# Patient Record
Sex: MALE | Race: BLACK OR AFRICAN AMERICAN | NOT HISPANIC OR LATINO | Employment: FULL TIME | URBAN - METROPOLITAN AREA
[De-identification: names, ages, dates, MRNs, and addresses within clinical notes are randomized per-mention and may not be internally consistent; named-entity substitution may affect disease eponyms.]

---

## 2019-01-08 ENCOUNTER — HOSPITAL ENCOUNTER (EMERGENCY)
Facility: HOSPITAL | Age: 64
Discharge: HOME | End: 2019-01-08
Attending: EMERGENCY MEDICINE
Payer: COMMERCIAL

## 2019-01-08 VITALS
HEART RATE: 72 BPM | WEIGHT: 198.75 LBS | OXYGEN SATURATION: 99 % | SYSTOLIC BLOOD PRESSURE: 164 MMHG | DIASTOLIC BLOOD PRESSURE: 87 MMHG | HEIGHT: 70 IN | BODY MASS INDEX: 28.45 KG/M2 | TEMPERATURE: 98 F | RESPIRATION RATE: 15 BRPM

## 2019-01-08 DIAGNOSIS — R73.9 HYPERGLYCEMIA: ICD-10-CM

## 2019-01-08 DIAGNOSIS — R07.9 CHEST PAIN: ICD-10-CM

## 2019-01-08 DIAGNOSIS — M25.511 ACUTE PAIN OF RIGHT SHOULDER: Primary | ICD-10-CM

## 2019-01-08 LAB
ALBUMIN SERPL BCP-MCNC: 4 G/DL
ALP SERPL-CCNC: 113 U/L
ALT SERPL W/O P-5'-P-CCNC: 27 U/L
ANION GAP SERPL CALC-SCNC: 12 MMOL/L
AST SERPL-CCNC: 21 U/L
BASOPHILS # BLD AUTO: 0.01 K/UL
BASOPHILS NFR BLD: 0.1 %
BILIRUB SERPL-MCNC: 0.3 MG/DL
BUN SERPL-MCNC: 13 MG/DL
CALCIUM SERPL-MCNC: 9.5 MG/DL
CHLORIDE SERPL-SCNC: 105 MMOL/L
CO2 SERPL-SCNC: 20 MMOL/L
CREAT SERPL-MCNC: 1.1 MG/DL
DIFFERENTIAL METHOD: ABNORMAL
EOSINOPHIL # BLD AUTO: 0.1 K/UL
EOSINOPHIL NFR BLD: 1.3 %
ERYTHROCYTE [DISTWIDTH] IN BLOOD BY AUTOMATED COUNT: 14.2 %
EST. GFR  (AFRICAN AMERICAN): >60 ML/MIN/1.73 M^2
EST. GFR  (NON AFRICAN AMERICAN): >60 ML/MIN/1.73 M^2
GLUCOSE SERPL-MCNC: 283 MG/DL
HCT VFR BLD AUTO: 45.2 %
HGB BLD-MCNC: 14.4 G/DL
LYMPHOCYTES # BLD AUTO: 3.2 K/UL
LYMPHOCYTES NFR BLD: 41.4 %
MCH RBC QN AUTO: 26.4 PG
MCHC RBC AUTO-ENTMCNC: 31.9 G/DL
MCV RBC AUTO: 83 FL
MONOCYTES # BLD AUTO: 0.9 K/UL
MONOCYTES NFR BLD: 10.9 %
NEUTROPHILS # BLD AUTO: 3.6 K/UL
NEUTROPHILS NFR BLD: 46.3 %
PLATELET # BLD AUTO: 268 K/UL
PMV BLD AUTO: 10.5 FL
POTASSIUM SERPL-SCNC: 3.8 MMOL/L
PROT SERPL-MCNC: 7.9 G/DL
RBC # BLD AUTO: 5.46 M/UL
SODIUM SERPL-SCNC: 137 MMOL/L
TROPONIN I SERPL DL<=0.01 NG/ML-MCNC: <0.006 NG/ML
WBC # BLD AUTO: 7.77 K/UL

## 2019-01-08 PROCEDURE — 85025 COMPLETE CBC W/AUTO DIFF WBC: CPT

## 2019-01-08 PROCEDURE — 99285 EMERGENCY DEPT VISIT HI MDM: CPT | Mod: 25

## 2019-01-08 PROCEDURE — 25000003 PHARM REV CODE 250: Performed by: EMERGENCY MEDICINE

## 2019-01-08 PROCEDURE — 84484 ASSAY OF TROPONIN QUANT: CPT

## 2019-01-08 PROCEDURE — 93010 ELECTROCARDIOGRAM REPORT: CPT | Mod: ,,, | Performed by: INTERNAL MEDICINE

## 2019-01-08 PROCEDURE — 93010 EKG 12-LEAD: ICD-10-PCS | Mod: ,,, | Performed by: INTERNAL MEDICINE

## 2019-01-08 PROCEDURE — 93005 ELECTROCARDIOGRAM TRACING: CPT

## 2019-01-08 PROCEDURE — 63600175 PHARM REV CODE 636 W HCPCS: Performed by: EMERGENCY MEDICINE

## 2019-01-08 PROCEDURE — 80053 COMPREHEN METABOLIC PANEL: CPT

## 2019-01-08 RX ORDER — NITROGLYCERIN 0.4 MG/1
0.4 TABLET SUBLINGUAL
Status: COMPLETED | OUTPATIENT
Start: 2019-01-08 | End: 2019-01-08

## 2019-01-08 RX ORDER — MELOXICAM 7.5 MG/1
7.5 TABLET ORAL DAILY
Qty: 10 TABLET | Refills: 0 | Status: SHIPPED | OUTPATIENT
Start: 2019-01-08 | End: 2019-01-18

## 2019-01-08 RX ORDER — ASPIRIN 325 MG
325 TABLET ORAL
Status: COMPLETED | OUTPATIENT
Start: 2019-01-08 | End: 2019-01-08

## 2019-01-08 RX ADMIN — NITROGLYCERIN 0.4 MG: 0.4 TABLET, ORALLY DISINTEGRATING SUBLINGUAL at 05:01

## 2019-01-08 RX ADMIN — ASPIRIN 325 MG ORAL TABLET 325 MG: 325 PILL ORAL at 05:01

## 2019-01-08 NOTE — ED PROVIDER NOTES
SCRIBE #1 NOTE: I, Karin Corbett, am scribing for, and in the presence of, Aime Martinez MD. I have scribed the entire note.      History      Chief Complaint   Patient presents with    Chest Pain     started during the night in right upper arm, constant pain in right side of chest       Review of patient's allergies indicates:  No Known Allergies     HPI   HPI    1/8/2019, 4:58 AM   History obtained from the patient      History of Present Illness: Tramaine Resendiz is a 63 y.o. male patient who presents to the Emergency Department for R sided CP which onset gradually yesterday. Pain radiates to his R shoulder. Pt reports he was doing heavy lifting at work yesterday but became concerned when the pain was still present this AM. Symptoms are constant and moderate in severity. No mitigating or exacerbating factors reported. No associated sxs. Patient denies any fever, chills, diaphoresis, SOB, cough, BLE edema/pain, n/v, extremity weakness/numbness, dizziness, and all other sxs at this time. No further complaints or concerns at this time.       Arrival mode: Personal vehicle     PCP: Primary Doctor No       Past Medical History:  Past medical history reviewed not relevant    Past Surgical History:  Past surgical history reviewed not relevant    Family History:  Family history reviewed not relevant    Social History:    Social History Main Topics    Smoking status: Unknown if ever smoked    Smokeless tobacco: Unknown if ever used    Alcohol Use: Unknown drinking history    Drug Use: Unknown if ever used    Sexual Activity: Unknown       ROS   Review of Systems   Constitutional: Negative for chills, diaphoresis and fever.   HENT: Negative for congestion and sore throat.    Eyes: Negative for visual disturbance.   Respiratory: Negative for cough and shortness of breath.    Cardiovascular: Positive for chest pain (R side). Negative for palpitations and leg swelling.   Gastrointestinal: Negative for nausea and  "vomiting.   Genitourinary: Negative for dysuria.   Musculoskeletal: Positive for arthralgias (R shoulder). Negative for back pain and myalgias.   Skin: Negative for rash.   Neurological: Negative for dizziness, weakness and numbness.   Hematological: Does not bruise/bleed easily.   All other systems reviewed and are negative.    Physical Exam      Initial Vitals [01/08/19 0448]   BP Pulse Resp Temp SpO2   (!) 178/98 82 20 97.9 °F (36.6 °C) 98 %      MAP       --          Physical Exam  Nursing Notes and Vital Signs Reviewed.  Constitutional: Patient is in no acute distress. Well-developed and well-nourished.  Head: Atraumatic. Normocephalic.  Eyes: PERRL. EOM intact. Conjunctivae are not pale. No scleral icterus.  ENT: Mucous membranes are moist. Oropharynx is clear and symmetric.    Neck: Supple. Full ROM. No lymphadenopathy.  Cardiovascular: Regular rate. Regular rhythm. No murmurs, rubs, or gallops. Distal pulses are 2+ and symmetric.  Pulmonary/Chest: No respiratory distress. Clear to auscultation bilaterally. No wheezing or rales.   Abdominal: Soft and non-distended.  There is no tenderness.  No rebound, guarding, or rigidity.   Musculoskeletal: Moves all extremities. No obvious deformities. No edema. No calf tenderness. R anterior shoulder and upper right chest tenderness.   Skin: Warm and dry.  Neurological:  Alert, awake, and appropriate.  Normal speech.  No acute focal neurological deficits are appreciated.  Psychiatric: Normal affect. Good eye contact. Appropriate in content.    ED Course    Procedures  ED Vital Signs:  Vitals:    01/08/19 0448 01/08/19 0511 01/08/19 0521 01/08/19 0531   BP: (!) 178/98  (!) 177/97 (!) 167/92   Pulse: 82 75 73 74   Resp: 20  11 19   Temp: 97.9 °F (36.6 °C)      TempSrc: Oral      SpO2: 98%  100% 97%   Weight: 90.2 kg (198 lb 11.9 oz)      Height: 5' 10" (1.778 m)       01/08/19 0541 01/08/19 0601   BP: (!) 157/88 (!) 164/87   Pulse: 72 72   Resp: 10 15   Temp:     TempSrc:  "    SpO2: 100% 99%   Weight:     Height:         Abnormal Lab Results:  Labs Reviewed   CBC W/ AUTO DIFFERENTIAL - Abnormal; Notable for the following components:       Result Value    MCH 26.4 (*)     MCHC 31.9 (*)     All other components within normal limits   COMPREHENSIVE METABOLIC PANEL - Abnormal; Notable for the following components:    CO2 20 (*)     Glucose 283 (*)     All other components within normal limits   TROPONIN I        All Lab Results:  Results for orders placed or performed during the hospital encounter of 01/08/19   CBC auto differential   Result Value Ref Range    WBC 7.77 3.90 - 12.70 K/uL    RBC 5.46 4.60 - 6.20 M/uL    Hemoglobin 14.4 14.0 - 18.0 g/dL    Hematocrit 45.2 40.0 - 54.0 %    MCV 83 82 - 98 fL    MCH 26.4 (L) 27.0 - 31.0 pg    MCHC 31.9 (L) 32.0 - 36.0 g/dL    RDW 14.2 11.5 - 14.5 %    Platelets 268 150 - 350 K/uL    MPV 10.5 9.2 - 12.9 fL    Gran # (ANC) 3.6 1.8 - 7.7 K/uL    Lymph # 3.2 1.0 - 4.8 K/uL    Mono # 0.9 0.3 - 1.0 K/uL    Eos # 0.1 0.0 - 0.5 K/uL    Baso # 0.01 0.00 - 0.20 K/uL    Gran% 46.3 38.0 - 73.0 %    Lymph% 41.4 18.0 - 48.0 %    Mono% 10.9 4.0 - 15.0 %    Eosinophil% 1.3 0.0 - 8.0 %    Basophil% 0.1 0.0 - 1.9 %    Differential Method Automated    Comprehensive metabolic panel   Result Value Ref Range    Sodium 137 136 - 145 mmol/L    Potassium 3.8 3.5 - 5.1 mmol/L    Chloride 105 95 - 110 mmol/L    CO2 20 (L) 23 - 29 mmol/L    Glucose 283 (H) 70 - 110 mg/dL    BUN, Bld 13 8 - 23 mg/dL    Creatinine 1.1 0.5 - 1.4 mg/dL    Calcium 9.5 8.7 - 10.5 mg/dL    Total Protein 7.9 6.0 - 8.4 g/dL    Albumin 4.0 3.5 - 5.2 g/dL    Total Bilirubin 0.3 0.1 - 1.0 mg/dL    Alkaline Phosphatase 113 55 - 135 U/L    AST 21 10 - 40 U/L    ALT 27 10 - 44 U/L    Anion Gap 12 8 - 16 mmol/L    eGFR if African American >60 >60 mL/min/1.73 m^2    eGFR if non African American >60 >60 mL/min/1.73 m^2   Troponin I   Result Value Ref Range    Troponin I <0.006 0.000 - 0.026 ng/mL        Imaging Results:  Imaging Results          X-Ray Chest AP Portable (Final result)  Result time 01/08/19 07:38:31    Final result by Luis Fernando Simental MD (01/08/19 07:38:31)                 Impression:      No acute process seen.      Electronically signed by: Luis Fernando Simental MD  Date:    01/08/2019  Time:    07:38             Narrative:    EXAMINATION:  XR CHEST AP PORTABLE    CLINICAL HISTORY:  Chest pain, unspecified    FINDINGS:  Single view of the chest.  Aorta demonstrates atherosclerotic disease.    Cardiac silhouette is normal.  The lungs demonstrate no evidence of active disease.  No evidence of pleural effusion or pneumothorax.  Bones demonstrate degenerative changes..                               5:43 AM: Per ED physician, pt's CXR results show: naf    The EKG was ordered, reviewed, and independently interpreted by the ED provider.  Interpretation time: 0507  Rate: 82 BPM  Rhythm: Sinus rhythm with PACs  Interpretation: No acute ST changes. No STEMI.         The Emergency Provider reviewed the vital signs and test results, which are outlined above.    ED Discussion     5:58 AM: Reassessed pt at this time. Advised pt to f/u with his PCP regarding his blood sugar and elevated BP. Pt states his condition has improved at this time. Discussed with pt all pertinent ED information and results. Discussed pt dx and plan of tx. Gave pt all f/u and return to the ED instructions. All questions and concerns were addressed at this time. Pt expresses understanding of information and instructions, and is comfortable with plan to discharge. Pt is stable for discharge.    I have discussed with patient and/or family/caretaker chest pain precautions, specifically to return for worsening chest pain, shortness of breath, fever, or any concern.  I have low suspicion for cardiopulmonary, vascular, infectious, respiratory, or other emergent medical condition based on my evaluation in the ED.    Pre-hypertension/Hypertension:  The pt has been informed that they may have pre-hypertension or hypertension based on a blood pressure reading in the ED. I recommend that the pt call the PCP listed on their discharge instructions or a physician of their choice this week to arrange f/u for further evaluation of possible pre-hypertension or hypertension.     ED Medication(s):  Medications   aspirin tablet 325 mg (325 mg Oral Given 1/8/19 0517)   nitroGLYCERIN SL tablet 0.4 mg (0.4 mg Sublingual Given 1/8/19 0518)     Current Discharge Medication List      START taking these medications    Details   meloxicam (MOBIC) 7.5 MG tablet Take 1 tablet (7.5 mg total) by mouth once daily. for 10 days  Qty: 10 tablet, Refills: 0           Follow-up Information     Schedule an appointment as soon as possible for a visit  with [facility].    Contact information:  [address] [place] [address] [phone]                   Medical Decision Making    Medical Decision Making:   Clinical Tests:   Lab Tests: Ordered and Reviewed  Radiological Study: Ordered and Reviewed  Medical Tests: Ordered and Reviewed           Scribe Attestation:   Scribe #1: I performed the above scribed service and the documentation accurately describes the services I performed. I attest to the accuracy of the note.    Attending:   Physician Attestation Statement for Scribe #1: I, Aime Martinez MD, personally performed the services described in this documentation, as scribed by Karin Corbett, in my presence, and it is both accurate and complete.          Clinical Impression       ICD-10-CM ICD-9-CM   1. Acute pain of right shoulder M25.511 719.41   2. Chest pain R07.9 786.50   3. Hyperglycemia R73.9 790.29       Disposition:   Disposition: Discharged  Condition: Stable         Aime Martinez MD  01/09/19 0959

## 2021-03-19 ENCOUNTER — IMMUNIZATION (OUTPATIENT)
Dept: INTERNAL MEDICINE | Facility: CLINIC | Age: 66
End: 2021-03-19
Payer: OTHER GOVERNMENT

## 2021-03-19 DIAGNOSIS — Z23 NEED FOR VACCINATION: Primary | ICD-10-CM

## 2021-03-19 PROCEDURE — 91300 COVID-19, MRNA, LNP-S, PF, 30 MCG/0.3 ML DOSE VACCINE: CPT | Mod: PBBFAC | Performed by: FAMILY MEDICINE

## 2021-04-09 ENCOUNTER — IMMUNIZATION (OUTPATIENT)
Dept: INTERNAL MEDICINE | Facility: CLINIC | Age: 66
End: 2021-04-09
Payer: COMMERCIAL

## 2021-04-09 DIAGNOSIS — Z23 NEED FOR VACCINATION: Primary | ICD-10-CM

## 2021-04-09 PROCEDURE — 91300 COVID-19, MRNA, LNP-S, PF, 30 MCG/0.3 ML DOSE VACCINE: ICD-10-PCS | Mod: S$GLB,,, | Performed by: FAMILY MEDICINE

## 2021-04-09 PROCEDURE — 91300 COVID-19, MRNA, LNP-S, PF, 30 MCG/0.3 ML DOSE VACCINE: CPT | Mod: S$GLB,,, | Performed by: FAMILY MEDICINE

## 2021-04-09 PROCEDURE — 0002A COVID-19, MRNA, LNP-S, PF, 30 MCG/0.3 ML DOSE VACCINE: ICD-10-PCS | Mod: CV19,S$GLB,, | Performed by: FAMILY MEDICINE

## 2021-04-09 PROCEDURE — 0002A COVID-19, MRNA, LNP-S, PF, 30 MCG/0.3 ML DOSE VACCINE: CPT | Mod: CV19,S$GLB,, | Performed by: FAMILY MEDICINE

## 2021-09-29 ENCOUNTER — IMMUNIZATION (OUTPATIENT)
Dept: PRIMARY CARE CLINIC | Facility: CLINIC | Age: 66
End: 2021-09-29
Payer: COMMERCIAL

## 2021-09-29 DIAGNOSIS — Z23 NEED FOR VACCINATION: Primary | ICD-10-CM

## 2021-09-29 PROCEDURE — 91300 COVID-19, MRNA, LNP-S, PF, 30 MCG/0.3 ML DOSE VACCINE: CPT | Mod: PBBFAC | Performed by: FAMILY MEDICINE

## 2021-09-29 PROCEDURE — 0003A COVID-19, MRNA, LNP-S, PF, 30 MCG/0.3 ML DOSE VACCINE: CPT | Mod: CV19,PBBFAC | Performed by: FAMILY MEDICINE

## 2022-09-26 ENCOUNTER — HOSPITAL ENCOUNTER (OUTPATIENT)
Dept: RADIOLOGY | Facility: HOSPITAL | Age: 67
Discharge: HOME | End: 2022-09-26
Attending: FAMILY MEDICINE
Payer: MEDICARE

## 2022-09-26 ENCOUNTER — OFFICE VISIT (OUTPATIENT)
Dept: INTERNAL MEDICINE | Facility: CLINIC | Age: 67
End: 2022-09-26
Payer: COMMERCIAL

## 2022-09-26 VITALS
BODY MASS INDEX: 26.14 KG/M2 | HEART RATE: 95 BPM | HEIGHT: 70 IN | DIASTOLIC BLOOD PRESSURE: 87 MMHG | OXYGEN SATURATION: 99 % | WEIGHT: 182.56 LBS | TEMPERATURE: 98 F | SYSTOLIC BLOOD PRESSURE: 136 MMHG

## 2022-09-26 DIAGNOSIS — M25.559 HIP PAIN: ICD-10-CM

## 2022-09-26 DIAGNOSIS — Z00.00 ENCOUNTER FOR MEDICAL EXAMINATION TO ESTABLISH CARE: Primary | ICD-10-CM

## 2022-09-26 DIAGNOSIS — M25.562 ACUTE PAIN OF LEFT KNEE: ICD-10-CM

## 2022-09-26 DIAGNOSIS — Z00.00 ANNUAL PHYSICAL EXAM: ICD-10-CM

## 2022-09-26 DIAGNOSIS — Z12.5 SCREENING FOR MALIGNANT NEOPLASM OF PROSTATE: ICD-10-CM

## 2022-09-26 DIAGNOSIS — Z13.6 ENCOUNTER FOR LIPID SCREENING FOR CARDIOVASCULAR DISEASE: ICD-10-CM

## 2022-09-26 DIAGNOSIS — Z12.11 SCREEN FOR COLON CANCER: ICD-10-CM

## 2022-09-26 DIAGNOSIS — Z23 NEED FOR PNEUMOCOCCAL VACCINATION: ICD-10-CM

## 2022-09-26 DIAGNOSIS — Z13.220 ENCOUNTER FOR LIPID SCREENING FOR CARDIOVASCULAR DISEASE: ICD-10-CM

## 2022-09-26 DIAGNOSIS — E11.69 TYPE 2 DIABETES MELLITUS WITH OTHER SPECIFIED COMPLICATION, UNSPECIFIED WHETHER LONG TERM INSULIN USE: ICD-10-CM

## 2022-09-26 PROBLEM — M75.111 INCOMPLETE TEAR OF RIGHT ROTATOR CUFF: Status: ACTIVE | Noted: 2017-07-06

## 2022-09-26 PROBLEM — M75.41 IMPINGEMENT SYNDROME OF RIGHT SHOULDER: Status: ACTIVE | Noted: 2017-07-06

## 2022-09-26 PROBLEM — Z91.199 NONCOMPLIANCE: Status: ACTIVE | Noted: 2021-05-12

## 2022-09-26 PROBLEM — E11.65 TYPE 2 DIABETES MELLITUS WITH HYPERGLYCEMIA, WITHOUT LONG-TERM CURRENT USE OF INSULIN: Status: ACTIVE | Noted: 2020-08-19

## 2022-09-26 PROBLEM — N52.9 ERECTILE DYSFUNCTION: Status: ACTIVE | Noted: 2020-09-23

## 2022-09-26 PROCEDURE — G0008 PNEUMOCOCCAL CONJUGATE VACCINE 20-VALENT: ICD-10-PCS | Mod: HCNC,59,S$GLB, | Performed by: FAMILY MEDICINE

## 2022-09-26 PROCEDURE — 90677 PNEUMOCOCCAL CONJUGATE VACCINE 20-VALENT: ICD-10-PCS | Mod: HCNC,S$GLB,, | Performed by: FAMILY MEDICINE

## 2022-09-26 PROCEDURE — 99204 OFFICE O/P NEW MOD 45 MIN: CPT | Mod: 25,HCNC,S$GLB, | Performed by: FAMILY MEDICINE

## 2022-09-26 PROCEDURE — G0008 ADMIN INFLUENZA VIRUS VAC: HCPCS | Mod: HCNC,59,S$GLB, | Performed by: FAMILY MEDICINE

## 2022-09-26 PROCEDURE — 73521 X-RAY EXAM HIPS BI 2 VIEWS: CPT | Mod: TC,HCNC

## 2022-09-26 PROCEDURE — 1101F PR PT FALLS ASSESS DOC 0-1 FALLS W/OUT INJ PAST YR: ICD-10-PCS | Mod: HCNC,CPTII,S$GLB, | Performed by: FAMILY MEDICINE

## 2022-09-26 PROCEDURE — 1101F PT FALLS ASSESS-DOCD LE1/YR: CPT | Mod: HCNC,CPTII,S$GLB, | Performed by: FAMILY MEDICINE

## 2022-09-26 PROCEDURE — 73562 X-RAY EXAM OF KNEE 3: CPT | Mod: 26,HCNC,LT, | Performed by: RADIOLOGY

## 2022-09-26 PROCEDURE — 73560 X-RAY EXAM OF KNEE 1 OR 2: CPT | Mod: 59,LT

## 2022-09-26 PROCEDURE — 1160F PR REVIEW ALL MEDS BY PRESCRIBER/CLIN PHARMACIST DOCUMENTED: ICD-10-PCS | Mod: HCNC,CPTII,S$GLB, | Performed by: FAMILY MEDICINE

## 2022-09-26 PROCEDURE — 73521 X-RAY EXAM HIPS BI 2 VIEWS: CPT | Mod: 26,HCNC,, | Performed by: RADIOLOGY

## 2022-09-26 PROCEDURE — 1159F MED LIST DOCD IN RCRD: CPT | Mod: HCNC,CPTII,S$GLB, | Performed by: FAMILY MEDICINE

## 2022-09-26 PROCEDURE — 90694 FLU VACCINE - QUADRIVALENT - ADJUVANTED: ICD-10-PCS | Mod: HCNC,S$GLB,, | Performed by: FAMILY MEDICINE

## 2022-09-26 PROCEDURE — 73562 XR KNEE ORTHO LEFT: ICD-10-PCS | Mod: 26,HCNC,LT, | Performed by: RADIOLOGY

## 2022-09-26 PROCEDURE — 3288F FALL RISK ASSESSMENT DOCD: CPT | Mod: HCNC,CPTII,S$GLB, | Performed by: FAMILY MEDICINE

## 2022-09-26 PROCEDURE — 3008F BODY MASS INDEX DOCD: CPT | Mod: HCNC,CPTII,S$GLB, | Performed by: FAMILY MEDICINE

## 2022-09-26 PROCEDURE — 73521 XR HIPS BILATERAL 2 VIEW INCL AP PELVIS: ICD-10-PCS | Mod: 26,HCNC,, | Performed by: RADIOLOGY

## 2022-09-26 PROCEDURE — 1159F PR MEDICATION LIST DOCUMENTED IN MEDICAL RECORD: ICD-10-PCS | Mod: HCNC,CPTII,S$GLB, | Performed by: FAMILY MEDICINE

## 2022-09-26 PROCEDURE — 73560 X-RAY EXAM OF KNEE 1 OR 2: CPT | Mod: 26,HCNC,RT, | Performed by: RADIOLOGY

## 2022-09-26 PROCEDURE — 3008F PR BODY MASS INDEX (BMI) DOCUMENTED: ICD-10-PCS | Mod: HCNC,CPTII,S$GLB, | Performed by: FAMILY MEDICINE

## 2022-09-26 PROCEDURE — 90694 VACC AIIV4 NO PRSRV 0.5ML IM: CPT | Mod: HCNC,S$GLB,, | Performed by: FAMILY MEDICINE

## 2022-09-26 PROCEDURE — 3288F PR FALLS RISK ASSESSMENT DOCUMENTED: ICD-10-PCS | Mod: HCNC,CPTII,S$GLB, | Performed by: FAMILY MEDICINE

## 2022-09-26 PROCEDURE — 1125F AMNT PAIN NOTED PAIN PRSNT: CPT | Mod: HCNC,CPTII,S$GLB, | Performed by: FAMILY MEDICINE

## 2022-09-26 PROCEDURE — 99999 PR PBB SHADOW E&M-EST. PATIENT-LVL V: ICD-10-PCS | Mod: PBBFAC,HCNC,, | Performed by: FAMILY MEDICINE

## 2022-09-26 PROCEDURE — 90677 PCV20 VACCINE IM: CPT | Mod: HCNC,S$GLB,, | Performed by: FAMILY MEDICINE

## 2022-09-26 PROCEDURE — G0009 ADMIN PNEUMOCOCCAL VACCINE: HCPCS | Mod: HCNC,S$GLB,, | Performed by: FAMILY MEDICINE

## 2022-09-26 PROCEDURE — 99999 PR PBB SHADOW E&M-EST. PATIENT-LVL V: CPT | Mod: PBBFAC,HCNC,, | Performed by: FAMILY MEDICINE

## 2022-09-26 PROCEDURE — 1160F RVW MEDS BY RX/DR IN RCRD: CPT | Mod: HCNC,CPTII,S$GLB, | Performed by: FAMILY MEDICINE

## 2022-09-26 PROCEDURE — 1125F PR PAIN SEVERITY QUANTIFIED, PAIN PRESENT: ICD-10-PCS | Mod: HCNC,CPTII,S$GLB, | Performed by: FAMILY MEDICINE

## 2022-09-26 PROCEDURE — 99204 PR OFFICE/OUTPT VISIT, NEW, LEVL IV, 45-59 MIN: ICD-10-PCS | Mod: 25,HCNC,S$GLB, | Performed by: FAMILY MEDICINE

## 2022-09-26 PROCEDURE — G0009 FLU VACCINE - QUADRIVALENT - ADJUVANTED: ICD-10-PCS | Mod: HCNC,S$GLB,, | Performed by: FAMILY MEDICINE

## 2022-09-26 PROCEDURE — 73560 XR KNEE ORTHO LEFT: ICD-10-PCS | Mod: 26,HCNC,RT, | Performed by: RADIOLOGY

## 2022-09-26 NOTE — PROGRESS NOTES
Tramaine Resendiz  09/26/2022  [id number]    Primary Doctor No  Patient Care Team:  Primary Doctor No as PCP - General          Visit Type: New patient    Chief Complaint:  Chief Complaint   Patient presents with    Annual Exam         History of Present Illness:  New patient  Seen preivous in community care clinic at [facility], uncotnrolled  LAst A1c last year  >14 on Oral meds only  Non compliance noted in chart.    NO HM updated on chart    He reports that he has lost weight  He does run and walk for exercise.    He does admit to knee and hip pain.  He said he yohan down and can't get up  He said his left knee gives out at times.  He did take tylenol    He does check his Blood Sugars.  He said it stays under 200  He is not on meds, he used to take Metformin and glipizide.      History:  Past Medical History:   Diagnosis Date    Type 2 diabetes mellitus without complications      Past Surgical History:   Procedure Laterality Date    HERNIA REPAIR       History reviewed. No pertinent family history.  Social History     Socioeconomic History    Marital status:    Tobacco Use    Smoking status: Former     Types: Cigarettes    Smokeless tobacco: Never   Substance and Sexual Activity    Alcohol use: Yes     Patient Active Problem List   Diagnosis    Impingement syndrome of right shoulder    Incomplete tear of right rotator cuff    Erectile dysfunction    Noncompliance    Type 2 diabetes mellitus with hyperglycemia, without long-term current use of insulin     Review of patient's allergies indicates:  No Known Allergies    The following were reviewed at this visit: active problem list, medication list, allergies, family history, social history, and health maintenance.    Medications:  No current outpatient medications on file prior to visit.     No current facility-administered medications on file prior to visit.       Medications have been reviewed and reconciled with patient at this visit.  Barriers to medications  reviewed with patient.    Adverse reactions to current medications reviewed with patient..    Over the counter medications reviewed and reconciled with patient.    Exam:  Wt Readings from Last 3 Encounters:   09/26/22 82.8 kg (182 lb 8.7 oz)   01/08/19 90.2 kg (198 lb 11.9 oz)     Temp Readings from Last 3 Encounters:   09/26/22 97.7 °F (36.5 °C) (Temporal)   01/08/19 97.9 °F (36.6 °C) (Oral)     BP Readings from Last 3 Encounters:   09/26/22 136/87   01/08/19 (!) 164/87     Pulse Readings from Last 3 Encounters:   09/26/22 95   01/08/19 72     Body mass index is 26.19 kg/m².      Review of Systems   Constitutional: Negative.  Negative for chills and fever.   HENT: Negative.  Negative for congestion, sinus pain and sore throat.    Eyes:  Negative for blurred vision and double vision.   Respiratory:  Negative for cough, sputum production, shortness of breath and wheezing.    Cardiovascular:  Negative for chest pain, palpitations and leg swelling.   Gastrointestinal:  Negative for abdominal pain, constipation, diarrhea, heartburn, nausea and vomiting.   Genitourinary: Negative.    Musculoskeletal:  Positive for joint pain. Negative for back pain.   Skin: Negative.  Negative for rash.   Neurological: Negative.    Endo/Heme/Allergies: Negative.  Negative for polydipsia. Does not bruise/bleed easily.   Psychiatric/Behavioral:  Negative for depression and substance abuse.    Physical Exam  Nursing note reviewed.   Cardiovascular:      Rate and Rhythm: Normal rate and regular rhythm.   Pulmonary:      Effort: Pulmonary effort is normal. No respiratory distress.   Musculoskeletal:         General: Tenderness present. No swelling or signs of injury.   Neurological:      Mental Status: He is alert and oriented to person, place, and time.   Psychiatric:         Mood and Affect: Mood normal.         Behavior: Behavior normal.         Thought Content: Thought content normal.         Judgment: Judgment normal.       Laboratory  Reviewed ({Yes)  Lab Results   Component Value Date    WBC 7.77 01/08/2019    HGB 14.4 01/08/2019    HCT 45.2 01/08/2019     01/08/2019    ALT 27 01/08/2019    AST 21 01/08/2019     01/08/2019    K 3.8 01/08/2019     01/08/2019    CREATININE 1.1 01/08/2019    BUN 13 01/08/2019    CO2 20 (L) 01/08/2019       Tramaine was seen today for annual exam.    Diagnoses and all orders for this visit:    Encounter for medical examination to establish care  -     CBC Without Differential; Future  -     Comprehensive Metabolic Panel; Future  -     Lipid Panel; Future  -     PSA, Screening; Future  -     HIV 1/2 Ag/Ab (4th Gen); Future  -     Hepatitis C Antibody; Future    Annual physical exam  -     CBC Without Differential; Future  -     Comprehensive Metabolic Panel; Future  -     Lipid Panel; Future  -     PSA, Screening; Future  -     HIV 1/2 Ag/Ab (4th Gen); Future  -     Hepatitis C Antibody; Future    Screening for malignant neoplasm of prostate  -     PSA, Screening; Future    Encounter for lipid screening for cardiovascular disease  -     Lipid Panel; Future    Need for pneumococcal vaccination  -     (In Office Administered) Pneumococcal Conjugate Vaccine (20 Valent) (IM)    Type 2 diabetes mellitus with other specified complication, unspecified whether long term insulin use  -     Comprehensive Metabolic Panel; Future  -     Lipid Panel; Future  -     Hemoglobin A1C; Future  -     Microalbumin/Creatinine Ratio, Urine; Future  -     Ambulatory referral/consult to Optometry; Future    Acute pain of left knee  -     X-Ray Knee 3 View Left; Future  -     Ambulatory referral/consult to Orthopedics; Future    Hip pain  -     X-Ray Hip 3 or 4 views Bilateral; Future  -     Ambulatory referral/consult to Orthopedics; Future    Screen for colon cancer  -     Fecal Immunochemical Test (iFOBT); Future              Care Plan/Goals: Reviewed    Goals    None         Follow up: No follow-ups on file.    After  visit summary was printed and given to patient upon discharge today.  Patient goals and care plan are included in After Visit Summary.

## 2022-10-01 DIAGNOSIS — R97.20 ABNORMAL PSA: Primary | ICD-10-CM

## 2022-10-06 ENCOUNTER — OFFICE VISIT (OUTPATIENT)
Dept: ORTHOPEDICS | Facility: CLINIC | Age: 67
End: 2022-10-06
Payer: MEDICARE

## 2022-10-06 VITALS — HEIGHT: 70 IN | BODY MASS INDEX: 26.05 KG/M2 | WEIGHT: 182 LBS

## 2022-10-06 DIAGNOSIS — M25.559 HIP PAIN: ICD-10-CM

## 2022-10-06 DIAGNOSIS — M25.562 ACUTE PAIN OF LEFT KNEE: ICD-10-CM

## 2022-10-06 DIAGNOSIS — M25.559 GREATER TROCHANTERIC PAIN SYNDROME: Primary | ICD-10-CM

## 2022-10-06 PROCEDURE — 97110 THERAPEUTIC EXERCISES: CPT | Mod: GP,LT,S$GLB, | Performed by: STUDENT IN AN ORGANIZED HEALTH CARE EDUCATION/TRAINING PROGRAM

## 2022-10-06 PROCEDURE — 3046F HEMOGLOBIN A1C LEVEL >9.0%: CPT | Mod: CPTII,S$GLB,, | Performed by: STUDENT IN AN ORGANIZED HEALTH CARE EDUCATION/TRAINING PROGRAM

## 2022-10-06 PROCEDURE — 99999 PR PBB SHADOW E&M-EST. PATIENT-LVL III: ICD-10-PCS | Mod: PBBFAC,,, | Performed by: STUDENT IN AN ORGANIZED HEALTH CARE EDUCATION/TRAINING PROGRAM

## 2022-10-06 PROCEDURE — 3046F PR MOST RECENT HEMOGLOBIN A1C LEVEL > 9.0%: ICD-10-PCS | Mod: CPTII,S$GLB,, | Performed by: STUDENT IN AN ORGANIZED HEALTH CARE EDUCATION/TRAINING PROGRAM

## 2022-10-06 PROCEDURE — 97110 PR THERAPEUTIC EXERCISES: ICD-10-PCS | Mod: GP,LT,S$GLB, | Performed by: STUDENT IN AN ORGANIZED HEALTH CARE EDUCATION/TRAINING PROGRAM

## 2022-10-06 PROCEDURE — 99999 PR PBB SHADOW E&M-EST. PATIENT-LVL III: CPT | Mod: PBBFAC,,, | Performed by: STUDENT IN AN ORGANIZED HEALTH CARE EDUCATION/TRAINING PROGRAM

## 2022-10-06 PROCEDURE — 1159F MED LIST DOCD IN RCRD: CPT | Mod: CPTII,S$GLB,, | Performed by: STUDENT IN AN ORGANIZED HEALTH CARE EDUCATION/TRAINING PROGRAM

## 2022-10-06 PROCEDURE — 1159F PR MEDICATION LIST DOCUMENTED IN MEDICAL RECORD: ICD-10-PCS | Mod: CPTII,S$GLB,, | Performed by: STUDENT IN AN ORGANIZED HEALTH CARE EDUCATION/TRAINING PROGRAM

## 2022-10-06 PROCEDURE — 1101F PR PT FALLS ASSESS DOC 0-1 FALLS W/OUT INJ PAST YR: ICD-10-PCS | Mod: CPTII,S$GLB,, | Performed by: STUDENT IN AN ORGANIZED HEALTH CARE EDUCATION/TRAINING PROGRAM

## 2022-10-06 PROCEDURE — 3008F PR BODY MASS INDEX (BMI) DOCUMENTED: ICD-10-PCS | Mod: CPTII,S$GLB,, | Performed by: STUDENT IN AN ORGANIZED HEALTH CARE EDUCATION/TRAINING PROGRAM

## 2022-10-06 PROCEDURE — 1160F RVW MEDS BY RX/DR IN RCRD: CPT | Mod: CPTII,S$GLB,, | Performed by: STUDENT IN AN ORGANIZED HEALTH CARE EDUCATION/TRAINING PROGRAM

## 2022-10-06 PROCEDURE — 99204 OFFICE O/P NEW MOD 45 MIN: CPT | Mod: 25,S$GLB,, | Performed by: STUDENT IN AN ORGANIZED HEALTH CARE EDUCATION/TRAINING PROGRAM

## 2022-10-06 PROCEDURE — 1160F PR REVIEW ALL MEDS BY PRESCRIBER/CLIN PHARMACIST DOCUMENTED: ICD-10-PCS | Mod: CPTII,S$GLB,, | Performed by: STUDENT IN AN ORGANIZED HEALTH CARE EDUCATION/TRAINING PROGRAM

## 2022-10-06 PROCEDURE — 99204 PR OFFICE/OUTPT VISIT, NEW, LEVL IV, 45-59 MIN: ICD-10-PCS | Mod: 25,S$GLB,, | Performed by: STUDENT IN AN ORGANIZED HEALTH CARE EDUCATION/TRAINING PROGRAM

## 2022-10-06 PROCEDURE — 1101F PT FALLS ASSESS-DOCD LE1/YR: CPT | Mod: CPTII,S$GLB,, | Performed by: STUDENT IN AN ORGANIZED HEALTH CARE EDUCATION/TRAINING PROGRAM

## 2022-10-06 PROCEDURE — 3288F FALL RISK ASSESSMENT DOCD: CPT | Mod: CPTII,S$GLB,, | Performed by: STUDENT IN AN ORGANIZED HEALTH CARE EDUCATION/TRAINING PROGRAM

## 2022-10-06 PROCEDURE — 1125F PR PAIN SEVERITY QUANTIFIED, PAIN PRESENT: ICD-10-PCS | Mod: CPTII,S$GLB,, | Performed by: STUDENT IN AN ORGANIZED HEALTH CARE EDUCATION/TRAINING PROGRAM

## 2022-10-06 PROCEDURE — 3288F PR FALLS RISK ASSESSMENT DOCUMENTED: ICD-10-PCS | Mod: CPTII,S$GLB,, | Performed by: STUDENT IN AN ORGANIZED HEALTH CARE EDUCATION/TRAINING PROGRAM

## 2022-10-06 PROCEDURE — 3008F BODY MASS INDEX DOCD: CPT | Mod: CPTII,S$GLB,, | Performed by: STUDENT IN AN ORGANIZED HEALTH CARE EDUCATION/TRAINING PROGRAM

## 2022-10-06 PROCEDURE — 1125F AMNT PAIN NOTED PAIN PRSNT: CPT | Mod: CPTII,S$GLB,, | Performed by: STUDENT IN AN ORGANIZED HEALTH CARE EDUCATION/TRAINING PROGRAM

## 2022-10-06 NOTE — PATIENT INSTRUCTIONS
Assessment:  Tramaine Resendiz is a 67 y.o. male   Chief Complaint   Patient presents with    Left Hip - Pain       Encounter Diagnosis   Name Primary?    Greater trochanteric pain syndrome Yes        Plan:  Instructed and issued HEP for greater trochanteric pain syndrome  Going to hold off on cortisone injection at this time due to patient's last A1C reading.  Will reconsider if patient can get his A1C readings under control.  Patient is going to talk with PCP about retesting and monitoring level.  Apply topical diclofenac (Voltaren) up to 4 times a day to the affected area.  It can be bought over the counter at any local pharmacy.    Continue to take over the counter ibuprofen as instructed on the bottle  Follow up in 3 months or sooner as needed    Follow-up: 3 months or sooner if there are any problems between now and then.    Thank you for choosing [facility] and Dr. Zeb Ponce for your orthopedic & sports medicine care. It is our goal to provide you with exceptional care that will help keep you healthy, active, and get you back in the game.    Please do not hesitate to reach out to us via email, phone, or MyChart with any questions, concerns, or feedback.    If you felt that you received exemplary care today, please consider leaving us feedback on [facility] at:  [email or web address]    If you are experiencing pain/discomfort ,or have questions after 5pm and would like to be connected to the [facility] on-call team, please call this number and specify which Sports Medicine provider is treating you: [phone]

## 2022-10-06 NOTE — PROGRESS NOTES
Patient ID: Tramaine Resendiz  YOB: 1955  MRN: [id number]    Chief Complaint: Pain of the Left Hip      Referred By: Jeane Doyle MD for Left Hip Pain    History of Present Illness: Tramaine Resendiz is a right-hand dominant 67 y.o. male who presents today with left hip pain that had a gradual onset over the last 2 months. Patient rates the pain at an 8/10 and states the majority of the pain is located at the lateral aspect of the hip.  He also states that he has left knee pain and that his hip pain is a constant throbbing and burning that will radiate into his knee.  Patient states that he hasn't found anything to alleviate the hip pain, but tries to take Motrin and Tylenol.  He states that when he gets down in a low crouched position and laying down position it is hard for him to get back up from that position. He is not currently performing any physical therapy.   He denies any previous injury to this hip and has not had a previous cortisone injection to this hip.    The patient is active in none.  Occupation: Retired      Past Medical History:   Past Medical History:   Diagnosis Date    Type 2 diabetes mellitus without complications      Past Surgical History:   Procedure Laterality Date    HERNIA REPAIR       History reviewed. No pertinent family history.  Social History     Socioeconomic History    Marital status:    Tobacco Use    Smoking status: Former     Types: Cigarettes    Smokeless tobacco: Never   Substance and Sexual Activity    Alcohol use: Yes       Review of patient's allergies indicates:  No Known Allergies    Physical Exam:   Body mass index is 26.11 kg/m².    GENERAL: Well appearing, in no acute distress.  HEAD: Normocephalic and atraumatic.  ENT: External ears and nose grossly normal.  EYES: EOMI bilaterally  PULMONARY: Respirations are grossly even and non-labored.  NEURO: Awake, alert, and oriented x 3.  SKIN: No obvious rashes appreciated.  PSYCH: Mood & affect are  appropriate.    Detailed MSK exam:     Left hip exam:  -ROM: internal rotation 20, external rotation 30  -JOSE negative, FADIR negative, Eusebio negative  -Muscle strength 5/5 abduction, adduction, flexion, extension  -Negative log roll  -Negative straight leg raise  -TTP greater trochanter    Right hip exam:  -ROM: internal rotation 30, external rotation 40  -JOSE negative, FADIR negative, Eusebio negative  -Muscle strength 5/5 abduction, adduction, flexion, extension  -Negative log roll  -Negative straight leg raise      Imaging:  X-ray Knee Ortho Left  Narrative: EXAMINATION:  XR KNEE ORTHO LEFT    CLINICAL HISTORY:  Pain in left knee    TECHNIQUE:  AP standing of both knees, Merchant views of both knees as well as a lateral view of the left knee were performed.    COMPARISON:  None    FINDINGS:  There is no radiographic evidence of acute osseous, articular, or soft tissue abnormality.  There is mild tricompartmental osteoarthritis in the left knee with similar findings noted on the right.  Impression: Degenerative findings as above    Electronically signed by: Zenon Self MD  Date:    09/26/2022  Time:    10:45  X-Ray Hips Bilateral 2 View Incl AP Pelvis  Narrative: EXAMINATION:  XR HIPS BILATERAL 2 VIEW INCL AP PELVIS    CLINICAL HISTORY:  Pain in unspecified hip    TECHNIQUE:  AP view of the pelvis and frogleg lateral views of both hips were performed.    COMPARISON:  None.    FINDINGS:  There is no radiographic evidence of acute osseous, articular, or soft tissue abnormality.  There are degenerative marginal productive changes present at the bilateral acetabula with otherwise fairly well maintained hip joint spaces bilaterally.  There are some degenerative findings noted in the visualized lower lumbar spine.  Impression: Degenerative findings as above    Electronically signed by: Zenon Self MD  Date:    09/26/2022  Time:    10:40        Relevant imaging results were reviewed and interpreted by me and  per my read shows mild arthritic changes with no acute abnormalities.  This was discussed with the patient and / or family today.     Assessment:  Tramaine Resendiz is a 67 y.o. male presenting with left hip pain.   History, physical and radiographs are consistent with a likely diagnosis of GTPS.   Plan: considered steroid injection but will hold off at this time d/t his A1c being 13.2 and uncontrolled. Trial voltaren gel OTC, ice and home exercises. Continue conservative management for pain.   Follow up in 3 months. All questions answered.      Greater trochanteric pain syndrome     At least 15 minutes were spent instructing the patient in therapeutic exercises.  All questions were answered and exercises were provided in the After Visit Summary.  This service was performed by Saundra Kerr, Sports Medicine Assistant under direction of Zeb Ponce MD.  CPT 05902-CC.    A copy of today's visit note has been sent to the referring provider.     Electronically signed:  Zeb Ponce MD, MPH  10/06/2022  7:23 AM

## 2022-10-07 ENCOUNTER — TELEPHONE (OUTPATIENT)
Dept: INTERNAL MEDICINE | Facility: CLINIC | Age: 67
End: 2022-10-07
Payer: MEDICARE

## 2022-10-11 ENCOUNTER — OFFICE VISIT (OUTPATIENT)
Dept: INTERNAL MEDICINE | Facility: CLINIC | Age: 67
End: 2022-10-11
Payer: MEDICARE

## 2022-10-11 VITALS
OXYGEN SATURATION: 98 % | WEIGHT: 184.31 LBS | TEMPERATURE: 98 F | HEIGHT: 70 IN | HEART RATE: 91 BPM | BODY MASS INDEX: 26.39 KG/M2 | SYSTOLIC BLOOD PRESSURE: 136 MMHG | DIASTOLIC BLOOD PRESSURE: 86 MMHG

## 2022-10-11 DIAGNOSIS — Z91.199 NONCOMPLIANCE: ICD-10-CM

## 2022-10-11 DIAGNOSIS — R97.20 ABNORMAL PSA: ICD-10-CM

## 2022-10-11 DIAGNOSIS — E78.5 HYPERLIPIDEMIA ASSOCIATED WITH TYPE 2 DIABETES MELLITUS: ICD-10-CM

## 2022-10-11 DIAGNOSIS — E11.69 TYPE 2 DIABETES MELLITUS WITH OTHER SPECIFIED COMPLICATION, UNSPECIFIED WHETHER LONG TERM INSULIN USE: Primary | ICD-10-CM

## 2022-10-11 DIAGNOSIS — E11.69 HYPERLIPIDEMIA ASSOCIATED WITH TYPE 2 DIABETES MELLITUS: ICD-10-CM

## 2022-10-11 PROCEDURE — 1160F RVW MEDS BY RX/DR IN RCRD: CPT | Mod: CPTII,S$GLB,,

## 2022-10-11 PROCEDURE — 3066F PR DOCUMENTATION OF TREATMENT FOR NEPHROPATHY: ICD-10-PCS | Mod: CPTII,S$GLB,,

## 2022-10-11 PROCEDURE — 1160F PR REVIEW ALL MEDS BY PRESCRIBER/CLIN PHARMACIST DOCUMENTED: ICD-10-PCS | Mod: CPTII,S$GLB,,

## 2022-10-11 PROCEDURE — 3008F PR BODY MASS INDEX (BMI) DOCUMENTED: ICD-10-PCS | Mod: CPTII,S$GLB,,

## 2022-10-11 PROCEDURE — 1159F MED LIST DOCD IN RCRD: CPT | Mod: CPTII,S$GLB,,

## 2022-10-11 PROCEDURE — 3288F PR FALLS RISK ASSESSMENT DOCUMENTED: ICD-10-PCS | Mod: CPTII,S$GLB,,

## 2022-10-11 PROCEDURE — 1101F PR PT FALLS ASSESS DOC 0-1 FALLS W/OUT INJ PAST YR: ICD-10-PCS | Mod: CPTII,S$GLB,,

## 2022-10-11 PROCEDURE — 3060F POS MICROALBUMINURIA REV: CPT | Mod: CPTII,S$GLB,,

## 2022-10-11 PROCEDURE — 3066F NEPHROPATHY DOC TX: CPT | Mod: CPTII,S$GLB,,

## 2022-10-11 PROCEDURE — 1159F PR MEDICATION LIST DOCUMENTED IN MEDICAL RECORD: ICD-10-PCS | Mod: CPTII,S$GLB,,

## 2022-10-11 PROCEDURE — 99215 PR OFFICE/OUTPT VISIT, EST, LEVL V, 40-54 MIN: ICD-10-PCS | Mod: S$GLB,,,

## 2022-10-11 PROCEDURE — 1125F PR PAIN SEVERITY QUANTIFIED, PAIN PRESENT: ICD-10-PCS | Mod: CPTII,S$GLB,,

## 2022-10-11 PROCEDURE — 99999 PR PBB SHADOW E&M-EST. PATIENT-LVL IV: CPT | Mod: PBBFAC,,,

## 2022-10-11 PROCEDURE — 3075F PR MOST RECENT SYSTOLIC BLOOD PRESS GE 130-139MM HG: ICD-10-PCS | Mod: CPTII,S$GLB,,

## 2022-10-11 PROCEDURE — 1101F PT FALLS ASSESS-DOCD LE1/YR: CPT | Mod: CPTII,S$GLB,,

## 2022-10-11 PROCEDURE — 99215 OFFICE O/P EST HI 40 MIN: CPT | Mod: S$GLB,,,

## 2022-10-11 PROCEDURE — 3008F BODY MASS INDEX DOCD: CPT | Mod: CPTII,S$GLB,,

## 2022-10-11 PROCEDURE — 3046F HEMOGLOBIN A1C LEVEL >9.0%: CPT | Mod: CPTII,S$GLB,,

## 2022-10-11 PROCEDURE — 3079F PR MOST RECENT DIASTOLIC BLOOD PRESSURE 80-89 MM HG: ICD-10-PCS | Mod: CPTII,S$GLB,,

## 2022-10-11 PROCEDURE — 3060F PR POS MICROALBUMINURIA RESULT DOCUMENTED/REVIEW: ICD-10-PCS | Mod: CPTII,S$GLB,,

## 2022-10-11 PROCEDURE — 1125F AMNT PAIN NOTED PAIN PRSNT: CPT | Mod: CPTII,S$GLB,,

## 2022-10-11 PROCEDURE — 99999 PR PBB SHADOW E&M-EST. PATIENT-LVL IV: ICD-10-PCS | Mod: PBBFAC,,,

## 2022-10-11 PROCEDURE — 3288F FALL RISK ASSESSMENT DOCD: CPT | Mod: CPTII,S$GLB,,

## 2022-10-11 PROCEDURE — 3075F SYST BP GE 130 - 139MM HG: CPT | Mod: CPTII,S$GLB,,

## 2022-10-11 PROCEDURE — 3046F PR MOST RECENT HEMOGLOBIN A1C LEVEL > 9.0%: ICD-10-PCS | Mod: CPTII,S$GLB,,

## 2022-10-11 PROCEDURE — 3079F DIAST BP 80-89 MM HG: CPT | Mod: CPTII,S$GLB,,

## 2022-10-11 RX ORDER — SIMVASTATIN 10 MG/1
10 TABLET, FILM COATED ORAL NIGHTLY
Qty: 90 TABLET | Refills: 3 | Status: SHIPPED | OUTPATIENT
Start: 2022-10-11 | End: 2023-09-15 | Stop reason: SDUPTHER

## 2022-10-11 RX ORDER — LANCETS
EACH MISCELLANEOUS
Qty: 100 EACH | Refills: 2 | Status: SHIPPED | OUTPATIENT
Start: 2022-10-11

## 2022-10-11 RX ORDER — GLYBURIDE 5 MG/1
5 TABLET ORAL
Qty: 90 TABLET | Refills: 3 | Status: SHIPPED | OUTPATIENT
Start: 2022-10-11 | End: 2022-12-12 | Stop reason: SDUPTHER

## 2022-10-11 RX ORDER — METFORMIN HYDROCHLORIDE 1000 MG/1
1000 TABLET ORAL 2 TIMES DAILY WITH MEALS
Qty: 180 TABLET | Refills: 3 | Status: SHIPPED | OUTPATIENT
Start: 2022-10-11 | End: 2022-12-12

## 2022-10-11 RX ORDER — INSULIN PUMP SYRINGE, 3 ML
EACH MISCELLANEOUS
Qty: 1 EACH | Refills: 0 | Status: SHIPPED | OUTPATIENT
Start: 2022-10-11 | End: 2024-02-16

## 2022-10-11 NOTE — PROGRESS NOTES
Tramaine Resendiz  10/11/2022  [id number]    Primary Doctor No  Patient Care Team:  Primary Doctor No as PCP - General          Visit Type:a scheduled routine follow-up visit    Chief Complaint:  Chief Complaint   Patient presents with    Follow-up         History of Present Illness:      Lab Results   Component Value Date    HGBA1C 13.2 (H) 09/26/2022    At visit, patient states that he has medicine at home that he takes on occasions. He is not sure what medications he is taking. Thinks he may be on metformin.    States previous PCP told him to lose weight to control his DM  He lost over 40 pounds.  Lately he has not been watching what he eats  His wife checks BG occasionally. Not sure of his BG levels      The 10-year ASCVD risk score (Malcom HANDLEY, et al., 2019) is: 11.3%    Values used to calculate the score:      Age: 67 years      Sex: Male      Is Non- : Yes      Diabetic: No      Tobacco smoker: No      Systolic Blood Pressure: 136 mmHg      Is BP treated: No      HDL Cholesterol: 52 mg/dL      Total Cholesterol: 204 mg/dL     History:  Past Medical History:   Diagnosis Date    Type 2 diabetes mellitus without complications      Past Surgical History:   Procedure Laterality Date    HERNIA REPAIR       No family history on file.  Social History     Socioeconomic History    Marital status:    Tobacco Use    Smoking status: Former     Types: Cigarettes    Smokeless tobacco: Never   Substance and Sexual Activity    Alcohol use: Yes     Patient Active Problem List   Diagnosis    Impingement syndrome of right shoulder    Incomplete tear of right rotator cuff    Erectile dysfunction    Noncompliance    Type 2 diabetes mellitus with hyperglycemia, without long-term current use of insulin     Review of patient's allergies indicates:  No Known Allergies    The following were reviewed at this visit: active problem list, medication list, allergies, family history, social history, and health  maintenance.    Medications:  No current outpatient medications on file prior to visit.     No current facility-administered medications on file prior to visit.       Medications have been reviewed and reconciled with patient at this visit.  Barriers to medications reviewed with patient.    Adverse reactions to current medications reviewed with patient..    Over the counter medications reviewed and reconciled with patient.    Exam:  Wt Readings from Last 3 Encounters:   10/06/22 82.6 kg (182 lb)   09/26/22 82.8 kg (182 lb 8.7 oz)   01/08/19 90.2 kg (198 lb 11.9 oz)     Temp Readings from Last 3 Encounters:   09/26/22 97.7 °F (36.5 °C) (Temporal)   01/08/19 97.9 °F (36.6 °C) (Oral)     BP Readings from Last 3 Encounters:   09/26/22 136/87   01/08/19 (!) 164/87     Pulse Readings from Last 3 Encounters:   09/26/22 95   01/08/19 72     There is no height or weight on file to calculate BMI.      Review of Systems   Constitutional: Negative.  Negative for chills and fever.   HENT:  Negative for congestion, sinus pain and sore throat.    Eyes:  Negative for blurred vision and double vision.   Respiratory:  Negative for cough, sputum production, shortness of breath and wheezing.    Cardiovascular:  Negative for chest pain, palpitations and leg swelling.   Musculoskeletal:  Positive for joint pain.   Skin:  Negative for rash.   Neurological: Negative.    Psychiatric/Behavioral:  Negative for depression and substance abuse.    Physical Exam  Vitals and nursing note reviewed.   Constitutional:       General: He is not in acute distress.     Appearance: He is well-developed. He is not diaphoretic.   HENT:      Head: Normocephalic and atraumatic.      Right Ear: External ear normal.      Left Ear: External ear normal.   Eyes:      General:         Right eye: No discharge.         Left eye: No discharge.      Conjunctiva/sclera: Conjunctivae normal.      Pupils: Pupils are equal, round, and reactive to light.   Neck:       Thyroid: No thyromegaly.   Cardiovascular:      Rate and Rhythm: Normal rate and regular rhythm.      Heart sounds: Normal heart sounds. No murmur heard.  Pulmonary:      Effort: Pulmonary effort is normal. No respiratory distress.      Breath sounds: Normal breath sounds. No wheezing.   Neurological:      Mental Status: He is alert and oriented to person, place, and time.   Psychiatric:         Behavior: Behavior normal.         Thought Content: Thought content normal.         Judgment: Judgment normal.       Laboratory Reviewed ({Yes)  Lab Results   Component Value Date    WBC 7.92 09/26/2022    HGB 14.6 09/26/2022    HCT 48.9 09/26/2022     09/26/2022    CHOL 204 (H) 09/26/2022    TRIG 137 09/26/2022    HDL 52 09/26/2022    ALT 19 09/26/2022    AST 16 09/26/2022     09/26/2022    K 4.5 09/26/2022     09/26/2022    CREATININE 1.0 09/26/2022    BUN 14 09/26/2022    CO2 25 09/26/2022    PSA 5.5 (H) 09/26/2022    HGBA1C 13.2 (H) 09/26/2022       Tramaine was seen today for follow-up.    Diagnoses and all orders for this visit:    Type 2 diabetes mellitus with other specified complication, unspecified whether long term insulin use  -     blood-glucose meter kit; To check BG 1-2 times daily, to use with insurance preferred meter  -     lancets Misc; To check BG 1-2 times daily, to use with insurance preferred meter  -     blood sugar diagnostic Strp; To check BG 1-2 times daily, to use with insurance preferred meter  -     metFORMIN (GLUCOPHAGE) 1000 MG tablet; Take 1 tablet (1,000 mg total) by mouth 2 (two) times daily with meals.  -     glyBURIDE (DIABETA) 5 MG tablet; Take 1 tablet (5 mg total) by mouth daily with breakfast.  -     Cancel: Ambulatory referral/consult to Optometry; Future    Abnormal PSA    Noncompliance    Hyperlipidemia associated with type 2 diabetes mellitus  -     simvastatin (ZOCOR) 10 MG tablet; Take 1 tablet (10 mg total) by mouth every evening.      Will schedule an appt  with Urology and follow up with Dr. Doyle    Discussed long term affects of elevated BG levels. Pt states that he does not want insulin and that he will not take it because he does not want to give him himself a daily or weekly injection. He would like to remain on oral DM meds for now. Explained importance of managing  BG levels    Ask pt to check BG levels daily and bring BG logs to next appt.  Not interested in DM education or digital DM program    There are other unrelated non-urgent complaints, but due to the busy schedule and the amount of time I've already spent with him, time does not permit me to address these routine issues at today's visit. I've requested another appointment to review these additional issues.  Will address additional health maintenance at next appt     Care Plan/Goals: Reviewed    Goals    None         Follow up: No follow-ups on file.    After visit summary was printed and given to patient upon discharge today.  Patient goals and care plan are included in After Visit Summary.

## 2022-11-03 DIAGNOSIS — M79.672 PAIN IN LEFT FOOT: Primary | ICD-10-CM

## 2022-11-08 ENCOUNTER — HOSPITAL ENCOUNTER (OUTPATIENT)
Dept: RADIOLOGY | Facility: HOSPITAL | Age: 67
Discharge: HOME | End: 2022-11-08
Attending: PODIATRIST
Payer: MEDICARE

## 2022-11-08 DIAGNOSIS — M79.672 PAIN IN LEFT FOOT: ICD-10-CM

## 2022-11-08 PROCEDURE — 73630 X-RAY EXAM OF FOOT: CPT | Mod: TC,LT

## 2022-11-08 PROCEDURE — 73630 X-RAY EXAM OF FOOT: CPT | Mod: 26,LT,, | Performed by: RADIOLOGY

## 2022-11-08 PROCEDURE — 73630 XR FOOT COMPLETE 3 VIEW LEFT: ICD-10-PCS | Mod: 26,LT,, | Performed by: RADIOLOGY

## 2022-11-16 ENCOUNTER — TELEPHONE (OUTPATIENT)
Dept: ADMINISTRATIVE | Facility: HOSPITAL | Age: 67
End: 2022-11-16
Payer: MEDICARE

## 2022-11-28 ENCOUNTER — OFFICE VISIT (OUTPATIENT)
Dept: OPHTHALMOLOGY | Facility: CLINIC | Age: 67
End: 2022-11-28
Payer: MEDICARE

## 2022-11-28 DIAGNOSIS — E11.69 TYPE 2 DIABETES MELLITUS WITH OTHER SPECIFIED COMPLICATION, UNSPECIFIED WHETHER LONG TERM INSULIN USE: ICD-10-CM

## 2022-11-28 DIAGNOSIS — E11.9 DIABETES MELLITUS TYPE 2 WITHOUT RETINOPATHY: Primary | ICD-10-CM

## 2022-11-28 DIAGNOSIS — E11.36 DIABETIC CATARACT: ICD-10-CM

## 2022-11-28 DIAGNOSIS — H52.7 REFRACTIVE ERRORS: ICD-10-CM

## 2022-11-28 PROCEDURE — 1159F MED LIST DOCD IN RCRD: CPT | Mod: CPTII,S$GLB,, | Performed by: OPTOMETRIST

## 2022-11-28 PROCEDURE — 3060F PR POS MICROALBUMINURIA RESULT DOCUMENTED/REVIEW: ICD-10-PCS | Mod: CPTII,S$GLB,, | Performed by: OPTOMETRIST

## 2022-11-28 PROCEDURE — 2023F DILAT RTA XM W/O RTNOPTHY: CPT | Mod: CPTII,S$GLB,, | Performed by: OPTOMETRIST

## 2022-11-28 PROCEDURE — 3066F NEPHROPATHY DOC TX: CPT | Mod: CPTII,S$GLB,, | Performed by: OPTOMETRIST

## 2022-11-28 PROCEDURE — 3060F POS MICROALBUMINURIA REV: CPT | Mod: CPTII,S$GLB,, | Performed by: OPTOMETRIST

## 2022-11-28 PROCEDURE — 92004 COMPRE OPH EXAM NEW PT 1/>: CPT | Mod: S$GLB,,, | Performed by: OPTOMETRIST

## 2022-11-28 PROCEDURE — 99999 PR PBB SHADOW E&M-EST. PATIENT-LVL II: CPT | Mod: PBBFAC,,, | Performed by: OPTOMETRIST

## 2022-11-28 PROCEDURE — 92015 PR REFRACTION: ICD-10-PCS | Mod: S$GLB,,, | Performed by: OPTOMETRIST

## 2022-11-28 PROCEDURE — 3046F HEMOGLOBIN A1C LEVEL >9.0%: CPT | Mod: CPTII,S$GLB,, | Performed by: OPTOMETRIST

## 2022-11-28 PROCEDURE — 2023F PR DILATED RETINAL EXAM W/O EVID OF RETINOPATHY: ICD-10-PCS | Mod: CPTII,S$GLB,, | Performed by: OPTOMETRIST

## 2022-11-28 PROCEDURE — 92015 DETERMINE REFRACTIVE STATE: CPT | Mod: S$GLB,,, | Performed by: OPTOMETRIST

## 2022-11-28 PROCEDURE — 92004 PR EYE EXAM, NEW PATIENT,COMPREHESV: ICD-10-PCS | Mod: S$GLB,,, | Performed by: OPTOMETRIST

## 2022-11-28 PROCEDURE — 1159F PR MEDICATION LIST DOCUMENTED IN MEDICAL RECORD: ICD-10-PCS | Mod: CPTII,S$GLB,, | Performed by: OPTOMETRIST

## 2022-11-28 PROCEDURE — 99999 PR PBB SHADOW E&M-EST. PATIENT-LVL II: ICD-10-PCS | Mod: PBBFAC,,, | Performed by: OPTOMETRIST

## 2022-11-28 PROCEDURE — 3066F PR DOCUMENTATION OF TREATMENT FOR NEPHROPATHY: ICD-10-PCS | Mod: CPTII,S$GLB,, | Performed by: OPTOMETRIST

## 2022-11-28 PROCEDURE — 3046F PR MOST RECENT HEMOGLOBIN A1C LEVEL > 9.0%: ICD-10-PCS | Mod: CPTII,S$GLB,, | Performed by: OPTOMETRIST

## 2022-11-28 NOTE — PROGRESS NOTES
HPI    NIDDM exam  No visual complaints   New patient last eye exam 5 years ago.  Update glasses RX.  Lab Results       Component                Value               Date                       HGBA1C                   13.2 (H)            09/26/2022              Last edited by Shabana Soto MA on 11/28/2022  9:08 AM.            Assessment /Plan     For exam results, see Encounter Report.    Diabetes mellitus type 2 without retinopathy    Type 2 diabetes mellitus with other specified complication, unspecified whether long term insulin use  -     Ambulatory referral/consult to Optometry    Diabetic cataract    Refractive errors      No Background Diabetic Retinopathy    Mild cataracts OU, not surgical.    Dispense Final Rx for glasses or may use OTC glasses.  RTC 1 year  Discussed above and answered questions.

## 2022-11-29 ENCOUNTER — LAB VISIT (OUTPATIENT)
Dept: LAB | Facility: HOSPITAL | Age: 67
End: 2022-11-29
Attending: NURSE PRACTITIONER
Payer: MEDICARE

## 2022-11-29 ENCOUNTER — OFFICE VISIT (OUTPATIENT)
Dept: UROLOGY | Facility: CLINIC | Age: 67
End: 2022-11-29
Payer: MEDICARE

## 2022-11-29 DIAGNOSIS — R97.20 ABNORMAL PSA: ICD-10-CM

## 2022-11-29 DIAGNOSIS — R97.20 ABNORMAL PSA: Primary | ICD-10-CM

## 2022-11-29 PROBLEM — Z91.199 NONCOMPLIANCE: Status: RESOLVED | Noted: 2021-05-12 | Resolved: 2022-11-29

## 2022-11-29 PROBLEM — M75.41 IMPINGEMENT SYNDROME OF RIGHT SHOULDER: Status: RESOLVED | Noted: 2017-07-06 | Resolved: 2022-11-29

## 2022-11-29 PROBLEM — M75.111 INCOMPLETE TEAR OF RIGHT ROTATOR CUFF: Status: RESOLVED | Noted: 2017-07-06 | Resolved: 2022-11-29

## 2022-11-29 LAB
BILIRUB SERPL-MCNC: NORMAL MG/DL
BLOOD URINE, POC: NORMAL
CLARITY, POC UA: CLEAR
COLOR, POC UA: YELLOW
COMPLEXED PSA SERPL-MCNC: 5.1 NG/ML (ref 0–4)
GLUCOSE UR QL STRIP: NORMAL
KETONES UR QL STRIP: NORMAL
LEUKOCYTE ESTERASE URINE, POC: NORMAL
NITRITE, POC UA: NORMAL
PH, POC UA: 5
PROTEIN, POC: NORMAL
SPECIFIC GRAVITY, POC UA: NORMAL
UROBILINOGEN, POC UA: NORMAL

## 2022-11-29 PROCEDURE — 99204 OFFICE O/P NEW MOD 45 MIN: CPT | Mod: S$GLB,,, | Performed by: NURSE PRACTITIONER

## 2022-11-29 PROCEDURE — 3066F NEPHROPATHY DOC TX: CPT | Mod: CPTII,S$GLB,, | Performed by: NURSE PRACTITIONER

## 2022-11-29 PROCEDURE — 84153 ASSAY OF PSA TOTAL: CPT | Performed by: NURSE PRACTITIONER

## 2022-11-29 PROCEDURE — 3060F PR POS MICROALBUMINURIA RESULT DOCUMENTED/REVIEW: ICD-10-PCS | Mod: CPTII,S$GLB,, | Performed by: NURSE PRACTITIONER

## 2022-11-29 PROCEDURE — 81002 POCT URINE DIPSTICK WITHOUT MICROSCOPE: ICD-10-PCS | Mod: S$GLB,,, | Performed by: NURSE PRACTITIONER

## 2022-11-29 PROCEDURE — 99999 PR PBB SHADOW E&M-EST. PATIENT-LVL I: CPT | Mod: PBBFAC,,, | Performed by: NURSE PRACTITIONER

## 2022-11-29 PROCEDURE — 99999 PR PBB SHADOW E&M-EST. PATIENT-LVL I: ICD-10-PCS | Mod: PBBFAC,,, | Performed by: NURSE PRACTITIONER

## 2022-11-29 PROCEDURE — 3046F HEMOGLOBIN A1C LEVEL >9.0%: CPT | Mod: CPTII,S$GLB,, | Performed by: NURSE PRACTITIONER

## 2022-11-29 PROCEDURE — 3066F PR DOCUMENTATION OF TREATMENT FOR NEPHROPATHY: ICD-10-PCS | Mod: CPTII,S$GLB,, | Performed by: NURSE PRACTITIONER

## 2022-11-29 PROCEDURE — 99204 PR OFFICE/OUTPT VISIT, NEW, LEVL IV, 45-59 MIN: ICD-10-PCS | Mod: S$GLB,,, | Performed by: NURSE PRACTITIONER

## 2022-11-29 PROCEDURE — 81002 URINALYSIS NONAUTO W/O SCOPE: CPT | Mod: S$GLB,,, | Performed by: NURSE PRACTITIONER

## 2022-11-29 PROCEDURE — 3046F PR MOST RECENT HEMOGLOBIN A1C LEVEL > 9.0%: ICD-10-PCS | Mod: CPTII,S$GLB,, | Performed by: NURSE PRACTITIONER

## 2022-11-29 PROCEDURE — 36415 COLL VENOUS BLD VENIPUNCTURE: CPT | Performed by: NURSE PRACTITIONER

## 2022-11-29 PROCEDURE — 3060F POS MICROALBUMINURIA REV: CPT | Mod: CPTII,S$GLB,, | Performed by: NURSE PRACTITIONER

## 2022-11-29 NOTE — PROGRESS NOTES
Chief Complaint:   Elevated PSA    HPI:   Patient is a 67-year-old male that is presenting with an elevated PSA, 5.5.  Patient denies gross hematuria, no BPH meds.  Denies nocturia and stream is strong during the day.  Urine in clinic is negative and PVR was 45 mL.  No prior prostate biopsies.  No  cancers in his family.No urolithiasis.  No urinary bother.    Allergies:  Patient has no known allergies.    Medications:  has a current medication list which includes the following prescription(s): blood sugar diagnostic, blood-glucose meter, glyburide, lancets, metformin, and simvastatin.    Review of Systems:  General: No fever, chills, fatigability, or weight loss.  Skin: No rashes, itching, or changes in color or texture of skin.  Chest: Denies CASTRO, cyanosis, wheezing, cough, and sputum production.  Abdomen: Appetite fine. No weight loss. Denies diarrhea, abdominal pain, hematemesis, or blood in stool.  Musculoskeletal: No joint stiffness or swelling. Denies back pain.  : As above.  All other review of systems negative.    PMH:   has a past medical history of Hypertension and Type 2 diabetes mellitus without complications (2012).    PSH:   has a past surgical history that includes Hernia repair.    FamHx: family history includes Diabetes in his mother; Hypertension in his mother.    SocHx:  reports that he has quit smoking. His smoking use included cigarettes. He has never used smokeless tobacco. He reports current alcohol use. No history on file for drug use.      Physical Exam:  General: A&Ox3, no apparent distress, no deformities  Neck: No masses, normal thyroid  Lungs: normal inspiration, no use of accessory muscles  Heart: normal pulse, no arrhythmias  Abdomen: Soft, NT, ND, no masses, no hernias, no hepatosplenomegaly  Lymphatic: Neck and groin nodes negative  Skin: The skin is warm and dry. No jaundice.  Ext: No c/c/e.  : Test desc ap, no abnormalities of epididymus. Penis uncircumcised, with normal  penile and scrotal skin. Meatus normal. Normal rectal tone, no hemorrhoids. Prost 40 gm no nodules or masses appreciated. SV not palpable. Perineum and anus normal.    Labs/Studies:    Latest Reference Range & Units 09/26/22 10:29   PSA, Screen 0.00 - 4.00 ng/mL 5.5 (H)   (H): Data is abnormally high  Impression/Plan:   Elevated PSA  Will repeat PSA levels today.  Patient is aware if repeat PSA is elevated, I would recommend we proceed with prostate MRI.

## 2022-11-30 DIAGNOSIS — R97.20 ELEVATED PSA: Primary | ICD-10-CM

## 2022-12-12 ENCOUNTER — LAB VISIT (OUTPATIENT)
Dept: LAB | Facility: HOSPITAL | Age: 67
End: 2022-12-12
Attending: FAMILY MEDICINE
Payer: MEDICARE

## 2022-12-12 ENCOUNTER — OFFICE VISIT (OUTPATIENT)
Dept: INTERNAL MEDICINE | Facility: CLINIC | Age: 67
End: 2022-12-12
Payer: MEDICARE

## 2022-12-12 VITALS
WEIGHT: 191.13 LBS | SYSTOLIC BLOOD PRESSURE: 136 MMHG | DIASTOLIC BLOOD PRESSURE: 84 MMHG | OXYGEN SATURATION: 99 % | TEMPERATURE: 98 F | HEART RATE: 94 BPM | BODY MASS INDEX: 27.36 KG/M2 | HEIGHT: 70 IN

## 2022-12-12 DIAGNOSIS — Z12.11 SCREEN FOR COLON CANCER: ICD-10-CM

## 2022-12-12 DIAGNOSIS — Z91.199 NONCOMPLIANCE: ICD-10-CM

## 2022-12-12 DIAGNOSIS — E78.5 HYPERLIPIDEMIA ASSOCIATED WITH TYPE 2 DIABETES MELLITUS: ICD-10-CM

## 2022-12-12 DIAGNOSIS — E11.69 TYPE 2 DIABETES MELLITUS WITH OTHER SPECIFIED COMPLICATION, UNSPECIFIED WHETHER LONG TERM INSULIN USE: ICD-10-CM

## 2022-12-12 DIAGNOSIS — R97.20 ABNORMAL PSA: ICD-10-CM

## 2022-12-12 DIAGNOSIS — E11.69 HYPERLIPIDEMIA ASSOCIATED WITH TYPE 2 DIABETES MELLITUS: ICD-10-CM

## 2022-12-12 DIAGNOSIS — E11.69 TYPE 2 DIABETES MELLITUS WITH OTHER SPECIFIED COMPLICATION, UNSPECIFIED WHETHER LONG TERM INSULIN USE: Primary | ICD-10-CM

## 2022-12-12 LAB
ESTIMATED AVG GLUCOSE: 249 MG/DL (ref 68–131)
HBA1C MFR BLD: 10.3 % (ref 4–5.6)

## 2022-12-12 PROCEDURE — 1101F PR PT FALLS ASSESS DOC 0-1 FALLS W/OUT INJ PAST YR: ICD-10-PCS | Mod: HCNC,CPTII,S$GLB, | Performed by: FAMILY MEDICINE

## 2022-12-12 PROCEDURE — 3060F POS MICROALBUMINURIA REV: CPT | Mod: HCNC,CPTII,S$GLB, | Performed by: FAMILY MEDICINE

## 2022-12-12 PROCEDURE — 3288F FALL RISK ASSESSMENT DOCD: CPT | Mod: HCNC,CPTII,S$GLB, | Performed by: FAMILY MEDICINE

## 2022-12-12 PROCEDURE — 3079F PR MOST RECENT DIASTOLIC BLOOD PRESSURE 80-89 MM HG: ICD-10-PCS | Mod: HCNC,CPTII,S$GLB, | Performed by: FAMILY MEDICINE

## 2022-12-12 PROCEDURE — 1159F PR MEDICATION LIST DOCUMENTED IN MEDICAL RECORD: ICD-10-PCS | Mod: HCNC,CPTII,S$GLB, | Performed by: FAMILY MEDICINE

## 2022-12-12 PROCEDURE — 99499 RISK ADDL DX/OHS AUDIT: ICD-10-PCS | Mod: HCNC,S$GLB,, | Performed by: FAMILY MEDICINE

## 2022-12-12 PROCEDURE — 1101F PT FALLS ASSESS-DOCD LE1/YR: CPT | Mod: HCNC,CPTII,S$GLB, | Performed by: FAMILY MEDICINE

## 2022-12-12 PROCEDURE — 99214 OFFICE O/P EST MOD 30 MIN: CPT | Mod: HCNC,S$GLB,, | Performed by: FAMILY MEDICINE

## 2022-12-12 PROCEDURE — 99214 PR OFFICE/OUTPT VISIT, EST, LEVL IV, 30-39 MIN: ICD-10-PCS | Mod: HCNC,S$GLB,, | Performed by: FAMILY MEDICINE

## 2022-12-12 PROCEDURE — 99499 UNLISTED E&M SERVICE: CPT | Mod: HCNC,S$GLB,, | Performed by: FAMILY MEDICINE

## 2022-12-12 PROCEDURE — 36415 COLL VENOUS BLD VENIPUNCTURE: CPT | Mod: HCNC | Performed by: FAMILY MEDICINE

## 2022-12-12 PROCEDURE — 3075F PR MOST RECENT SYSTOLIC BLOOD PRESS GE 130-139MM HG: ICD-10-PCS | Mod: HCNC,CPTII,S$GLB, | Performed by: FAMILY MEDICINE

## 2022-12-12 PROCEDURE — 3008F BODY MASS INDEX DOCD: CPT | Mod: HCNC,CPTII,S$GLB, | Performed by: FAMILY MEDICINE

## 2022-12-12 PROCEDURE — 99999 PR PBB SHADOW E&M-EST. PATIENT-LVL III: ICD-10-PCS | Mod: PBBFAC,HCNC,, | Performed by: FAMILY MEDICINE

## 2022-12-12 PROCEDURE — 1126F PR PAIN SEVERITY QUANTIFIED, NO PAIN PRESENT: ICD-10-PCS | Mod: HCNC,CPTII,S$GLB, | Performed by: FAMILY MEDICINE

## 2022-12-12 PROCEDURE — 1159F MED LIST DOCD IN RCRD: CPT | Mod: HCNC,CPTII,S$GLB, | Performed by: FAMILY MEDICINE

## 2022-12-12 PROCEDURE — 99999 PR PBB SHADOW E&M-EST. PATIENT-LVL III: CPT | Mod: PBBFAC,HCNC,, | Performed by: FAMILY MEDICINE

## 2022-12-12 PROCEDURE — 3288F PR FALLS RISK ASSESSMENT DOCUMENTED: ICD-10-PCS | Mod: HCNC,CPTII,S$GLB, | Performed by: FAMILY MEDICINE

## 2022-12-12 PROCEDURE — 3046F HEMOGLOBIN A1C LEVEL >9.0%: CPT | Mod: HCNC,CPTII,S$GLB, | Performed by: FAMILY MEDICINE

## 2022-12-12 PROCEDURE — 3066F PR DOCUMENTATION OF TREATMENT FOR NEPHROPATHY: ICD-10-PCS | Mod: HCNC,CPTII,S$GLB, | Performed by: FAMILY MEDICINE

## 2022-12-12 PROCEDURE — 3046F PR MOST RECENT HEMOGLOBIN A1C LEVEL > 9.0%: ICD-10-PCS | Mod: HCNC,CPTII,S$GLB, | Performed by: FAMILY MEDICINE

## 2022-12-12 PROCEDURE — 1160F RVW MEDS BY RX/DR IN RCRD: CPT | Mod: HCNC,CPTII,S$GLB, | Performed by: FAMILY MEDICINE

## 2022-12-12 PROCEDURE — 83036 HEMOGLOBIN GLYCOSYLATED A1C: CPT | Mod: HCNC | Performed by: FAMILY MEDICINE

## 2022-12-12 PROCEDURE — 3075F SYST BP GE 130 - 139MM HG: CPT | Mod: HCNC,CPTII,S$GLB, | Performed by: FAMILY MEDICINE

## 2022-12-12 PROCEDURE — 3079F DIAST BP 80-89 MM HG: CPT | Mod: HCNC,CPTII,S$GLB, | Performed by: FAMILY MEDICINE

## 2022-12-12 PROCEDURE — 1126F AMNT PAIN NOTED NONE PRSNT: CPT | Mod: HCNC,CPTII,S$GLB, | Performed by: FAMILY MEDICINE

## 2022-12-12 PROCEDURE — 3060F PR POS MICROALBUMINURIA RESULT DOCUMENTED/REVIEW: ICD-10-PCS | Mod: HCNC,CPTII,S$GLB, | Performed by: FAMILY MEDICINE

## 2022-12-12 PROCEDURE — 3066F NEPHROPATHY DOC TX: CPT | Mod: HCNC,CPTII,S$GLB, | Performed by: FAMILY MEDICINE

## 2022-12-12 PROCEDURE — 1160F PR REVIEW ALL MEDS BY PRESCRIBER/CLIN PHARMACIST DOCUMENTED: ICD-10-PCS | Mod: HCNC,CPTII,S$GLB, | Performed by: FAMILY MEDICINE

## 2022-12-12 PROCEDURE — 3008F PR BODY MASS INDEX (BMI) DOCUMENTED: ICD-10-PCS | Mod: HCNC,CPTII,S$GLB, | Performed by: FAMILY MEDICINE

## 2022-12-12 RX ORDER — GLYBURIDE 5 MG/1
5 TABLET ORAL
Qty: 90 TABLET | Refills: 3 | Status: SHIPPED | OUTPATIENT
Start: 2022-12-12 | End: 2022-12-13 | Stop reason: SDUPTHER

## 2022-12-12 RX ORDER — METFORMIN HYDROCHLORIDE 500 MG/1
500 TABLET, EXTENDED RELEASE ORAL 2 TIMES DAILY WITH MEALS
Qty: 180 TABLET | Refills: 3 | Status: SHIPPED | OUTPATIENT
Start: 2022-12-12 | End: 2023-03-23

## 2022-12-12 NOTE — PROGRESS NOTES
Tramaine Resendiz  12/12/2022  [id number]    Jeane Doyle MD  Patient Care Team:  Jeane Doyle MD as PCP - General (Family Medicine)  Sultana Ibarra NP as Nurse Practitioner (Internal Medicine)          Visit Type:a scheduled routine follow-up visit    Chief Complaint:  Chief Complaint   Patient presents with    Follow-up     Dm f/u       History of Present Illness:  67 year old  Here for 3 month follow up DM    He had HgA1c done 3 months ago  Not in control  Lab Results   Component Value Date    HGBA1C 13.2 (H) 09/26/2022   He is on Mefromin and Diabeta.  At last visit he was unsure what meds he was taking and only said she checked his BS at times.    He has seen Urology for ED and elevated PSA    He reported intentional weight loss this year.  However DM does not appear controlled  He had declined any injections for tx of his DM.  He did not want DM education.    He also has chronic shoulder issues, right side.    He was given fitkit for colon screen.  Did not return.    He as to follow up with urology  PSA 5.1  He was to do Cr, and MRI of prostate.  They havne't been able to reach him to schedule      He had diarrhea with the regular metformin    He is on Diabeta.    His wife checks his BS.  He has no idea what his numbers are running.          History:  Past Medical History:   Diagnosis Date    Hypertension     Type 2 diabetes mellitus without complications 2012    BS didn't check 11/28/2022     Past Surgical History:   Procedure Laterality Date    HERNIA REPAIR       Family History   Problem Relation Age of Onset    Hypertension Mother     Diabetes Mother      Social History     Socioeconomic History    Marital status:    Tobacco Use    Smoking status: Former     Types: Cigarettes    Smokeless tobacco: Never   Substance and Sexual Activity    Alcohol use: Yes     Patient Active Problem List   Diagnosis    Erectile dysfunction    Type 2 diabetes mellitus with hyperglycemia, without long-term  current use of insulin     Review of patient's allergies indicates:  No Known Allergies    The following were reviewed at this visit: active problem list, medication list, allergies, family history, social history, and health maintenance.    Medications:  Current Outpatient Medications on File Prior to Visit   Medication Sig Dispense Refill    blood sugar diagnostic Strp To check BG 1-2 times daily, to use with insurance preferred meter 100 strip 2    blood-glucose meter kit To check BG 1-2 times daily, to use with insurance preferred meter 1 each 0    glyBURIDE (DIABETA) 5 MG tablet Take 1 tablet (5 mg total) by mouth daily with breakfast. 90 tablet 3    lancets Misc To check BG 1-2 times daily, to use with insurance preferred meter 100 each 2    simvastatin (ZOCOR) 10 MG tablet Take 1 tablet (10 mg total) by mouth every evening. 90 tablet 3    [DISCONTINUED] metFORMIN (GLUCOPHAGE) 1000 MG tablet Take 1 tablet (1,000 mg total) by mouth 2 (two) times daily with meals. (Patient not taking: Reported on 12/12/2022) 180 tablet 3     No current facility-administered medications on file prior to visit.       Medications have been reviewed and reconciled with patient at this visit.  Barriers to medications reviewed with patient.    Adverse reactions to current medications reviewed with patient..    Over the counter medications reviewed and reconciled with patient.    Exam:  Wt Readings from Last 3 Encounters:   12/12/22 86.7 kg (191 lb 2.2 oz)   10/11/22 83.6 kg (184 lb 4.9 oz)   10/06/22 82.6 kg (182 lb)     Temp Readings from Last 3 Encounters:   12/12/22 97.6 °F (36.4 °C) (Tympanic)   10/11/22 97.9 °F (36.6 °C) (Temporal)   09/26/22 97.7 °F (36.5 °C) (Temporal)     BP Readings from Last 3 Encounters:   12/12/22 136/84   10/11/22 136/86   09/26/22 136/87     Pulse Readings from Last 3 Encounters:   12/12/22 94   10/11/22 91   09/26/22 95     Body mass index is 27.43 kg/m².      Review of Systems   Constitutional:  Negative.  Negative for chills and fever.   HENT: Negative.  Negative for congestion, sinus pain and sore throat.    Eyes:  Negative for blurred vision and double vision.   Respiratory:  Negative for cough, sputum production, shortness of breath and wheezing.    Cardiovascular:  Negative for chest pain, palpitations and leg swelling.   Gastrointestinal:  Negative for abdominal pain, constipation, diarrhea, heartburn, nausea and vomiting.   Genitourinary: Negative.    Musculoskeletal: Negative.    Skin: Negative.  Negative for rash.   Neurological: Negative.    Endo/Heme/Allergies: Negative.  Negative for polydipsia. Does not bruise/bleed easily.   Psychiatric/Behavioral:  Negative for depression and substance abuse.    Physical Exam  Nursing note reviewed.   Cardiovascular:      Rate and Rhythm: Normal rate and regular rhythm.   Pulmonary:      Effort: Pulmonary effort is normal. No respiratory distress.   Neurological:      Mental Status: He is alert and oriented to person, place, and time.   Psychiatric:         Mood and Affect: Mood normal.         Behavior: Behavior normal.         Thought Content: Thought content normal.         Judgment: Judgment normal.     Protective Sensation (w/ 10 gram monofilament):  Right: Intact  Left: Intact    Visual Inspection:  Dry Skin -  Bilateral    Pedal Pulses:   Right: Present  Left: Present    Posterior tibialis:   Right:Present  Left: Present   Laboratory Reviewed ({Yes)  Lab Results   Component Value Date    WBC 7.92 09/26/2022    HGB 14.6 09/26/2022    HCT 48.9 09/26/2022     09/26/2022    CHOL 204 (H) 09/26/2022    TRIG 137 09/26/2022    HDL 52 09/26/2022    ALT 19 09/26/2022    AST 16 09/26/2022     09/26/2022    K 4.5 09/26/2022     09/26/2022    CREATININE 1.0 09/26/2022    BUN 14 09/26/2022    CO2 25 09/26/2022    PSA 5.5 (H) 09/26/2022    HGBA1C 13.2 (H) 09/26/2022       Tramaine was seen today for follow-up.    Diagnoses and all orders for this  visit:    Type 2 diabetes mellitus with other specified complication, unspecified whether long term insulin use  -     Hemoglobin A1C; Future  Check labs      Abnormal PSA  Needs MRI  Noncompliance    Hyperlipidemia associated with type 2 diabetes mellitus  On statin    Change to Long Acting. Still refuses Injection.  Other orders  -     metFORMIN (GLUCOPHAGE-XR) 500 MG ER 24hr tablet; Take 1 tablet (500 mg total) by mouth 2 (two) times daily with meals.                Care Plan/Goals: Reviewed    Goals    None         Follow up: No follow-ups on file.    After visit summary was printed and given to patient upon discharge today.  Patient goals and care plan are included in After Visit Summary.

## 2022-12-13 DIAGNOSIS — E11.69 TYPE 2 DIABETES MELLITUS WITH OTHER SPECIFIED COMPLICATION, UNSPECIFIED WHETHER LONG TERM INSULIN USE: ICD-10-CM

## 2022-12-13 RX ORDER — GLYBURIDE 5 MG/1
5 TABLET ORAL 2 TIMES DAILY WITH MEALS
Qty: 180 TABLET | Refills: 3 | Status: SHIPPED | OUTPATIENT
Start: 2022-12-13 | End: 2023-09-15 | Stop reason: SDUPTHER

## 2022-12-21 ENCOUNTER — TELEPHONE (OUTPATIENT)
Dept: UROLOGY | Facility: CLINIC | Age: 67
End: 2022-12-21
Payer: MEDICARE

## 2022-12-21 NOTE — TELEPHONE ENCOUNTER
Attempted to contact pt regarding the MRI that he had scheduled but not done; no answer.  No voice mail.

## 2023-01-06 ENCOUNTER — OFFICE VISIT (OUTPATIENT)
Dept: ORTHOPEDICS | Facility: CLINIC | Age: 68
End: 2023-01-06
Payer: MEDICARE

## 2023-01-06 ENCOUNTER — LAB VISIT (OUTPATIENT)
Dept: LAB | Facility: HOSPITAL | Age: 68
End: 2023-01-06
Attending: NURSE PRACTITIONER
Payer: MEDICARE

## 2023-01-06 VITALS — WEIGHT: 191.13 LBS | BODY MASS INDEX: 27.36 KG/M2 | HEIGHT: 70 IN

## 2023-01-06 DIAGNOSIS — M25.559 GREATER TROCHANTERIC PAIN SYNDROME: Primary | ICD-10-CM

## 2023-01-06 DIAGNOSIS — R97.20 ELEVATED PSA: ICD-10-CM

## 2023-01-06 LAB
CREAT SERPL-MCNC: 1.1 MG/DL (ref 0.5–1.4)
EST. GFR  (NO RACE VARIABLE): >60 ML/MIN/1.73 M^2

## 2023-01-06 PROCEDURE — 82565 ASSAY OF CREATININE: CPT | Mod: HCNC | Performed by: NURSE PRACTITIONER

## 2023-01-06 PROCEDURE — 1126F AMNT PAIN NOTED NONE PRSNT: CPT | Mod: HCNC,CPTII,S$GLB, | Performed by: STUDENT IN AN ORGANIZED HEALTH CARE EDUCATION/TRAINING PROGRAM

## 2023-01-06 PROCEDURE — 1101F PT FALLS ASSESS-DOCD LE1/YR: CPT | Mod: HCNC,CPTII,S$GLB, | Performed by: STUDENT IN AN ORGANIZED HEALTH CARE EDUCATION/TRAINING PROGRAM

## 2023-01-06 PROCEDURE — 1126F PR PAIN SEVERITY QUANTIFIED, NO PAIN PRESENT: ICD-10-PCS | Mod: HCNC,CPTII,S$GLB, | Performed by: STUDENT IN AN ORGANIZED HEALTH CARE EDUCATION/TRAINING PROGRAM

## 2023-01-06 PROCEDURE — 99999 PR PBB SHADOW E&M-EST. PATIENT-LVL II: ICD-10-PCS | Mod: PBBFAC,HCNC,, | Performed by: STUDENT IN AN ORGANIZED HEALTH CARE EDUCATION/TRAINING PROGRAM

## 2023-01-06 PROCEDURE — 3288F PR FALLS RISK ASSESSMENT DOCUMENTED: ICD-10-PCS | Mod: HCNC,CPTII,S$GLB, | Performed by: STUDENT IN AN ORGANIZED HEALTH CARE EDUCATION/TRAINING PROGRAM

## 2023-01-06 PROCEDURE — 3008F BODY MASS INDEX DOCD: CPT | Mod: HCNC,CPTII,S$GLB, | Performed by: STUDENT IN AN ORGANIZED HEALTH CARE EDUCATION/TRAINING PROGRAM

## 2023-01-06 PROCEDURE — 3008F PR BODY MASS INDEX (BMI) DOCUMENTED: ICD-10-PCS | Mod: HCNC,CPTII,S$GLB, | Performed by: STUDENT IN AN ORGANIZED HEALTH CARE EDUCATION/TRAINING PROGRAM

## 2023-01-06 PROCEDURE — 1160F PR REVIEW ALL MEDS BY PRESCRIBER/CLIN PHARMACIST DOCUMENTED: ICD-10-PCS | Mod: HCNC,CPTII,S$GLB, | Performed by: STUDENT IN AN ORGANIZED HEALTH CARE EDUCATION/TRAINING PROGRAM

## 2023-01-06 PROCEDURE — 99999 PR PBB SHADOW E&M-EST. PATIENT-LVL II: CPT | Mod: PBBFAC,HCNC,, | Performed by: STUDENT IN AN ORGANIZED HEALTH CARE EDUCATION/TRAINING PROGRAM

## 2023-01-06 PROCEDURE — 1160F RVW MEDS BY RX/DR IN RCRD: CPT | Mod: HCNC,CPTII,S$GLB, | Performed by: STUDENT IN AN ORGANIZED HEALTH CARE EDUCATION/TRAINING PROGRAM

## 2023-01-06 PROCEDURE — 99214 OFFICE O/P EST MOD 30 MIN: CPT | Mod: HCNC,S$GLB,, | Performed by: STUDENT IN AN ORGANIZED HEALTH CARE EDUCATION/TRAINING PROGRAM

## 2023-01-06 PROCEDURE — 36415 COLL VENOUS BLD VENIPUNCTURE: CPT | Mod: HCNC | Performed by: NURSE PRACTITIONER

## 2023-01-06 PROCEDURE — 99214 PR OFFICE/OUTPT VISIT, EST, LEVL IV, 30-39 MIN: ICD-10-PCS | Mod: HCNC,S$GLB,, | Performed by: STUDENT IN AN ORGANIZED HEALTH CARE EDUCATION/TRAINING PROGRAM

## 2023-01-06 PROCEDURE — 1159F PR MEDICATION LIST DOCUMENTED IN MEDICAL RECORD: ICD-10-PCS | Mod: HCNC,CPTII,S$GLB, | Performed by: STUDENT IN AN ORGANIZED HEALTH CARE EDUCATION/TRAINING PROGRAM

## 2023-01-06 PROCEDURE — 3072F LOW RISK FOR RETINOPATHY: CPT | Mod: HCNC,CPTII,S$GLB, | Performed by: STUDENT IN AN ORGANIZED HEALTH CARE EDUCATION/TRAINING PROGRAM

## 2023-01-06 PROCEDURE — 3288F FALL RISK ASSESSMENT DOCD: CPT | Mod: HCNC,CPTII,S$GLB, | Performed by: STUDENT IN AN ORGANIZED HEALTH CARE EDUCATION/TRAINING PROGRAM

## 2023-01-06 PROCEDURE — 1101F PR PT FALLS ASSESS DOC 0-1 FALLS W/OUT INJ PAST YR: ICD-10-PCS | Mod: HCNC,CPTII,S$GLB, | Performed by: STUDENT IN AN ORGANIZED HEALTH CARE EDUCATION/TRAINING PROGRAM

## 2023-01-06 PROCEDURE — 1159F MED LIST DOCD IN RCRD: CPT | Mod: HCNC,CPTII,S$GLB, | Performed by: STUDENT IN AN ORGANIZED HEALTH CARE EDUCATION/TRAINING PROGRAM

## 2023-01-06 PROCEDURE — 3072F PR LOW RISK FOR RETINOPATHY: ICD-10-PCS | Mod: HCNC,CPTII,S$GLB, | Performed by: STUDENT IN AN ORGANIZED HEALTH CARE EDUCATION/TRAINING PROGRAM

## 2023-01-17 ENCOUNTER — HOSPITAL ENCOUNTER (OUTPATIENT)
Dept: RADIOLOGY | Facility: HOSPITAL | Age: 68
Discharge: HOME | End: 2023-01-17
Attending: NURSE PRACTITIONER
Payer: MEDICARE

## 2023-01-17 DIAGNOSIS — R97.20 ELEVATED PSA: ICD-10-CM

## 2023-01-17 PROCEDURE — 72197 MRI PELVIS W/O & W/DYE: CPT | Mod: TC

## 2023-01-17 PROCEDURE — 25500020 PHARM REV CODE 255: Performed by: NURSE PRACTITIONER

## 2023-01-17 PROCEDURE — A9585 GADOBUTROL INJECTION: HCPCS | Performed by: NURSE PRACTITIONER

## 2023-01-17 PROCEDURE — 72197 MRI PROSTATE W W/O CONTRAST: ICD-10-PCS | Mod: 26,,, | Performed by: RADIOLOGY

## 2023-01-17 PROCEDURE — 72197 MRI PELVIS W/O & W/DYE: CPT | Mod: 26,,, | Performed by: RADIOLOGY

## 2023-01-17 RX ORDER — GADOBUTROL 604.72 MG/ML
10 INJECTION INTRAVENOUS
Status: COMPLETED | OUTPATIENT
Start: 2023-01-17 | End: 2023-01-17

## 2023-01-17 RX ADMIN — GADOBUTROL 9 ML: 604.72 INJECTION INTRAVENOUS at 01:01

## 2023-02-10 ENCOUNTER — PATIENT MESSAGE (OUTPATIENT)
Dept: UROLOGY | Facility: CLINIC | Age: 68
End: 2023-02-10
Payer: MEDICARE

## 2023-02-10 NOTE — TELEPHONE ENCOUNTER
Attempted to contact pt at all numbers listed in his chart; no answer. Unable to leave message to inform him that prostate MRI indicated clinically significant cancer unlikely to be present.  This is reassuring, however, needs to be seen by a physician to review MRI results, PSA and discuss future plan of care.  I have scheduled a followup appt for pt and mailed it.

## 2023-03-13 ENCOUNTER — OFFICE VISIT (OUTPATIENT)
Dept: INTERNAL MEDICINE | Facility: CLINIC | Age: 68
End: 2023-03-13
Payer: MEDICARE

## 2023-03-13 ENCOUNTER — LAB VISIT (OUTPATIENT)
Dept: LAB | Facility: HOSPITAL | Age: 68
End: 2023-03-13
Payer: MEDICARE

## 2023-03-13 VITALS
BODY MASS INDEX: 27.08 KG/M2 | WEIGHT: 189.13 LBS | TEMPERATURE: 98 F | SYSTOLIC BLOOD PRESSURE: 136 MMHG | OXYGEN SATURATION: 99 % | HEART RATE: 88 BPM | DIASTOLIC BLOOD PRESSURE: 68 MMHG | HEIGHT: 70 IN

## 2023-03-13 DIAGNOSIS — E11.69 HYPERLIPIDEMIA ASSOCIATED WITH TYPE 2 DIABETES MELLITUS: ICD-10-CM

## 2023-03-13 DIAGNOSIS — E78.5 HYPERLIPIDEMIA ASSOCIATED WITH TYPE 2 DIABETES MELLITUS: ICD-10-CM

## 2023-03-13 DIAGNOSIS — E11.69 TYPE 2 DIABETES MELLITUS WITH OTHER SPECIFIED COMPLICATION, UNSPECIFIED WHETHER LONG TERM INSULIN USE: Primary | ICD-10-CM

## 2023-03-13 DIAGNOSIS — R97.20 ABNORMAL PSA: ICD-10-CM

## 2023-03-13 DIAGNOSIS — E11.69 TYPE 2 DIABETES MELLITUS WITH OTHER SPECIFIED COMPLICATION, UNSPECIFIED WHETHER LONG TERM INSULIN USE: ICD-10-CM

## 2023-03-13 DIAGNOSIS — N52.9 ERECTILE DYSFUNCTION, UNSPECIFIED ERECTILE DYSFUNCTION TYPE: ICD-10-CM

## 2023-03-13 DIAGNOSIS — Z12.11 SCREEN FOR COLON CANCER: ICD-10-CM

## 2023-03-13 LAB
ALBUMIN SERPL BCP-MCNC: 4.1 G/DL (ref 3.5–5.2)
ALP SERPL-CCNC: 136 U/L (ref 55–135)
ALT SERPL W/O P-5'-P-CCNC: 16 U/L (ref 10–44)
ANION GAP SERPL CALC-SCNC: 6 MMOL/L (ref 8–16)
AST SERPL-CCNC: 12 U/L (ref 10–40)
BILIRUB SERPL-MCNC: 0.3 MG/DL (ref 0.1–1)
BUN SERPL-MCNC: 18 MG/DL (ref 8–23)
CALCIUM SERPL-MCNC: 9.8 MG/DL (ref 8.7–10.5)
CHLORIDE SERPL-SCNC: 103 MMOL/L (ref 95–110)
CO2 SERPL-SCNC: 29 MMOL/L (ref 23–29)
CREAT SERPL-MCNC: 1.1 MG/DL (ref 0.5–1.4)
EST. GFR  (NO RACE VARIABLE): >60 ML/MIN/1.73 M^2
ESTIMATED AVG GLUCOSE: 303 MG/DL (ref 68–131)
GLUCOSE SERPL-MCNC: 380 MG/DL (ref 70–110)
HBA1C MFR BLD: 12.2 % (ref 4–5.6)
POTASSIUM SERPL-SCNC: 4.8 MMOL/L (ref 3.5–5.1)
PROT SERPL-MCNC: 7.7 G/DL (ref 6–8.4)
SODIUM SERPL-SCNC: 138 MMOL/L (ref 136–145)

## 2023-03-13 PROCEDURE — 3078F PR MOST RECENT DIASTOLIC BLOOD PRESSURE < 80 MM HG: ICD-10-PCS | Mod: HCNC,CPTII,S$GLB,

## 2023-03-13 PROCEDURE — 1159F PR MEDICATION LIST DOCUMENTED IN MEDICAL RECORD: ICD-10-PCS | Mod: HCNC,CPTII,S$GLB,

## 2023-03-13 PROCEDURE — 3008F PR BODY MASS INDEX (BMI) DOCUMENTED: ICD-10-PCS | Mod: HCNC,CPTII,S$GLB,

## 2023-03-13 PROCEDURE — 1159F MED LIST DOCD IN RCRD: CPT | Mod: HCNC,CPTII,S$GLB,

## 2023-03-13 PROCEDURE — 1126F PR PAIN SEVERITY QUANTIFIED, NO PAIN PRESENT: ICD-10-PCS | Mod: HCNC,CPTII,S$GLB,

## 2023-03-13 PROCEDURE — 99999 PR PBB SHADOW E&M-EST. PATIENT-LVL III: CPT | Mod: PBBFAC,HCNC,,

## 2023-03-13 PROCEDURE — 1101F PR PT FALLS ASSESS DOC 0-1 FALLS W/OUT INJ PAST YR: ICD-10-PCS | Mod: HCNC,CPTII,S$GLB,

## 2023-03-13 PROCEDURE — 1126F AMNT PAIN NOTED NONE PRSNT: CPT | Mod: HCNC,CPTII,S$GLB,

## 2023-03-13 PROCEDURE — 3288F FALL RISK ASSESSMENT DOCD: CPT | Mod: HCNC,CPTII,S$GLB,

## 2023-03-13 PROCEDURE — 99214 PR OFFICE/OUTPT VISIT, EST, LEVL IV, 30-39 MIN: ICD-10-PCS | Mod: HCNC,S$GLB,,

## 2023-03-13 PROCEDURE — 3072F LOW RISK FOR RETINOPATHY: CPT | Mod: HCNC,CPTII,S$GLB,

## 2023-03-13 PROCEDURE — 1101F PT FALLS ASSESS-DOCD LE1/YR: CPT | Mod: HCNC,CPTII,S$GLB,

## 2023-03-13 PROCEDURE — 99214 OFFICE O/P EST MOD 30 MIN: CPT | Mod: HCNC,S$GLB,,

## 2023-03-13 PROCEDURE — 1160F PR REVIEW ALL MEDS BY PRESCRIBER/CLIN PHARMACIST DOCUMENTED: ICD-10-PCS | Mod: HCNC,CPTII,S$GLB,

## 2023-03-13 PROCEDURE — 1160F RVW MEDS BY RX/DR IN RCRD: CPT | Mod: HCNC,CPTII,S$GLB,

## 2023-03-13 PROCEDURE — 99999 PR PBB SHADOW E&M-EST. PATIENT-LVL III: ICD-10-PCS | Mod: PBBFAC,HCNC,,

## 2023-03-13 PROCEDURE — 36415 COLL VENOUS BLD VENIPUNCTURE: CPT | Mod: HCNC

## 2023-03-13 PROCEDURE — 3072F PR LOW RISK FOR RETINOPATHY: ICD-10-PCS | Mod: HCNC,CPTII,S$GLB,

## 2023-03-13 PROCEDURE — 3075F SYST BP GE 130 - 139MM HG: CPT | Mod: HCNC,CPTII,S$GLB,

## 2023-03-13 PROCEDURE — 3288F PR FALLS RISK ASSESSMENT DOCUMENTED: ICD-10-PCS | Mod: HCNC,CPTII,S$GLB,

## 2023-03-13 PROCEDURE — 3078F DIAST BP <80 MM HG: CPT | Mod: HCNC,CPTII,S$GLB,

## 2023-03-13 PROCEDURE — 3075F PR MOST RECENT SYSTOLIC BLOOD PRESS GE 130-139MM HG: ICD-10-PCS | Mod: HCNC,CPTII,S$GLB,

## 2023-03-13 PROCEDURE — 3008F BODY MASS INDEX DOCD: CPT | Mod: HCNC,CPTII,S$GLB,

## 2023-03-13 PROCEDURE — 80053 COMPREHEN METABOLIC PANEL: CPT | Mod: HCNC

## 2023-03-13 PROCEDURE — 83036 HEMOGLOBIN GLYCOSYLATED A1C: CPT | Mod: HCNC

## 2023-03-13 NOTE — PROGRESS NOTES
Tramaine Resendiz  03/13/2023  [id number]    Jeane Doyle MD  Patient Care Team:  Jeane Doyle MD as PCP - General (Family Medicine)  Sultana Ibarra NP as Nurse Practitioner (Internal Medicine)          Visit Type:a scheduled routine follow-up visit    Chief Complaint:  Chief Complaint   Patient presents with    Follow-up       History of Present Illness:    DM  Lab Results   Component Value Date    HGBA1C 10.3 (H) 12/12/2022   Here for 3 month follow up DM  He is taking Glyburide 5 mg BID  He has not been taking metformin. States that he was having GI upset  Checks BG levels at times    Elevated PSA  MRI did not show cancer   Having problems with ED  Has an appt with Dr. Baum on Thursday             History:  Past Medical History:   Diagnosis Date    Hypertension     Type 2 diabetes mellitus without complications 2012    BS didn't check 11/28/2022     Past Surgical History:   Procedure Laterality Date    HERNIA REPAIR       Family History   Problem Relation Age of Onset    Hypertension Mother     Diabetes Mother      Social History     Socioeconomic History    Marital status:    Tobacco Use    Smoking status: Former     Types: Cigarettes    Smokeless tobacco: Never   Substance and Sexual Activity    Alcohol use: Yes     Patient Active Problem List   Diagnosis    Erectile dysfunction    Type 2 diabetes mellitus with hyperglycemia, without long-term current use of insulin     Review of patient's allergies indicates:  No Known Allergies    The following were reviewed at this visit: active problem list, medication list, allergies, family history, social history, and health maintenance.    Medications:  Current Outpatient Medications on File Prior to Visit   Medication Sig Dispense Refill    blood sugar diagnostic Strp To check BG 1-2 times daily, to use with insurance preferred meter 100 strip 2    blood-glucose meter kit To check BG 1-2 times daily, to use with insurance preferred meter 1 each 0     glyBURIDE (DIABETA) 5 MG tablet Take 1 tablet (5 mg total) by mouth 2 (two) times daily with meals. 180 tablet 3    lancets Misc To check BG 1-2 times daily, to use with insurance preferred meter 100 each 2    simvastatin (ZOCOR) 10 MG tablet Take 1 tablet (10 mg total) by mouth every evening. 90 tablet 3    metFORMIN (GLUCOPHAGE-XR) 500 MG ER 24hr tablet Take 1 tablet (500 mg total) by mouth 2 (two) times daily with meals. (Patient not taking: Reported on 3/13/2023) 180 tablet 3     No current facility-administered medications on file prior to visit.       Medications have been reviewed and reconciled with patient at this visit.  Barriers to medications reviewed with patient.    Adverse reactions to current medications reviewed with patient..    Over the counter medications reviewed and reconciled with patient.    Exam:  Wt Readings from Last 3 Encounters:   03/13/23 85.8 kg (189 lb 2.5 oz)   01/06/23 86.7 kg (191 lb 2.2 oz)   12/12/22 86.7 kg (191 lb 2.2 oz)     Temp Readings from Last 3 Encounters:   03/13/23 98.3 °F (36.8 °C) (Tympanic)   12/12/22 97.6 °F (36.4 °C) (Tympanic)   10/11/22 97.9 °F (36.6 °C) (Temporal)     BP Readings from Last 3 Encounters:   03/13/23 136/68   12/12/22 136/84   10/11/22 136/86     Pulse Readings from Last 3 Encounters:   03/13/23 88   12/12/22 94   10/11/22 91     Body mass index is 27.14 kg/m².      Review of Systems   Eyes:  Negative for blurred vision and double vision.   Respiratory:  Negative for shortness of breath.    Cardiovascular:  Negative for chest pain and palpitations.   Genitourinary:  Positive for frequency.   Neurological:  Negative for dizziness and headaches.   Physical Exam  Vitals and nursing note reviewed.   Constitutional:       General: He is not in acute distress.     Appearance: He is well-developed. He is not diaphoretic.   HENT:      Head: Normocephalic and atraumatic.      Right Ear: External ear normal.      Left Ear: External ear normal.   Eyes:       General:         Right eye: No discharge.         Left eye: No discharge.      Conjunctiva/sclera: Conjunctivae normal.      Pupils: Pupils are equal, round, and reactive to light.   Cardiovascular:      Rate and Rhythm: Normal rate and regular rhythm.      Heart sounds: Normal heart sounds. No murmur heard.  Pulmonary:      Effort: Pulmonary effort is normal. No respiratory distress.      Breath sounds: Normal breath sounds. No wheezing.   Abdominal:      General: Bowel sounds are normal.   Neurological:      Mental Status: He is alert and oriented to person, place, and time.   Psychiatric:         Behavior: Behavior normal.         Thought Content: Thought content normal.         Judgment: Judgment normal.       Laboratory Reviewed ({Yes)  Lab Results   Component Value Date    WBC 7.92 09/26/2022    HGB 14.6 09/26/2022    HCT 48.9 09/26/2022     09/26/2022    CHOL 204 (H) 09/26/2022    TRIG 137 09/26/2022    HDL 52 09/26/2022    ALT 19 09/26/2022    AST 16 09/26/2022     09/26/2022    K 4.5 09/26/2022     09/26/2022    CREATININE 1.1 01/06/2023    BUN 14 09/26/2022    CO2 25 09/26/2022    PSA 5.5 (H) 09/26/2022    HGBA1C 10.3 (H) 12/12/2022     Tramaine was seen today for follow-up.    Diagnoses and all orders for this visit:    Type 2 diabetes mellitus with other specified complication, unspecified whether long term insulin use  -     COMPREHENSIVE METABOLIC PANEL; Future  -     HEMOGLOBIN A1C; Future    Hyperlipidemia associated with type 2 diabetes mellitus  On Zocor 10 mg     Erectile dysfunction, unspecified erectile dysfunction type    Abnormal PSA    Screen for colon cancer  -     Ambulatory referral/consult to Endo Procedure ; Future      Urology  Has an appt with urology on this week  Will discuss meds for ED     Pt states that he is not taking metformin any longer  Will get A1C today  Not interested in taking an injection   Will need medication adjustment after labs  result  Discussed long term effects of elevated BG levels     Schedule colonoscopy    Will schedule follow up appointment with Dr. Doyle in 3 months  Would like to wait on vaccines     Care Plan/Goals: Reviewed    Goals    None         Follow up: No follow-ups on file.    After visit summary was printed and given to patient upon discharge today.  Patient goals and care plan are included in After Visit Summary.

## 2023-03-16 ENCOUNTER — OFFICE VISIT (OUTPATIENT)
Dept: UROLOGY | Facility: CLINIC | Age: 68
End: 2023-03-16
Payer: MEDICARE

## 2023-03-16 VITALS
SYSTOLIC BLOOD PRESSURE: 138 MMHG | RESPIRATION RATE: 18 BRPM | TEMPERATURE: 97 F | HEIGHT: 70 IN | WEIGHT: 191.25 LBS | DIASTOLIC BLOOD PRESSURE: 86 MMHG | HEART RATE: 99 BPM | BODY MASS INDEX: 27.38 KG/M2

## 2023-03-16 DIAGNOSIS — R97.20 ELEVATED PSA: Primary | ICD-10-CM

## 2023-03-16 DIAGNOSIS — N52.9 ERECTILE DYSFUNCTION, UNSPECIFIED ERECTILE DYSFUNCTION TYPE: ICD-10-CM

## 2023-03-16 DIAGNOSIS — R39.12 BENIGN PROSTATIC HYPERPLASIA WITH WEAK URINARY STREAM: ICD-10-CM

## 2023-03-16 DIAGNOSIS — N40.1 BENIGN PROSTATIC HYPERPLASIA WITH WEAK URINARY STREAM: ICD-10-CM

## 2023-03-16 PROCEDURE — 3046F HEMOGLOBIN A1C LEVEL >9.0%: CPT | Mod: HCNC,CPTII,S$GLB, | Performed by: UROLOGY

## 2023-03-16 PROCEDURE — 1159F PR MEDICATION LIST DOCUMENTED IN MEDICAL RECORD: ICD-10-PCS | Mod: HCNC,CPTII,S$GLB, | Performed by: UROLOGY

## 2023-03-16 PROCEDURE — 3072F LOW RISK FOR RETINOPATHY: CPT | Mod: HCNC,CPTII,S$GLB, | Performed by: UROLOGY

## 2023-03-16 PROCEDURE — 1126F PR PAIN SEVERITY QUANTIFIED, NO PAIN PRESENT: ICD-10-PCS | Mod: HCNC,CPTII,S$GLB, | Performed by: UROLOGY

## 2023-03-16 PROCEDURE — 3288F FALL RISK ASSESSMENT DOCD: CPT | Mod: HCNC,CPTII,S$GLB, | Performed by: UROLOGY

## 2023-03-16 PROCEDURE — 3075F PR MOST RECENT SYSTOLIC BLOOD PRESS GE 130-139MM HG: ICD-10-PCS | Mod: HCNC,CPTII,S$GLB, | Performed by: UROLOGY

## 2023-03-16 PROCEDURE — 99214 OFFICE O/P EST MOD 30 MIN: CPT | Mod: HCNC,S$GLB,, | Performed by: UROLOGY

## 2023-03-16 PROCEDURE — 1101F PT FALLS ASSESS-DOCD LE1/YR: CPT | Mod: HCNC,CPTII,S$GLB, | Performed by: UROLOGY

## 2023-03-16 PROCEDURE — 99214 PR OFFICE/OUTPT VISIT, EST, LEVL IV, 30-39 MIN: ICD-10-PCS | Mod: HCNC,S$GLB,, | Performed by: UROLOGY

## 2023-03-16 PROCEDURE — 3072F PR LOW RISK FOR RETINOPATHY: ICD-10-PCS | Mod: HCNC,CPTII,S$GLB, | Performed by: UROLOGY

## 2023-03-16 PROCEDURE — 3079F DIAST BP 80-89 MM HG: CPT | Mod: HCNC,CPTII,S$GLB, | Performed by: UROLOGY

## 2023-03-16 PROCEDURE — 1126F AMNT PAIN NOTED NONE PRSNT: CPT | Mod: HCNC,CPTII,S$GLB, | Performed by: UROLOGY

## 2023-03-16 PROCEDURE — 1101F PR PT FALLS ASSESS DOC 0-1 FALLS W/OUT INJ PAST YR: ICD-10-PCS | Mod: HCNC,CPTII,S$GLB, | Performed by: UROLOGY

## 2023-03-16 PROCEDURE — 3046F PR MOST RECENT HEMOGLOBIN A1C LEVEL > 9.0%: ICD-10-PCS | Mod: HCNC,CPTII,S$GLB, | Performed by: UROLOGY

## 2023-03-16 PROCEDURE — 3288F PR FALLS RISK ASSESSMENT DOCUMENTED: ICD-10-PCS | Mod: HCNC,CPTII,S$GLB, | Performed by: UROLOGY

## 2023-03-16 PROCEDURE — 3079F PR MOST RECENT DIASTOLIC BLOOD PRESSURE 80-89 MM HG: ICD-10-PCS | Mod: HCNC,CPTII,S$GLB, | Performed by: UROLOGY

## 2023-03-16 PROCEDURE — 3008F PR BODY MASS INDEX (BMI) DOCUMENTED: ICD-10-PCS | Mod: HCNC,CPTII,S$GLB, | Performed by: UROLOGY

## 2023-03-16 PROCEDURE — 3008F BODY MASS INDEX DOCD: CPT | Mod: HCNC,CPTII,S$GLB, | Performed by: UROLOGY

## 2023-03-16 PROCEDURE — 1160F PR REVIEW ALL MEDS BY PRESCRIBER/CLIN PHARMACIST DOCUMENTED: ICD-10-PCS | Mod: HCNC,CPTII,S$GLB, | Performed by: UROLOGY

## 2023-03-16 PROCEDURE — 1160F RVW MEDS BY RX/DR IN RCRD: CPT | Mod: HCNC,CPTII,S$GLB, | Performed by: UROLOGY

## 2023-03-16 PROCEDURE — 99999 PR PBB SHADOW E&M-EST. PATIENT-LVL III: CPT | Mod: PBBFAC,HCNC,, | Performed by: UROLOGY

## 2023-03-16 PROCEDURE — 99999 PR PBB SHADOW E&M-EST. PATIENT-LVL III: ICD-10-PCS | Mod: PBBFAC,HCNC,, | Performed by: UROLOGY

## 2023-03-16 PROCEDURE — 1159F MED LIST DOCD IN RCRD: CPT | Mod: HCNC,CPTII,S$GLB, | Performed by: UROLOGY

## 2023-03-16 PROCEDURE — 3075F SYST BP GE 130 - 139MM HG: CPT | Mod: HCNC,CPTII,S$GLB, | Performed by: UROLOGY

## 2023-03-16 RX ORDER — SILDENAFIL 100 MG/1
TABLET, FILM COATED ORAL
Qty: 10 TABLET | Refills: 5 | Status: ON HOLD | OUTPATIENT
Start: 2023-03-16 | End: 2024-02-12

## 2023-03-16 NOTE — PROGRESS NOTES
Chief Complaint:   Elevated PSA    HPI:   03/16/2023 - patient returns today for follow-up and review of his MRI, this shows a 45 g prostate without suspicious lesions, patient also notes issues with ED, has never tried anything for his ED, does not take any nitrates, voiding well no gross hematuria, notes that his 91 yo mom has dementia and just recently broke her hip so he is busy taking care of her    Patient is a 67-year-old male that is presenting with an elevated PSA, 5.5.  Patient denies gross hematuria, no BPH meds.  Denies nocturia and stream is strong during the day.  Urine in clinic is negative and PVR was 45 mL.  No prior prostate biopsies.  No  cancers in his family.No urolithiasis.  No urinary bother.    Allergies:  Patient has no known allergies.    Medications:  has a current medication list which includes the following prescription(s): blood sugar diagnostic, blood-glucose meter, glyburide, lancets, simvastatin, metformin, and sildenafil.    Review of Systems:  General: No fever, chills  Skin: No rashes  Chest:  Denies cough and sputum production  Heart: Denies chest pain  Resp: Denies dyspnea  Abdomen: Denies diarrhea, abdominal pain, hematemesis, or blood in stool.  Musculoskeletal: No joint stiffness or swelling. Denies back pain.  : see HPI  Neuro: no dizziness or weakness      PMH:   has a past medical history of Hypertension and Type 2 diabetes mellitus without complications (2012).    PSH:   has a past surgical history that includes Hernia repair.    FamHx: family history includes Diabetes in his mother; Hypertension in his mother.    SocHx:  reports that he has quit smoking. His smoking use included cigarettes. He has never used smokeless tobacco. He reports current alcohol use. No history on file for drug use.      Physical Exam:  General: A&Ox3, no apparent distress, no deformities  Neck: No masses, normal thyroid  Lungs: normal inspiration, no use of accessory muscles  Heart: normal  pulse, no arrhythmias  Abdomen: Soft, NT, ND, no masses, no hernias, no hepatosplenomegaly  Lymphatic: Neck and groin nodes negative  Skin: The skin is warm and dry. No jaundice.  Ext: No c/c/e.  : Test desc ap, no abnormalities of epididymus. Penis uncircumcised, with normal penile and scrotal skin. Meatus normal. Normal rectal tone, no hemorrhoids. Prost 40 gm no nodules or masses appreciated. SV not palpable. Perineum and anus normal.    MRI PROSTATE W W/O CONTRAST 01/17/2023     CLINICAL HISTORY:  prostate cancer;  Elevated prostate specific antigen (PSA)     TECHNIQUE:  Multiparametric MR imaging of the prostate gland is performed with and without the intravenous administration of 10 cc of Gadavist.     3D reconstructions: 3D reconstructions were ordered by the urologist to generate a 3D model of the prostate gland for subsequent target lesion mapping as needed for direct or fusion prostate biopsy.  I, the interpreting radiologist, performed the 3D reconstructions on [employer], an independent workstation, with reports and key images saved to PACS.     COMPARISON:  None     FINDINGS:  The prostate gland measures 5.8 x 3.9 x 4.0 cm for total volume of 45.72 cc.  The T1 sequence shows no intra prostatic hemorrhage.     The transitional zone contains hyperplastic nodules.  No suspicious transitional zone lesion.     The peripheral zone shows no diffusion restriction or suspicious T2 hypointense nodule.     Prostate capsule intact.  Rectoprostatic angle preserved and the neurovascular bundles have a normal appearance.  Normal seminal vesicles.  No pelvic lymphadenopathy.  No focal bone marrow replacing lesion in the bony pelvis.     Impression:     1. No focal suspicious lesion in the prostate gland to localize a clinically significant prostate cancer.  2. Total prostate volume 45.72 cc.  3. No pelvic lymphadenopathy.  PIRADS 2 - Low (clinically significant cancer unlikely to be present. )    Labs/Studies:     Latest Reference Range & Units 09/26/22 10:29   PSA, Screen 0.00 - 4.00 ng/mL 5.5 (H)   (H): Data is abnormally high    Impression/Plan:   Elevated PSA - recommend proceeding with a biopsy, patient not currently interested wants to observe, follow-up three months with PSA    ED - prescription for Viagra provided, reviewed side effects    Howard Baum MD

## 2023-03-23 ENCOUNTER — OFFICE VISIT (OUTPATIENT)
Dept: INTERNAL MEDICINE | Facility: CLINIC | Age: 68
End: 2023-03-23
Payer: MEDICARE

## 2023-03-23 VITALS
WEIGHT: 190.94 LBS | DIASTOLIC BLOOD PRESSURE: 70 MMHG | OXYGEN SATURATION: 99 % | TEMPERATURE: 96 F | HEIGHT: 70 IN | SYSTOLIC BLOOD PRESSURE: 122 MMHG | HEART RATE: 100 BPM | BODY MASS INDEX: 27.34 KG/M2

## 2023-03-23 DIAGNOSIS — E11.65 TYPE 2 DIABETES MELLITUS WITH HYPERGLYCEMIA, WITHOUT LONG-TERM CURRENT USE OF INSULIN: Primary | ICD-10-CM

## 2023-03-23 PROCEDURE — 99999 PR PBB SHADOW E&M-EST. PATIENT-LVL III: CPT | Mod: PBBFAC,HCNC,, | Performed by: FAMILY MEDICINE

## 2023-03-23 PROCEDURE — 3288F FALL RISK ASSESSMENT DOCD: CPT | Mod: HCNC,CPTII,S$GLB, | Performed by: FAMILY MEDICINE

## 2023-03-23 PROCEDURE — 3288F PR FALLS RISK ASSESSMENT DOCUMENTED: ICD-10-PCS | Mod: HCNC,CPTII,S$GLB, | Performed by: FAMILY MEDICINE

## 2023-03-23 PROCEDURE — 1126F PR PAIN SEVERITY QUANTIFIED, NO PAIN PRESENT: ICD-10-PCS | Mod: HCNC,CPTII,S$GLB, | Performed by: FAMILY MEDICINE

## 2023-03-23 PROCEDURE — 3046F HEMOGLOBIN A1C LEVEL >9.0%: CPT | Mod: HCNC,CPTII,S$GLB, | Performed by: FAMILY MEDICINE

## 2023-03-23 PROCEDURE — 3072F PR LOW RISK FOR RETINOPATHY: ICD-10-PCS | Mod: HCNC,CPTII,S$GLB, | Performed by: FAMILY MEDICINE

## 2023-03-23 PROCEDURE — 3078F PR MOST RECENT DIASTOLIC BLOOD PRESSURE < 80 MM HG: ICD-10-PCS | Mod: HCNC,CPTII,S$GLB, | Performed by: FAMILY MEDICINE

## 2023-03-23 PROCEDURE — 1101F PT FALLS ASSESS-DOCD LE1/YR: CPT | Mod: HCNC,CPTII,S$GLB, | Performed by: FAMILY MEDICINE

## 2023-03-23 PROCEDURE — 3008F BODY MASS INDEX DOCD: CPT | Mod: HCNC,CPTII,S$GLB, | Performed by: FAMILY MEDICINE

## 2023-03-23 PROCEDURE — 1159F PR MEDICATION LIST DOCUMENTED IN MEDICAL RECORD: ICD-10-PCS | Mod: HCNC,CPTII,S$GLB, | Performed by: FAMILY MEDICINE

## 2023-03-23 PROCEDURE — 99214 PR OFFICE/OUTPT VISIT, EST, LEVL IV, 30-39 MIN: ICD-10-PCS | Mod: HCNC,S$GLB,, | Performed by: FAMILY MEDICINE

## 2023-03-23 PROCEDURE — 3046F PR MOST RECENT HEMOGLOBIN A1C LEVEL > 9.0%: ICD-10-PCS | Mod: HCNC,CPTII,S$GLB, | Performed by: FAMILY MEDICINE

## 2023-03-23 PROCEDURE — 1159F MED LIST DOCD IN RCRD: CPT | Mod: HCNC,CPTII,S$GLB, | Performed by: FAMILY MEDICINE

## 2023-03-23 PROCEDURE — 1160F RVW MEDS BY RX/DR IN RCRD: CPT | Mod: HCNC,CPTII,S$GLB, | Performed by: FAMILY MEDICINE

## 2023-03-23 PROCEDURE — 3078F DIAST BP <80 MM HG: CPT | Mod: HCNC,CPTII,S$GLB, | Performed by: FAMILY MEDICINE

## 2023-03-23 PROCEDURE — 99214 OFFICE O/P EST MOD 30 MIN: CPT | Mod: HCNC,S$GLB,, | Performed by: FAMILY MEDICINE

## 2023-03-23 PROCEDURE — 3072F LOW RISK FOR RETINOPATHY: CPT | Mod: HCNC,CPTII,S$GLB, | Performed by: FAMILY MEDICINE

## 2023-03-23 PROCEDURE — 99999 PR PBB SHADOW E&M-EST. PATIENT-LVL III: ICD-10-PCS | Mod: PBBFAC,HCNC,, | Performed by: FAMILY MEDICINE

## 2023-03-23 PROCEDURE — 1101F PR PT FALLS ASSESS DOC 0-1 FALLS W/OUT INJ PAST YR: ICD-10-PCS | Mod: HCNC,CPTII,S$GLB, | Performed by: FAMILY MEDICINE

## 2023-03-23 PROCEDURE — 3008F PR BODY MASS INDEX (BMI) DOCUMENTED: ICD-10-PCS | Mod: HCNC,CPTII,S$GLB, | Performed by: FAMILY MEDICINE

## 2023-03-23 PROCEDURE — 1126F AMNT PAIN NOTED NONE PRSNT: CPT | Mod: HCNC,CPTII,S$GLB, | Performed by: FAMILY MEDICINE

## 2023-03-23 PROCEDURE — 3074F SYST BP LT 130 MM HG: CPT | Mod: HCNC,CPTII,S$GLB, | Performed by: FAMILY MEDICINE

## 2023-03-23 PROCEDURE — 1160F PR REVIEW ALL MEDS BY PRESCRIBER/CLIN PHARMACIST DOCUMENTED: ICD-10-PCS | Mod: HCNC,CPTII,S$GLB, | Performed by: FAMILY MEDICINE

## 2023-03-23 PROCEDURE — 3074F PR MOST RECENT SYSTOLIC BLOOD PRESSURE < 130 MM HG: ICD-10-PCS | Mod: HCNC,CPTII,S$GLB, | Performed by: FAMILY MEDICINE

## 2023-03-23 RX ORDER — INSULIN GLARGINE 300 U/ML
10 INJECTION, SOLUTION SUBCUTANEOUS DAILY
Qty: 1.5 ML | Refills: 5 | Status: SHIPPED | OUTPATIENT
Start: 2023-03-23 | End: 2023-03-27

## 2023-03-23 NOTE — PROGRESS NOTES
Tramaine Resendiz  03/23/2023  [id number]    Jeane Doyle MD  Patient Care Team:  Jeane Doyle MD as PCP - General (Family Medicine)  Sultana Ibarra NP as Nurse Practitioner (Internal Medicine)          Visit Type:a scheduled routine follow-up visit    Chief Complaint:  Chief Complaint   Patient presents with    Follow-up     From labs       History of Present Illness:    67 year old  Did labs and his BS are out of range  Lab Results   Component Value Date    HGBA1C 12.2 (H) 03/13/2023   He has been ranging in the 12-13 range for A1c.  Luckily the kidney function remains good.    He is on Diabeta and was to be on Meftormin.  Is not currently taking the metformin  He said it made him have diarrhea.      Following with Urology. His PSA remains elevated    History:  Past Medical History:   Diagnosis Date    Hypertension     Type 2 diabetes mellitus without complications 2012    BS didn't check 11/28/2022     Past Surgical History:   Procedure Laterality Date    HERNIA REPAIR       Family History   Problem Relation Age of Onset    Hypertension Mother     Diabetes Mother      Social History     Socioeconomic History    Marital status:    Tobacco Use    Smoking status: Former     Types: Cigarettes    Smokeless tobacco: Never   Substance and Sexual Activity    Alcohol use: Yes     Patient Active Problem List   Diagnosis    Erectile dysfunction    Type 2 diabetes mellitus with hyperglycemia, without long-term current use of insulin     Review of patient's allergies indicates:  No Known Allergies    The following were reviewed at this visit: active problem list, medication list, allergies, family history, social history, and health maintenance.    Medications:  Current Outpatient Medications on File Prior to Visit   Medication Sig Dispense Refill    blood sugar diagnostic Strp To check BG 1-2 times daily, to use with insurance preferred meter 100 strip 2    blood-glucose meter kit To check BG 1-2  times daily, to use with insurance preferred meter 1 each 0    glyBURIDE (DIABETA) 5 MG tablet Take 1 tablet (5 mg total) by mouth 2 (two) times daily with meals. 180 tablet 3    lancets Misc To check BG 1-2 times daily, to use with insurance preferred meter 100 each 2    simvastatin (ZOCOR) 10 MG tablet Take 1 tablet (10 mg total) by mouth every evening. 90 tablet 3    metFORMIN (GLUCOPHAGE-XR) 500 MG ER 24hr tablet Take 1 tablet (500 mg total) by mouth 2 (two) times daily with meals. (Patient not taking: Reported on 3/13/2023) 180 tablet 3    sildenafiL (VIAGRA) 100 MG tablet Take 1/2 to 1 tab by mouth 30 mins prior to sex 10 tablet 5     No current facility-administered medications on file prior to visit.       Medications have been reviewed and reconciled with patient at this visit.  Barriers to medications reviewed with patient.    Adverse reactions to current medications reviewed with patient..    Over the counter medications reviewed and reconciled with patient.    Exam:  Wt Readings from Last 3 Encounters:   03/23/23 86.6 kg (190 lb 14.7 oz)   03/16/23 86.7 kg (191 lb 4 oz)   03/13/23 85.8 kg (189 lb 2.5 oz)     Temp Readings from Last 3 Encounters:   03/23/23 96.2 °F (35.7 °C) (Tympanic)   03/16/23 97.1 °F (36.2 °C) (Oral)   03/13/23 98.3 °F (36.8 °C) (Tympanic)     BP Readings from Last 3 Encounters:   03/23/23 122/70   03/16/23 138/86   03/13/23 136/68     Pulse Readings from Last 3 Encounters:   03/23/23 100   03/16/23 99   03/13/23 88     Body mass index is 27.39 kg/m².      Review of Systems   Genitourinary:  Positive for frequency and urgency.   Physical Exam  Nursing note reviewed.   Cardiovascular:      Rate and Rhythm: Normal rate and regular rhythm.   Pulmonary:      Effort: Pulmonary effort is normal. No respiratory distress.   Neurological:      Mental Status: He is alert and oriented to person, place, and time.   Psychiatric:         Mood and Affect: Mood normal.         Behavior: Behavior  normal.         Thought Content: Thought content normal.         Judgment: Judgment normal.       Laboratory Reviewed ({Yes)  Lab Results   Component Value Date    WBC 7.92 09/26/2022    HGB 14.6 09/26/2022    HCT 48.9 09/26/2022     09/26/2022    CHOL 204 (H) 09/26/2022    TRIG 137 09/26/2022    HDL 52 09/26/2022    ALT 16 03/13/2023    AST 12 03/13/2023     03/13/2023    K 4.8 03/13/2023     03/13/2023    CREATININE 1.1 03/13/2023    BUN 18 03/13/2023    CO2 29 03/13/2023    PSA 5.5 (H) 09/26/2022    HGBA1C 12.2 (H) 03/13/2023       Tramaine was seen today for follow-up.    Diagnoses and all orders for this visit:    Type 2 diabetes mellitus with hyperglycemia, without long-term current use of insulin  -     insulin glargine, TOUJEO, (TOUJEO SOLOSTAR U-300 INSULIN) 300 unit/mL (1.5 mL) InPn pen; Inject 10 Units into the skin once daily.      Agreed to start Long Acting Insulin  Pen demonstrated in office    Phone call, audio call in 4 weeks to review BS.    Will need to titrate meds with BS readings            Care Plan/Goals: Reviewed    Goals    None         Follow up: Follow up in about 4 weeks (around 4/20/2023).    After visit summary was printed and given to patient upon discharge today.  Patient goals and care plan are included in After Visit Summary.

## 2023-03-27 ENCOUNTER — TELEPHONE (OUTPATIENT)
Dept: INTERNAL MEDICINE | Facility: CLINIC | Age: 68
End: 2023-03-27
Payer: MEDICARE

## 2023-03-27 DIAGNOSIS — E11.69 TYPE 2 DIABETES MELLITUS WITH OTHER SPECIFIED COMPLICATION, UNSPECIFIED WHETHER LONG TERM INSULIN USE: Primary | ICD-10-CM

## 2023-03-27 RX ORDER — INSULIN GLARGINE 100 [IU]/ML
10 INJECTION, SOLUTION SUBCUTANEOUS NIGHTLY
Qty: 3 ML | Refills: 1 | Status: SHIPPED | OUTPATIENT
Start: 2023-03-27 | End: 2023-04-20

## 2023-03-27 NOTE — TELEPHONE ENCOUNTER
----- Message from Ema Galeano MA sent at 3/27/2023  9:23 AM CDT -----  Regarding: FW: CANNOT AFFORD INJECTION  Contact: Wife    ----- Message -----  From: Claudia Ramirez  Sent: 3/27/2023   9:17 AM CDT  To: Vivek Ching Staff  Subject: CANNOT AFFORD INJECTION                          Type: Patient Call Back         Who called: Ольга - Wife         What is the request in detail: states the injection given to patient on Thursday, she states they cannot afford; please advise           Best call back number: [phone]         Additional Information:            Thank You

## 2023-03-27 NOTE — TELEPHONE ENCOUNTER
Spoke with pt wife who voiced understanding.    Spoke with pharmacy who stated they would need a prescription in order to run through insurance and see if covered. Stated they do not have a list of what is covered by different plans.    Full box of TOUJEO which is the order they have now (3 pens per box) 136 days is covered by his insurance but is still $105.

## 2023-03-27 NOTE — TELEPHONE ENCOUNTER
I will send in Lantus  Will need PA On this.    We will see if this is any cheaper    If he is on medicare plan, he cannot use any coupons.

## 2023-03-27 NOTE — TELEPHONE ENCOUNTER
First, call wife  This likely needs a PA with insurance. We will need to make sure PA is done.     Then will need to call the pharmacy and see which insulin is covered on his plan- to make sure we do the proper PA    Which ever one is covered, for long acting we will send.

## 2023-04-20 ENCOUNTER — OFFICE VISIT (OUTPATIENT)
Dept: INTERNAL MEDICINE | Facility: CLINIC | Age: 68
End: 2023-04-20
Payer: MEDICARE

## 2023-04-20 DIAGNOSIS — E11.65 TYPE 2 DIABETES MELLITUS WITH HYPERGLYCEMIA, WITHOUT LONG-TERM CURRENT USE OF INSULIN: Primary | ICD-10-CM

## 2023-04-20 PROCEDURE — 3046F HEMOGLOBIN A1C LEVEL >9.0%: CPT | Mod: HCNC,CPTII,95, | Performed by: FAMILY MEDICINE

## 2023-04-20 PROCEDURE — 3072F PR LOW RISK FOR RETINOPATHY: ICD-10-PCS | Mod: HCNC,CPTII,95, | Performed by: FAMILY MEDICINE

## 2023-04-20 PROCEDURE — 99441 PR PHYSICIAN TELEPHONE EVALUATION 5-10 MIN: CPT | Mod: HCNC,95,, | Performed by: FAMILY MEDICINE

## 2023-04-20 PROCEDURE — 3046F PR MOST RECENT HEMOGLOBIN A1C LEVEL > 9.0%: ICD-10-PCS | Mod: HCNC,CPTII,95, | Performed by: FAMILY MEDICINE

## 2023-04-20 PROCEDURE — 99441 PR PHYSICIAN TELEPHONE EVALUATION 5-10 MIN: ICD-10-PCS | Mod: HCNC,95,, | Performed by: FAMILY MEDICINE

## 2023-04-20 PROCEDURE — 3072F LOW RISK FOR RETINOPATHY: CPT | Mod: HCNC,CPTII,95, | Performed by: FAMILY MEDICINE

## 2023-04-20 RX ORDER — INSULIN GLARGINE 300 U/ML
10 INJECTION, SOLUTION SUBCUTANEOUS DAILY
Qty: 1 ML | Refills: 11 | Status: SHIPPED | OUTPATIENT
Start: 2023-04-20 | End: 2023-09-11

## 2023-04-20 NOTE — PROGRESS NOTES
Tramaine Resendiz  04/20/2023  [id number]    Jeane Doyle MD  Patient Care Team:  Jeane Doyle MD as PCP - General (Family Medicine)  Sultana Ibarra NP as Nurse Practitioner (Internal Medicine)      The patient location is: home  The chief complaint leading to consultation is: DM    Visit type: audio only    Face to Face time with patient: 12:45  10 minutes of total time spent on the encounter, which includes face to face time and non-face to face time preparing to see the patient (eg, review of tests), Obtaining and/or reviewing separately obtained history, Documenting clinical information in the electronic or other health record, Independently interpreting results (not separately reported) and communicating results to the patient/family/caregiver, or Care coordination (not separately reported).         Each patient to whom he or she provides medical services by telemedicine is:  (1) informed of the relationship between the physician and patient and the respective role of any other health care provider with respect to management of the patient; and (2) notified that he or she may decline to receive medical services by telemedicine and may withdraw from such care at any time.    Notes:      Visit Type:a scheduled routine follow-up visit    Chief Complaint:  No chief complaint on file.      History of Present Illness:  Audio call  Check on his BS    He did not get the shot.  He reports it was 300 dollars.  He is using [place] [facility].    His sugars are just as high.        History:  Past Medical History:   Diagnosis Date    Hypertension     Type 2 diabetes mellitus without complications 2012    BS didn't check 11/28/2022     Past Surgical History:   Procedure Laterality Date    HERNIA REPAIR       Family History   Problem Relation Age of Onset    Hypertension Mother     Diabetes Mother      Social History     Socioeconomic History    Marital status:    Tobacco Use    Smoking status: Former      Types: Cigarettes    Smokeless tobacco: Never   Substance and Sexual Activity    Alcohol use: Yes     Patient Active Problem List   Diagnosis    Erectile dysfunction    Type 2 diabetes mellitus with hyperglycemia, without long-term current use of insulin     Review of patient's allergies indicates:  No Known Allergies    The following were reviewed at this visit: active problem list, medication list, allergies, family history, social history, and health maintenance.    Medications:  Current Outpatient Medications on File Prior to Visit   Medication Sig Dispense Refill    blood sugar diagnostic Strp To check BG 1-2 times daily, to use with insurance preferred meter 100 strip 2    blood-glucose meter kit To check BG 1-2 times daily, to use with insurance preferred meter 1 each 0    glyBURIDE (DIABETA) 5 MG tablet Take 1 tablet (5 mg total) by mouth 2 (two) times daily with meals. 180 tablet 3    lancets Misc To check BG 1-2 times daily, to use with insurance preferred meter 100 each 2    sildenafiL (VIAGRA) 100 MG tablet Take 1/2 to 1 tab by mouth 30 mins prior to sex 10 tablet 5    simvastatin (ZOCOR) 10 MG tablet Take 1 tablet (10 mg total) by mouth every evening. 90 tablet 3    [DISCONTINUED] insulin (LANTUS SOLOSTAR U-100 INSULIN) glargine 100 units/mL SubQ pen Inject 10 Units into the skin every evening. 3 mL 1     No current facility-administered medications on file prior to visit.       Medications have been reviewed and reconciled with patient at this visit.  Barriers to medications reviewed with patient.    Adverse reactions to current medications reviewed with patient..    Over the counter medications reviewed and reconciled with patient.    Exam:  Wt Readings from Last 3 Encounters:   03/23/23 86.6 kg (190 lb 14.7 oz)   03/16/23 86.7 kg (191 lb 4 oz)   03/13/23 85.8 kg (189 lb 2.5 oz)     Temp Readings from Last 3 Encounters:   03/23/23 96.2 °F (35.7 °C) (Tympanic)   03/16/23 97.1 °F (36.2 °C) (Oral)    03/13/23 98.3 °F (36.8 °C) (Tympanic)     BP Readings from Last 3 Encounters:   03/23/23 122/70   03/16/23 138/86   03/13/23 136/68     Pulse Readings from Last 3 Encounters:   03/23/23 100   03/16/23 99   03/13/23 88     There is no height or weight on file to calculate BMI.      Review of Systems   Constitutional: Negative.  Negative for chills and fever.   HENT: Negative.  Negative for congestion, sinus pain and sore throat.    Eyes:  Negative for blurred vision and double vision.   Respiratory:  Negative for cough, sputum production, shortness of breath and wheezing.    Cardiovascular:  Negative for chest pain, palpitations and leg swelling.   Gastrointestinal:  Negative for abdominal pain, constipation, diarrhea, heartburn, nausea and vomiting.   Genitourinary: Negative.    Musculoskeletal: Negative.    Skin: Negative.  Negative for rash.   Neurological: Negative.    Endo/Heme/Allergies: Negative.  Negative for polydipsia. Does not bruise/bleed easily.   Psychiatric/Behavioral:  Negative for depression and substance abuse.    Physical Exam  Psychiatric:         Thought Content: Thought content normal.         Judgment: Judgment normal.       Laboratory Reviewed ({Yes)  Lab Results   Component Value Date    WBC 7.92 09/26/2022    HGB 14.6 09/26/2022    HCT 48.9 09/26/2022     09/26/2022    CHOL 204 (H) 09/26/2022    TRIG 137 09/26/2022    HDL 52 09/26/2022    ALT 16 03/13/2023    AST 12 03/13/2023     03/13/2023    K 4.8 03/13/2023     03/13/2023    CREATININE 1.1 03/13/2023    BUN 18 03/13/2023    CO2 29 03/13/2023    PSA 5.5 (H) 09/26/2022    HGBA1C 12.2 (H) 03/13/2023       Diagnoses and all orders for this visit:    Type 2 diabetes mellitus with hyperglycemia, without long-term current use of insulin  -     insulin glargine, TOUJEO, (TOUJEO SOLOSTAR U-300 INSULIN) 300 unit/mL (1.5 mL) InPn pen; Inject 10 Units into the skin once daily.    He will need pharmacy assistance, to see what  long acting insulin we can get him on.    Needs PA    I will send again and have staff call pharmacy to verify which basal insulin is covered in plan.                Care Plan/Goals: Reviewed    Goals    None         Follow up: No follow-ups on file.    After visit summary was printed and given to patient upon discharge today.  Patient goals and care plan are included in After Visit Summary.

## 2023-05-03 ENCOUNTER — HOSPITAL ENCOUNTER (OUTPATIENT)
Dept: PREADMISSION TESTING | Facility: HOSPITAL | Age: 68
Discharge: HOME | End: 2023-05-03
Attending: INTERNAL MEDICINE
Payer: MEDICARE

## 2023-05-03 DIAGNOSIS — Z12.11 SCREEN FOR COLON CANCER: ICD-10-CM

## 2023-06-28 DIAGNOSIS — E11.9 TYPE 2 DIABETES MELLITUS WITHOUT COMPLICATION: ICD-10-CM

## 2023-09-11 ENCOUNTER — HOSPITAL ENCOUNTER (OUTPATIENT)
Dept: RADIOLOGY | Facility: HOSPITAL | Age: 68
Discharge: HOME | End: 2023-09-11
Attending: FAMILY MEDICINE
Payer: MEDICARE

## 2023-09-11 ENCOUNTER — OFFICE VISIT (OUTPATIENT)
Dept: INTERNAL MEDICINE | Facility: CLINIC | Age: 68
End: 2023-09-11
Payer: MEDICARE

## 2023-09-11 VITALS
OXYGEN SATURATION: 100 % | WEIGHT: 183.63 LBS | HEIGHT: 70 IN | DIASTOLIC BLOOD PRESSURE: 78 MMHG | TEMPERATURE: 97 F | HEART RATE: 94 BPM | BODY MASS INDEX: 26.29 KG/M2 | RESPIRATION RATE: 19 BRPM | SYSTOLIC BLOOD PRESSURE: 132 MMHG

## 2023-09-11 DIAGNOSIS — M79.605 PAIN IN BOTH LOWER EXTREMITIES: ICD-10-CM

## 2023-09-11 DIAGNOSIS — E11.65 TYPE 2 DIABETES MELLITUS WITH HYPERGLYCEMIA, WITHOUT LONG-TERM CURRENT USE OF INSULIN: Primary | ICD-10-CM

## 2023-09-11 DIAGNOSIS — M79.604 PAIN IN BOTH LOWER EXTREMITIES: ICD-10-CM

## 2023-09-11 DIAGNOSIS — R97.20 ELEVATED PSA: ICD-10-CM

## 2023-09-11 DIAGNOSIS — Z91.199 NONCOMPLIANCE: ICD-10-CM

## 2023-09-11 DIAGNOSIS — E11.69 HYPERLIPIDEMIA ASSOCIATED WITH TYPE 2 DIABETES MELLITUS: ICD-10-CM

## 2023-09-11 DIAGNOSIS — E78.5 HYPERLIPIDEMIA ASSOCIATED WITH TYPE 2 DIABETES MELLITUS: ICD-10-CM

## 2023-09-11 PROCEDURE — 3046F PR MOST RECENT HEMOGLOBIN A1C LEVEL > 9.0%: ICD-10-PCS | Mod: HCNC,CPTII,S$GLB, | Performed by: FAMILY MEDICINE

## 2023-09-11 PROCEDURE — 99214 OFFICE O/P EST MOD 30 MIN: CPT | Mod: HCNC,S$GLB,, | Performed by: FAMILY MEDICINE

## 2023-09-11 PROCEDURE — 3008F BODY MASS INDEX DOCD: CPT | Mod: HCNC,CPTII,S$GLB, | Performed by: FAMILY MEDICINE

## 2023-09-11 PROCEDURE — 1159F MED LIST DOCD IN RCRD: CPT | Mod: HCNC,CPTII,S$GLB, | Performed by: FAMILY MEDICINE

## 2023-09-11 PROCEDURE — 1125F AMNT PAIN NOTED PAIN PRSNT: CPT | Mod: HCNC,CPTII,S$GLB, | Performed by: FAMILY MEDICINE

## 2023-09-11 PROCEDURE — 1159F PR MEDICATION LIST DOCUMENTED IN MEDICAL RECORD: ICD-10-PCS | Mod: HCNC,CPTII,S$GLB, | Performed by: FAMILY MEDICINE

## 2023-09-11 PROCEDURE — 3075F PR MOST RECENT SYSTOLIC BLOOD PRESS GE 130-139MM HG: ICD-10-PCS | Mod: HCNC,CPTII,S$GLB, | Performed by: FAMILY MEDICINE

## 2023-09-11 PROCEDURE — 3072F PR LOW RISK FOR RETINOPATHY: ICD-10-PCS | Mod: HCNC,CPTII,S$GLB, | Performed by: FAMILY MEDICINE

## 2023-09-11 PROCEDURE — 3075F SYST BP GE 130 - 139MM HG: CPT | Mod: HCNC,CPTII,S$GLB, | Performed by: FAMILY MEDICINE

## 2023-09-11 PROCEDURE — 3072F LOW RISK FOR RETINOPATHY: CPT | Mod: HCNC,CPTII,S$GLB, | Performed by: FAMILY MEDICINE

## 2023-09-11 PROCEDURE — 1160F RVW MEDS BY RX/DR IN RCRD: CPT | Mod: HCNC,CPTII,S$GLB, | Performed by: FAMILY MEDICINE

## 2023-09-11 PROCEDURE — 3078F PR MOST RECENT DIASTOLIC BLOOD PRESSURE < 80 MM HG: ICD-10-PCS | Mod: HCNC,CPTII,S$GLB, | Performed by: FAMILY MEDICINE

## 2023-09-11 PROCEDURE — 99999 PR PBB SHADOW E&M-EST. PATIENT-LVL III: CPT | Mod: PBBFAC,HCNC,, | Performed by: FAMILY MEDICINE

## 2023-09-11 PROCEDURE — 3046F HEMOGLOBIN A1C LEVEL >9.0%: CPT | Mod: HCNC,CPTII,S$GLB, | Performed by: FAMILY MEDICINE

## 2023-09-11 PROCEDURE — 99214 PR OFFICE/OUTPT VISIT, EST, LEVL IV, 30-39 MIN: ICD-10-PCS | Mod: HCNC,S$GLB,, | Performed by: FAMILY MEDICINE

## 2023-09-11 PROCEDURE — 99999 PR PBB SHADOW E&M-EST. PATIENT-LVL III: ICD-10-PCS | Mod: PBBFAC,HCNC,, | Performed by: FAMILY MEDICINE

## 2023-09-11 PROCEDURE — 3008F PR BODY MASS INDEX (BMI) DOCUMENTED: ICD-10-PCS | Mod: HCNC,CPTII,S$GLB, | Performed by: FAMILY MEDICINE

## 2023-09-11 PROCEDURE — 1101F PR PT FALLS ASSESS DOC 0-1 FALLS W/OUT INJ PAST YR: ICD-10-PCS | Mod: HCNC,CPTII,S$GLB, | Performed by: FAMILY MEDICINE

## 2023-09-11 PROCEDURE — 3078F DIAST BP <80 MM HG: CPT | Mod: HCNC,CPTII,S$GLB, | Performed by: FAMILY MEDICINE

## 2023-09-11 PROCEDURE — 1125F PR PAIN SEVERITY QUANTIFIED, PAIN PRESENT: ICD-10-PCS | Mod: HCNC,CPTII,S$GLB, | Performed by: FAMILY MEDICINE

## 2023-09-11 PROCEDURE — 3288F FALL RISK ASSESSMENT DOCD: CPT | Mod: HCNC,CPTII,S$GLB, | Performed by: FAMILY MEDICINE

## 2023-09-11 PROCEDURE — 1101F PT FALLS ASSESS-DOCD LE1/YR: CPT | Mod: HCNC,CPTII,S$GLB, | Performed by: FAMILY MEDICINE

## 2023-09-11 PROCEDURE — 3288F PR FALLS RISK ASSESSMENT DOCUMENTED: ICD-10-PCS | Mod: HCNC,CPTII,S$GLB, | Performed by: FAMILY MEDICINE

## 2023-09-11 PROCEDURE — 1160F PR REVIEW ALL MEDS BY PRESCRIBER/CLIN PHARMACIST DOCUMENTED: ICD-10-PCS | Mod: HCNC,CPTII,S$GLB, | Performed by: FAMILY MEDICINE

## 2023-09-11 RX ORDER — INSULIN DETEMIR 100 [IU]/ML
10 INJECTION, SOLUTION SUBCUTANEOUS NIGHTLY
Qty: 3 ML | Refills: 0 | Status: SHIPPED | OUTPATIENT
Start: 2023-09-11 | End: 2023-09-15 | Stop reason: SDUPTHER

## 2023-09-11 RX ORDER — MELOXICAM 7.5 MG/1
7.5 TABLET ORAL DAILY
Qty: 30 TABLET | Refills: 1 | Status: SHIPPED | OUTPATIENT
Start: 2023-09-11 | End: 2024-01-25 | Stop reason: SDUPTHER

## 2023-09-11 NOTE — PROGRESS NOTES
Tramaine Resendiz  09/11/2023  [id number]    Jeane Doyle MD  Patient Care Team:  Jeane Doyle MD as PCP - General (Family Medicine)  Sultana Ibarra NP as Nurse Practitioner (Internal Medicine)          Visit Type:an urgent visit for a new problem    Chief Complaint:  Chief Complaint   Patient presents with    Knee Pain     Bilateral        History of Present Illness:  67 year old  Leg pain    He has DM  Non compliance, with DM treatment.  He stopped the Glyburide,  he said insurance woudn't pay for the Toujeo.      He also has elevated PSA  Saw Urology   Refused Biopsy  Was told to return 3 month    Will need DM follow up and the PSA follow up    He is here for bilateral knee pain  Said that it does go into the calf  The knee does not swell.  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  He said when gets up he does stumble, hard to walk, but does get better when he gets going.  He does have cramping in his calves.      History:  Past Medical History:   Diagnosis Date    Hypertension     Type 2 diabetes mellitus without complications 2012    BS didn't check 11/28/2022     Past Surgical History:   Procedure Laterality Date    HERNIA REPAIR       Family History   Problem Relation Age of Onset    Hypertension Mother     Diabetes Mother      Social History     Socioeconomic History    Marital status:    Tobacco Use    Smoking status: Former     Types: Cigarettes    Smokeless tobacco: Never   Substance and Sexual Activity    Alcohol use: Yes     Patient Active Problem List   Diagnosis    Erectile dysfunction    Type 2 diabetes mellitus with hyperglycemia, without long-term current use of insulin     Review of patient's allergies indicates:  No Known Allergies    The following were reviewed at this visit: active problem list, medication list, allergies, family history, social history, and health maintenance.    Medications:  Current Outpatient Medications on File Prior to  Visit   Medication Sig Dispense Refill    blood sugar diagnostic Strp To check BG 1-2 times daily, to use with insurance preferred meter 100 strip 2    blood-glucose meter kit To check BG 1-2 times daily, to use with insurance preferred meter 1 each 0    lancets Misc To check BG 1-2 times daily, to use with insurance preferred meter 100 each 2    simvastatin (ZOCOR) 10 MG tablet Take 1 tablet (10 mg total) by mouth every evening. 90 tablet 3    glyBURIDE (DIABETA) 5 MG tablet Take 1 tablet (5 mg total) by mouth 2 (two) times daily with meals. (Patient not taking: Reported on 9/11/2023) 180 tablet 3    sildenafiL (VIAGRA) 100 MG tablet Take 1/2 to 1 tab by mouth 30 mins prior to sex (Patient not taking: Reported on 9/11/2023) 10 tablet 5    [DISCONTINUED] insulin glargine, TOUJEO, (TOUJEO SOLOSTAR U-300 INSULIN) 300 unit/mL (1.5 mL) InPn pen Inject 10 Units into the skin once daily. (Patient not taking: Reported on 9/11/2023) 1 mL 11     No current facility-administered medications on file prior to visit.       Medications have been reviewed and reconciled with patient at this visit.  Barriers to medications reviewed with patient.    Adverse reactions to current medications reviewed with patient..    Over the counter medications reviewed and reconciled with patient.    Exam:  Wt Readings from Last 3 Encounters:   09/11/23 83.3 kg (183 lb 10.3 oz)   03/23/23 86.6 kg (190 lb 14.7 oz)   03/16/23 86.7 kg (191 lb 4 oz)     Temp Readings from Last 3 Encounters:   09/11/23 96.5 °F (35.8 °C) (Tympanic)   03/23/23 96.2 °F (35.7 °C) (Tympanic)   03/16/23 97.1 °F (36.2 °C) (Oral)     BP Readings from Last 3 Encounters:   09/11/23 132/78   03/23/23 122/70   03/16/23 138/86     Pulse Readings from Last 3 Encounters:   09/11/23 94   03/23/23 100   03/16/23 99     Body mass index is 26.35 kg/m².      Review of Systems   Musculoskeletal:  Positive for joint pain.     Physical Exam  Nursing note reviewed.   Cardiovascular:      Rate  and Rhythm: Normal rate and regular rhythm.   Pulmonary:      Effort: Pulmonary effort is normal. No respiratory distress.   Neurological:      Mental Status: He is alert and oriented to person, place, and time.   Psychiatric:         Mood and Affect: Mood normal.         Behavior: Behavior normal.         Thought Content: Thought content normal.         Judgment: Judgment normal.         Laboratory Reviewed ({Yes)  Lab Results   Component Value Date    WBC 7.92 09/26/2022    HGB 14.6 09/26/2022    HCT 48.9 09/26/2022     09/26/2022    CHOL 204 (H) 09/26/2022    TRIG 137 09/26/2022    HDL 52 09/26/2022    ALT 16 03/13/2023    AST 12 03/13/2023     03/13/2023    K 4.8 03/13/2023     03/13/2023    CREATININE 1.1 03/13/2023    BUN 18 03/13/2023    CO2 29 03/13/2023    PSA 5.5 (H) 09/26/2022    HGBA1C 12.2 (H) 03/13/2023       Tramaine was seen today for knee pain.    Diagnoses and all orders for this visit:    Type 2 diabetes mellitus with hyperglycemia, without long-term current use of insulin  -     Hemoglobin A1C; Future  -     Microalbumin/Creatinine Ratio, Urine; Future    Hyperlipidemia associated with type 2 diabetes mellitus  -     Lipid Panel; Future    Noncompliance    Pain in both lower extremities  -     CV Ultrasound doppler arterial legs bilat; Future  -     Ankle Brachial Indices (MISHA); Future  -     X-Ray Knee 3 View Bilateral; Future    Elevated PSA  -     PROSTATE SPECIFIC ANTIGEN, DIAGNOSTIC; Future    Other orders  -     insulin detemir U-100, Levemir, (LEVEMIR FLEXPEN) 100 unit/mL (3 mL) InPn pen; Inject 10 Units into the skin every evening.  -     meloxicam (MOBIC) 7.5 MG tablet; Take 1 tablet (7.5 mg total) by mouth once daily.      Check the HgA1c, Start Levemir if the HgA1c is not at goal.  Take Glyburide.    [facility]    Xray knees  Mobic  Check MISHA and Arterial             Care Plan/Goals: Reviewed    Goals    None         Follow up: Follow up in about 3 months (around  12/11/2023).    After visit summary was printed and given to patient upon discharge today.  Patient goals and care plan are included in After Visit Summary.

## 2023-09-12 ENCOUNTER — HOSPITAL ENCOUNTER (OUTPATIENT)
Dept: CARDIOLOGY | Facility: HOSPITAL | Age: 68
Discharge: HOME | End: 2023-09-12
Attending: FAMILY MEDICINE
Payer: MEDICARE

## 2023-09-12 ENCOUNTER — HOSPITAL ENCOUNTER (OUTPATIENT)
Dept: RADIOLOGY | Facility: HOSPITAL | Age: 68
Discharge: HOME | End: 2023-09-12
Attending: FAMILY MEDICINE
Payer: MEDICARE

## 2023-09-12 VITALS
BODY MASS INDEX: 26.2 KG/M2 | SYSTOLIC BLOOD PRESSURE: 143 MMHG | WEIGHT: 183 LBS | DIASTOLIC BLOOD PRESSURE: 78 MMHG | HEIGHT: 70 IN | WEIGHT: 183 LBS | BODY MASS INDEX: 26.2 KG/M2 | DIASTOLIC BLOOD PRESSURE: 84 MMHG | SYSTOLIC BLOOD PRESSURE: 132 MMHG | HEIGHT: 70 IN

## 2023-09-12 DIAGNOSIS — M79.604 PAIN IN BOTH LOWER EXTREMITIES: ICD-10-CM

## 2023-09-12 DIAGNOSIS — M79.605 PAIN IN BOTH LOWER EXTREMITIES: ICD-10-CM

## 2023-09-12 LAB
LEFT ABI: 1.13
LEFT ANT TIBIAL SYS PSV: 99 CM/S
LEFT ARM BP: 161 MMHG
LEFT CFA PSV: 78 CM/S
LEFT DORSALIS PEDIS: 188 MMHG
LEFT PERONEAL SYS PSV: 49 CM/S
LEFT POPLITEAL PSV: 66 CM/S
LEFT POST TIBIAL SYS PSV: 76 CM/S
LEFT POSTERIOR TIBIAL: 189 MMHG
LEFT PROFUNDA SYS PSV: 71 CM/S
LEFT SUPER FEMORAL DIST SYS PSV: 65 CM/S
LEFT SUPER FEMORAL MID SYS PSV: 92 CM/S
LEFT SUPER FEMORAL OSTIAL SYS PSV: 57 CM/S
LEFT SUPER FEMORAL PROX SYS PSV: 97 CM/S
LEFT TBI: 0.93
LEFT TIB/PER TRUNK SYS PSV: 70 CM/S
LEFT TOE PRESSURE: 157 MMHG
OHS CV LEFT LOWER EXTREMITY ABI (NO CALC): 1.13
OHS CV RIGHT ABI LOWER EXTREMITY (NO CALC): 1.27
RIGHT ABI: 1.27
RIGHT ANT TIBIAL SYS PSV: 74 CM/S
RIGHT ARM BP: 168 MMHG
RIGHT CFA PSV: 90 CM/S
RIGHT DORSALIS PEDIS: 201 MMHG
RIGHT PERONEAL SYS PSV: 68 CM/S
RIGHT POPLITEAL PSV: 50 CM/S
RIGHT POST TIBIAL SYS PSV: 47 CM/S
RIGHT POSTERIOR TIBIAL: 213 MMHG
RIGHT PROFUNDA SYS PSV: 80 CM/S
RIGHT SUPER FEMORAL DIST SYS PSV: 72 CM/S
RIGHT SUPER FEMORAL MID SYS PSV: 91 CM/S
RIGHT SUPER FEMORAL OSTIAL SYS PSV: 73 CM/S
RIGHT SUPER FEMORAL PROX SYS PSV: 94 CM/S
RIGHT TBI: 0.77
RIGHT TIB/PER TRUNK SYS PSV: 65 CM/S
RIGHT TOE PRESSURE: 130 MMHG

## 2023-09-12 PROCEDURE — 73562 X-RAY EXAM OF KNEE 3: CPT | Mod: TC,50,HCNC

## 2023-09-12 PROCEDURE — 93922 UPR/L XTREMITY ART 2 LEVELS: CPT | Mod: HCNC

## 2023-09-12 PROCEDURE — 73562 XR KNEE ORTHO BILAT: ICD-10-PCS | Mod: 26,50,HCNC, | Performed by: RADIOLOGY

## 2023-09-12 PROCEDURE — 93925 LOWER EXTREMITY STUDY: CPT | Mod: 26,HCNC,, | Performed by: INTERNAL MEDICINE

## 2023-09-12 PROCEDURE — 93925 LOWER EXTREMITY STUDY: CPT | Mod: HCNC

## 2023-09-12 PROCEDURE — 73562 X-RAY EXAM OF KNEE 3: CPT | Mod: 26,50,HCNC, | Performed by: RADIOLOGY

## 2023-09-12 PROCEDURE — 93922 ANKLE BRACHIAL INDICES (ABI): ICD-10-PCS | Mod: 26,HCNC,, | Performed by: INTERNAL MEDICINE

## 2023-09-12 PROCEDURE — 93922 UPR/L XTREMITY ART 2 LEVELS: CPT | Mod: 26,HCNC,, | Performed by: INTERNAL MEDICINE

## 2023-09-12 PROCEDURE — 93925 CV US DOPPLER ARTERIAL LEGS BILATERAL (CUPID ONLY): ICD-10-PCS | Mod: 26,HCNC,, | Performed by: INTERNAL MEDICINE

## 2023-09-15 DIAGNOSIS — M79.604 PAIN IN BOTH LOWER EXTREMITIES: Primary | ICD-10-CM

## 2023-09-15 DIAGNOSIS — E78.5 HYPERLIPIDEMIA ASSOCIATED WITH TYPE 2 DIABETES MELLITUS: ICD-10-CM

## 2023-09-15 DIAGNOSIS — M79.605 PAIN IN BOTH LOWER EXTREMITIES: Primary | ICD-10-CM

## 2023-09-15 DIAGNOSIS — E11.69 TYPE 2 DIABETES MELLITUS WITH OTHER SPECIFIED COMPLICATION, UNSPECIFIED WHETHER LONG TERM INSULIN USE: ICD-10-CM

## 2023-09-15 DIAGNOSIS — E11.69 HYPERLIPIDEMIA ASSOCIATED WITH TYPE 2 DIABETES MELLITUS: ICD-10-CM

## 2023-09-15 RX ORDER — INSULIN DETEMIR 100 [IU]/ML
10 INJECTION, SOLUTION SUBCUTANEOUS NIGHTLY
Qty: 3 ML | Refills: 0 | Status: SHIPPED | OUTPATIENT
Start: 2023-09-15 | End: 2024-01-25 | Stop reason: SDUPTHER

## 2023-09-15 RX ORDER — GLYBURIDE 5 MG/1
5 TABLET ORAL 2 TIMES DAILY WITH MEALS
Qty: 180 TABLET | Refills: 3 | Status: ON HOLD | OUTPATIENT
Start: 2023-09-15 | End: 2024-02-12

## 2023-09-15 RX ORDER — SIMVASTATIN 10 MG/1
10 TABLET, FILM COATED ORAL NIGHTLY
Qty: 90 TABLET | Refills: 3 | Status: ON HOLD | OUTPATIENT
Start: 2023-09-15 | End: 2024-02-15 | Stop reason: HOSPADM

## 2023-09-28 ENCOUNTER — OFFICE VISIT (OUTPATIENT)
Dept: UROLOGY | Facility: CLINIC | Age: 68
End: 2023-09-28
Payer: MEDICARE

## 2023-09-28 VITALS
BODY MASS INDEX: 26.86 KG/M2 | HEART RATE: 89 BPM | WEIGHT: 187.19 LBS | SYSTOLIC BLOOD PRESSURE: 143 MMHG | DIASTOLIC BLOOD PRESSURE: 93 MMHG

## 2023-09-28 DIAGNOSIS — R97.20 ELEVATED PSA: Primary | ICD-10-CM

## 2023-09-28 DIAGNOSIS — N52.9 ERECTILE DYSFUNCTION, UNSPECIFIED ERECTILE DYSFUNCTION TYPE: ICD-10-CM

## 2023-09-28 PROCEDURE — 1159F MED LIST DOCD IN RCRD: CPT | Mod: HCNC,CPTII,S$GLB, | Performed by: UROLOGY

## 2023-09-28 PROCEDURE — 3066F NEPHROPATHY DOC TX: CPT | Mod: HCNC,CPTII,S$GLB, | Performed by: UROLOGY

## 2023-09-28 PROCEDURE — 1125F PR PAIN SEVERITY QUANTIFIED, PAIN PRESENT: ICD-10-PCS | Mod: HCNC,CPTII,S$GLB, | Performed by: UROLOGY

## 2023-09-28 PROCEDURE — 3077F SYST BP >= 140 MM HG: CPT | Mod: HCNC,CPTII,S$GLB, | Performed by: UROLOGY

## 2023-09-28 PROCEDURE — 3008F PR BODY MASS INDEX (BMI) DOCUMENTED: ICD-10-PCS | Mod: HCNC,CPTII,S$GLB, | Performed by: UROLOGY

## 2023-09-28 PROCEDURE — 3046F HEMOGLOBIN A1C LEVEL >9.0%: CPT | Mod: HCNC,CPTII,S$GLB, | Performed by: UROLOGY

## 2023-09-28 PROCEDURE — 99999 PR PBB SHADOW E&M-EST. PATIENT-LVL III: ICD-10-PCS | Mod: PBBFAC,HCNC,, | Performed by: UROLOGY

## 2023-09-28 PROCEDURE — 3060F PR POS MICROALBUMINURIA RESULT DOCUMENTED/REVIEW: ICD-10-PCS | Mod: HCNC,CPTII,S$GLB, | Performed by: UROLOGY

## 2023-09-28 PROCEDURE — 3060F POS MICROALBUMINURIA REV: CPT | Mod: HCNC,CPTII,S$GLB, | Performed by: UROLOGY

## 2023-09-28 PROCEDURE — 1101F PT FALLS ASSESS-DOCD LE1/YR: CPT | Mod: HCNC,CPTII,S$GLB, | Performed by: UROLOGY

## 2023-09-28 PROCEDURE — 3080F PR MOST RECENT DIASTOLIC BLOOD PRESSURE >= 90 MM HG: ICD-10-PCS | Mod: HCNC,CPTII,S$GLB, | Performed by: UROLOGY

## 2023-09-28 PROCEDURE — 3072F PR LOW RISK FOR RETINOPATHY: ICD-10-PCS | Mod: HCNC,CPTII,S$GLB, | Performed by: UROLOGY

## 2023-09-28 PROCEDURE — 3080F DIAST BP >= 90 MM HG: CPT | Mod: HCNC,CPTII,S$GLB, | Performed by: UROLOGY

## 2023-09-28 PROCEDURE — 3066F PR DOCUMENTATION OF TREATMENT FOR NEPHROPATHY: ICD-10-PCS | Mod: HCNC,CPTII,S$GLB, | Performed by: UROLOGY

## 2023-09-28 PROCEDURE — 99999 PR PBB SHADOW E&M-EST. PATIENT-LVL III: CPT | Mod: PBBFAC,HCNC,, | Performed by: UROLOGY

## 2023-09-28 PROCEDURE — 99214 PR OFFICE/OUTPT VISIT, EST, LEVL IV, 30-39 MIN: ICD-10-PCS | Mod: HCNC,S$GLB,, | Performed by: UROLOGY

## 2023-09-28 PROCEDURE — 3008F BODY MASS INDEX DOCD: CPT | Mod: HCNC,CPTII,S$GLB, | Performed by: UROLOGY

## 2023-09-28 PROCEDURE — 3077F PR MOST RECENT SYSTOLIC BLOOD PRESSURE >= 140 MM HG: ICD-10-PCS | Mod: HCNC,CPTII,S$GLB, | Performed by: UROLOGY

## 2023-09-28 PROCEDURE — 3288F PR FALLS RISK ASSESSMENT DOCUMENTED: ICD-10-PCS | Mod: HCNC,CPTII,S$GLB, | Performed by: UROLOGY

## 2023-09-28 PROCEDURE — 1159F PR MEDICATION LIST DOCUMENTED IN MEDICAL RECORD: ICD-10-PCS | Mod: HCNC,CPTII,S$GLB, | Performed by: UROLOGY

## 2023-09-28 PROCEDURE — 99214 OFFICE O/P EST MOD 30 MIN: CPT | Mod: HCNC,S$GLB,, | Performed by: UROLOGY

## 2023-09-28 PROCEDURE — 1160F RVW MEDS BY RX/DR IN RCRD: CPT | Mod: HCNC,CPTII,S$GLB, | Performed by: UROLOGY

## 2023-09-28 PROCEDURE — 3046F PR MOST RECENT HEMOGLOBIN A1C LEVEL > 9.0%: ICD-10-PCS | Mod: HCNC,CPTII,S$GLB, | Performed by: UROLOGY

## 2023-09-28 PROCEDURE — 1125F AMNT PAIN NOTED PAIN PRSNT: CPT | Mod: HCNC,CPTII,S$GLB, | Performed by: UROLOGY

## 2023-09-28 PROCEDURE — 1160F PR REVIEW ALL MEDS BY PRESCRIBER/CLIN PHARMACIST DOCUMENTED: ICD-10-PCS | Mod: HCNC,CPTII,S$GLB, | Performed by: UROLOGY

## 2023-09-28 PROCEDURE — 3288F FALL RISK ASSESSMENT DOCD: CPT | Mod: HCNC,CPTII,S$GLB, | Performed by: UROLOGY

## 2023-09-28 PROCEDURE — 3072F LOW RISK FOR RETINOPATHY: CPT | Mod: HCNC,CPTII,S$GLB, | Performed by: UROLOGY

## 2023-09-28 PROCEDURE — 1101F PR PT FALLS ASSESS DOC 0-1 FALLS W/OUT INJ PAST YR: ICD-10-PCS | Mod: HCNC,CPTII,S$GLB, | Performed by: UROLOGY

## 2023-09-28 RX ORDER — TADALAFIL 20 MG/1
20 TABLET ORAL DAILY
Qty: 30 TABLET | Refills: 11 | Status: ON HOLD | OUTPATIENT
Start: 2023-09-28 | End: 2024-02-12

## 2023-09-28 NOTE — PROGRESS NOTES
Chief Complaint:   Elevated PSA    HPI:   09/28/2023 - patient returns today for follow-up, unfortunately he lost his mom the beginning of this month, still getting over it, no new voiding issues, Viagra not very helpful for his ED, wants to try something else, no gross hematuria or dysuria    03/16/2023 - patient returns today for follow-up and review of his MRI, this shows a 45 g prostate without suspicious lesions, patient also notes issues with ED, has never tried anything for his ED, does not take any nitrates, voiding well no gross hematuria, notes that his 91 yo mom has dementia and just recently broke her hip so he is busy taking care of her    Patient is a 67-year-old male that is presenting with an elevated PSA, 5.5.  Patient denies gross hematuria, no BPH meds.  Denies nocturia and stream is strong during the day.  Urine in clinic is negative and PVR was 45 mL.  No prior prostate biopsies.  No  cancers in his family.No urolithiasis.  No urinary bother.    Allergies:  Patient has no known allergies.    Medications:  has a current medication list which includes the following prescription(s): blood sugar diagnostic, blood-glucose meter, glyburide, levemir flexpen, lancets, meloxicam, sildenafil, and simvastatin.    Review of Systems:  General: No fever, chills  Skin: No rashes  Chest:  Denies cough and sputum production  Heart: Denies chest pain  Resp: Denies dyspnea  Abdomen: Denies diarrhea, abdominal pain, hematemesis, or blood in stool.  Musculoskeletal: No joint stiffness or swelling. Denies back pain.  : see HPI  Neuro: no dizziness or weakness      PMH:   has a past medical history of Hypertension and Type 2 diabetes mellitus without complications (2012).    PSH:   has a past surgical history that includes Hernia repair.    FamHx: family history includes Diabetes in his mother; Hypertension in his mother.    SocHx:  reports that he has quit smoking. His smoking use included cigarettes. He has  never used smokeless tobacco. He reports current alcohol use. No history on file for drug use.      Physical Exam:  General: A&Ox3, no apparent distress, no deformities  Neck: No masses, normal thyroid  Lungs: normal inspiration, no use of accessory muscles  Heart: normal pulse, no arrhythmias  Abdomen: Soft, NT, ND, no masses, no hernias, no hepatosplenomegaly  Lymphatic: Neck and groin nodes negative  Skin: The skin is warm and dry. No jaundice.  Ext: No c/c/e.  : Test desc ap, no abnormalities of epididymus. Penis uncircumcised, with normal penile and scrotal skin. Meatus normal. Normal rectal tone, no hemorrhoids. Prost 40 gm no nodules or masses appreciated. SV not palpable. Perineum and anus normal.    MRI PROSTATE W W/O CONTRAST 01/17/2023     CLINICAL HISTORY:  prostate cancer;  Elevated prostate specific antigen (PSA)     TECHNIQUE:  Multiparametric MR imaging of the prostate gland is performed with and without the intravenous administration of 10 cc of Gadavist.     3D reconstructions: 3D reconstructions were ordered by the urologist to generate a 3D model of the prostate gland for subsequent target lesion mapping as needed for direct or fusion prostate biopsy.  I, the interpreting radiologist, performed the 3D reconstructions on [employer], an independent workstation, with reports and key images saved to PACS.     COMPARISON:  None     FINDINGS:  The prostate gland measures 5.8 x 3.9 x 4.0 cm for total volume of 45.72 cc.  The T1 sequence shows no intra prostatic hemorrhage.     The transitional zone contains hyperplastic nodules.  No suspicious transitional zone lesion.     The peripheral zone shows no diffusion restriction or suspicious T2 hypointense nodule.     Prostate capsule intact.  Rectoprostatic angle preserved and the neurovascular bundles have a normal appearance.  Normal seminal vesicles.  No pelvic lymphadenopathy.  No focal bone marrow replacing lesion in the bony pelvis.     Impression:      1. No focal suspicious lesion in the prostate gland to localize a clinically significant prostate cancer.  2. Total prostate volume 45.72 cc.  3. No pelvic lymphadenopathy.  PIRADS 2 - Low (clinically significant cancer unlikely to be present. )    Labs/Studies:    Latest Reference Range & Units 09/26/22 10:29   PSA, Screen 0.00 - 4.00 ng/mL 5.5 (H)   (H): Data is abnormally high    Impression/Plan:   Elevated PSA - plan on TRUS bx at his next visit after 12/5    ED - viagra not helpful, RX for cialis sent    Howard Baum MD

## 2023-10-04 ENCOUNTER — OFFICE VISIT (OUTPATIENT)
Dept: INTERNAL MEDICINE | Facility: CLINIC | Age: 68
End: 2023-10-04
Payer: MEDICARE

## 2023-10-04 ENCOUNTER — LAB VISIT (OUTPATIENT)
Dept: LAB | Facility: HOSPITAL | Age: 68
End: 2023-10-04
Payer: MEDICARE

## 2023-10-04 VITALS
HEIGHT: 70 IN | DIASTOLIC BLOOD PRESSURE: 70 MMHG | SYSTOLIC BLOOD PRESSURE: 110 MMHG | RESPIRATION RATE: 16 BRPM | BODY MASS INDEX: 26.7 KG/M2 | TEMPERATURE: 96 F | WEIGHT: 186.5 LBS | HEART RATE: 80 BPM

## 2023-10-04 DIAGNOSIS — M79.661 BILATERAL CALF PAIN: ICD-10-CM

## 2023-10-04 DIAGNOSIS — M79.662 BILATERAL CALF PAIN: ICD-10-CM

## 2023-10-04 DIAGNOSIS — E11.65 TYPE 2 DIABETES MELLITUS WITH HYPERGLYCEMIA, WITHOUT LONG-TERM CURRENT USE OF INSULIN: ICD-10-CM

## 2023-10-04 DIAGNOSIS — M79.604 PAIN IN BOTH LOWER EXTREMITIES: ICD-10-CM

## 2023-10-04 DIAGNOSIS — R26.89 OTHER ABNORMALITIES OF GAIT AND MOBILITY: ICD-10-CM

## 2023-10-04 DIAGNOSIS — M79.604 PAIN IN BOTH LOWER EXTREMITIES: Primary | ICD-10-CM

## 2023-10-04 DIAGNOSIS — M79.605 PAIN IN BOTH LOWER EXTREMITIES: ICD-10-CM

## 2023-10-04 DIAGNOSIS — Z59.9 FINANCIAL DIFFICULTIES: ICD-10-CM

## 2023-10-04 DIAGNOSIS — M25.561 PAIN IN BOTH KNEES, UNSPECIFIED CHRONICITY: Primary | ICD-10-CM

## 2023-10-04 DIAGNOSIS — M25.562 PAIN IN BOTH KNEES, UNSPECIFIED CHRONICITY: Primary | ICD-10-CM

## 2023-10-04 DIAGNOSIS — M79.605 PAIN IN BOTH LOWER EXTREMITIES: Primary | ICD-10-CM

## 2023-10-04 LAB
ALBUMIN SERPL BCP-MCNC: 3.9 G/DL (ref 3.5–5.2)
ALP SERPL-CCNC: 103 U/L (ref 55–135)
ALT SERPL W/O P-5'-P-CCNC: 16 U/L (ref 10–44)
ANION GAP SERPL CALC-SCNC: 9 MMOL/L (ref 8–16)
AST SERPL-CCNC: 15 U/L (ref 10–40)
BASOPHILS # BLD AUTO: 0.01 K/UL (ref 0–0.2)
BASOPHILS NFR BLD: 0.1 % (ref 0–1.9)
BILIRUB SERPL-MCNC: 0.3 MG/DL (ref 0.1–1)
BUN SERPL-MCNC: 14 MG/DL (ref 8–23)
CALCIUM SERPL-MCNC: 9.6 MG/DL (ref 8.7–10.5)
CHLORIDE SERPL-SCNC: 104 MMOL/L (ref 95–110)
CK SERPL-CCNC: 154 U/L (ref 20–200)
CO2 SERPL-SCNC: 26 MMOL/L (ref 23–29)
CREAT SERPL-MCNC: 0.9 MG/DL (ref 0.5–1.4)
DIFFERENTIAL METHOD: ABNORMAL
EOSINOPHIL # BLD AUTO: 0.1 K/UL (ref 0–0.5)
EOSINOPHIL NFR BLD: 1.3 % (ref 0–8)
ERYTHROCYTE [DISTWIDTH] IN BLOOD BY AUTOMATED COUNT: 14.4 % (ref 11.5–14.5)
EST. GFR  (NO RACE VARIABLE): >60 ML/MIN/1.73 M^2
GLUCOSE SERPL-MCNC: 260 MG/DL (ref 70–110)
HCT VFR BLD AUTO: 44.5 % (ref 40–54)
HGB BLD-MCNC: 13.9 G/DL (ref 14–18)
IMM GRANULOCYTES # BLD AUTO: 0.01 K/UL (ref 0–0.04)
IMM GRANULOCYTES NFR BLD AUTO: 0.1 % (ref 0–0.5)
LYMPHOCYTES # BLD AUTO: 2.3 K/UL (ref 1–4.8)
LYMPHOCYTES NFR BLD: 28.1 % (ref 18–48)
MAGNESIUM SERPL-MCNC: 1.9 MG/DL (ref 1.6–2.6)
MCH RBC QN AUTO: 25.4 PG (ref 27–31)
MCHC RBC AUTO-ENTMCNC: 31.2 G/DL (ref 32–36)
MCV RBC AUTO: 81 FL (ref 82–98)
MONOCYTES # BLD AUTO: 1 K/UL (ref 0.3–1)
MONOCYTES NFR BLD: 11.7 % (ref 4–15)
NEUTROPHILS # BLD AUTO: 4.8 K/UL (ref 1.8–7.7)
NEUTROPHILS NFR BLD: 58.7 % (ref 38–73)
NRBC BLD-RTO: 0 /100 WBC
PLATELET # BLD AUTO: 282 K/UL (ref 150–450)
PMV BLD AUTO: 10.9 FL (ref 9.2–12.9)
POTASSIUM SERPL-SCNC: 4.4 MMOL/L (ref 3.5–5.1)
PROT SERPL-MCNC: 7.7 G/DL (ref 6–8.4)
RBC # BLD AUTO: 5.48 M/UL (ref 4.6–6.2)
SODIUM SERPL-SCNC: 139 MMOL/L (ref 136–145)
VIT B12 SERPL-MCNC: 822 PG/ML (ref 210–950)
WBC # BLD AUTO: 8.19 K/UL (ref 3.9–12.7)

## 2023-10-04 PROCEDURE — 1101F PT FALLS ASSESS-DOCD LE1/YR: CPT | Mod: HCNC,CPTII,S$GLB,

## 2023-10-04 PROCEDURE — G0008 FLU VACCINE - QUADRIVALENT - ADJUVANTED: ICD-10-PCS | Mod: HCNC,S$GLB,,

## 2023-10-04 PROCEDURE — 3066F NEPHROPATHY DOC TX: CPT | Mod: HCNC,CPTII,S$GLB,

## 2023-10-04 PROCEDURE — 90694 FLU VACCINE - QUADRIVALENT - ADJUVANTED: ICD-10-PCS | Mod: HCNC,S$GLB,,

## 2023-10-04 PROCEDURE — 3008F BODY MASS INDEX DOCD: CPT | Mod: HCNC,CPTII,S$GLB,

## 2023-10-04 PROCEDURE — 82607 VITAMIN B-12: CPT | Mod: HCNC

## 2023-10-04 PROCEDURE — 85025 COMPLETE CBC W/AUTO DIFF WBC: CPT | Mod: HCNC

## 2023-10-04 PROCEDURE — 90694 VACC AIIV4 NO PRSRV 0.5ML IM: CPT | Mod: HCNC,S$GLB,,

## 2023-10-04 PROCEDURE — 3072F LOW RISK FOR RETINOPATHY: CPT | Mod: HCNC,CPTII,S$GLB,

## 2023-10-04 PROCEDURE — 82550 ASSAY OF CK (CPK): CPT | Mod: HCNC

## 2023-10-04 PROCEDURE — 3288F PR FALLS RISK ASSESSMENT DOCUMENTED: ICD-10-PCS | Mod: HCNC,CPTII,S$GLB,

## 2023-10-04 PROCEDURE — 3060F PR POS MICROALBUMINURIA RESULT DOCUMENTED/REVIEW: ICD-10-PCS | Mod: HCNC,CPTII,S$GLB,

## 2023-10-04 PROCEDURE — 80053 COMPREHEN METABOLIC PANEL: CPT | Mod: HCNC

## 2023-10-04 PROCEDURE — 36415 COLL VENOUS BLD VENIPUNCTURE: CPT | Mod: HCNC

## 2023-10-04 PROCEDURE — 3046F PR MOST RECENT HEMOGLOBIN A1C LEVEL > 9.0%: ICD-10-PCS | Mod: HCNC,CPTII,S$GLB,

## 2023-10-04 PROCEDURE — 3066F PR DOCUMENTATION OF TREATMENT FOR NEPHROPATHY: ICD-10-PCS | Mod: HCNC,CPTII,S$GLB,

## 2023-10-04 PROCEDURE — 3288F FALL RISK ASSESSMENT DOCD: CPT | Mod: HCNC,CPTII,S$GLB,

## 2023-10-04 PROCEDURE — 3074F PR MOST RECENT SYSTOLIC BLOOD PRESSURE < 130 MM HG: ICD-10-PCS | Mod: HCNC,CPTII,S$GLB,

## 2023-10-04 PROCEDURE — 1101F PR PT FALLS ASSESS DOC 0-1 FALLS W/OUT INJ PAST YR: ICD-10-PCS | Mod: HCNC,CPTII,S$GLB,

## 2023-10-04 PROCEDURE — 3078F DIAST BP <80 MM HG: CPT | Mod: HCNC,CPTII,S$GLB,

## 2023-10-04 PROCEDURE — 3072F PR LOW RISK FOR RETINOPATHY: ICD-10-PCS | Mod: HCNC,CPTII,S$GLB,

## 2023-10-04 PROCEDURE — 3078F PR MOST RECENT DIASTOLIC BLOOD PRESSURE < 80 MM HG: ICD-10-PCS | Mod: HCNC,CPTII,S$GLB,

## 2023-10-04 PROCEDURE — 3060F POS MICROALBUMINURIA REV: CPT | Mod: HCNC,CPTII,S$GLB,

## 2023-10-04 PROCEDURE — 99999 PR PBB SHADOW E&M-EST. PATIENT-LVL IV: CPT | Mod: PBBFAC,HCNC,,

## 2023-10-04 PROCEDURE — 1159F PR MEDICATION LIST DOCUMENTED IN MEDICAL RECORD: ICD-10-PCS | Mod: HCNC,CPTII,S$GLB,

## 2023-10-04 PROCEDURE — 3046F HEMOGLOBIN A1C LEVEL >9.0%: CPT | Mod: HCNC,CPTII,S$GLB,

## 2023-10-04 PROCEDURE — 99214 PR OFFICE/OUTPT VISIT, EST, LEVL IV, 30-39 MIN: ICD-10-PCS | Mod: 25,HCNC,S$GLB,

## 2023-10-04 PROCEDURE — 1160F RVW MEDS BY RX/DR IN RCRD: CPT | Mod: HCNC,CPTII,S$GLB,

## 2023-10-04 PROCEDURE — 99999 PR PBB SHADOW E&M-EST. PATIENT-LVL IV: ICD-10-PCS | Mod: PBBFAC,HCNC,,

## 2023-10-04 PROCEDURE — 3074F SYST BP LT 130 MM HG: CPT | Mod: HCNC,CPTII,S$GLB,

## 2023-10-04 PROCEDURE — 83735 ASSAY OF MAGNESIUM: CPT | Mod: HCNC

## 2023-10-04 PROCEDURE — 1160F PR REVIEW ALL MEDS BY PRESCRIBER/CLIN PHARMACIST DOCUMENTED: ICD-10-PCS | Mod: HCNC,CPTII,S$GLB,

## 2023-10-04 PROCEDURE — 1159F MED LIST DOCD IN RCRD: CPT | Mod: HCNC,CPTII,S$GLB,

## 2023-10-04 PROCEDURE — 1125F AMNT PAIN NOTED PAIN PRSNT: CPT | Mod: HCNC,CPTII,S$GLB,

## 2023-10-04 PROCEDURE — G0008 ADMIN INFLUENZA VIRUS VAC: HCPCS | Mod: HCNC,S$GLB,,

## 2023-10-04 PROCEDURE — 99214 OFFICE O/P EST MOD 30 MIN: CPT | Mod: 25,HCNC,S$GLB,

## 2023-10-04 PROCEDURE — 3008F PR BODY MASS INDEX (BMI) DOCUMENTED: ICD-10-PCS | Mod: HCNC,CPTII,S$GLB,

## 2023-10-04 PROCEDURE — 1125F PR PAIN SEVERITY QUANTIFIED, PAIN PRESENT: ICD-10-PCS | Mod: HCNC,CPTII,S$GLB,

## 2023-10-04 SDOH — SOCIAL DETERMINANTS OF HEALTH (SDOH): PROBLEM RELATED TO HOUSING AND ECONOMIC CIRCUMSTANCES, UNSPECIFIED: Z59.9

## 2023-10-04 NOTE — PROGRESS NOTES
Tramaine Resendiz  10/04/2023  [id number]    Jeane Doyle MD  Patient Care Team:  Jeane Doyle MD as PCP - General (Family Medicine)  Sultana Ibarra NP as Nurse Practitioner (Internal Medicine)          Visit Type:an urgent visit for an existing problem     Chief Complaint:  Calf Pain     History of Present Illness:    Pt was seen last month for BLE pain  Xray knee showed arthritis   He was referred to ortho for knee pain     Had MISHA and CV doppler bilateral legs   Normal MISHA, no sign of lack of blood flow to the legs    He is having pain in  bilateral calves  Massager offers min relief  Sitting for long periods cause him to get stiff and have trouble with ambulation      History:  Past Medical History:   Diagnosis Date    Hypertension     Type 2 diabetes mellitus without complications 2012    BS didn't check 11/28/2022     Past Surgical History:   Procedure Laterality Date    HERNIA REPAIR       Family History   Problem Relation Age of Onset    Hypertension Mother     Diabetes Mother      Social History     Socioeconomic History    Marital status:    Tobacco Use    Smoking status: Former     Types: Cigarettes    Smokeless tobacco: Never   Substance and Sexual Activity    Alcohol use: Yes     Patient Active Problem List   Diagnosis    Erectile dysfunction    Type 2 diabetes mellitus with hyperglycemia, without long-term current use of insulin     Review of patient's allergies indicates:  No Known Allergies    The following were reviewed at this visit: active problem list, medication list, allergies, family history, social history, and health maintenance.    Medications:  Current Outpatient Medications on File Prior to Visit   Medication Sig Dispense Refill    blood sugar diagnostic Strp To check BG 1-2 times daily, to use with insurance preferred meter 100 strip 2    blood-glucose meter kit To check BG 1-2 times daily, to use with insurance preferred meter 1 each 0    glyBURIDE (DIABETA) 5 MG  tablet Take 1 tablet (5 mg total) by mouth 2 (two) times daily with meals. 180 tablet 3    insulin detemir U-100, Levemir, (LEVEMIR FLEXPEN) 100 unit/mL (3 mL) InPn pen Inject 10 Units into the skin every evening. 3 mL 0    lancets Misc To check BG 1-2 times daily, to use with insurance preferred meter 100 each 2    meloxicam (MOBIC) 7.5 MG tablet Take 1 tablet (7.5 mg total) by mouth once daily. 30 tablet 1    sildenafiL (VIAGRA) 100 MG tablet Take 1/2 to 1 tab by mouth 30 mins prior to sex 10 tablet 5    simvastatin (ZOCOR) 10 MG tablet Take 1 tablet (10 mg total) by mouth every evening. 90 tablet 3    tadalafiL (CIALIS) 20 MG Tab Take 1 tablet (20 mg total) by mouth once daily. 30 tablet 11     No current facility-administered medications on file prior to visit.       Medications have been reviewed and reconciled with patient at this visit.  Barriers to medications reviewed with patient.    Adverse reactions to current medications reviewed with patient..    Over the counter medications reviewed and reconciled with patient.    Exam:  Wt Readings from Last 3 Encounters:   09/28/23 84.9 kg (187 lb 2.7 oz)   09/12/23 83 kg (183 lb)   09/12/23 83 kg (183 lb)     Temp Readings from Last 3 Encounters:   09/11/23 96.5 °F (35.8 °C) (Tympanic)   03/23/23 96.2 °F (35.7 °C) (Tympanic)   03/16/23 97.1 °F (36.2 °C) (Oral)     BP Readings from Last 3 Encounters:   09/28/23 (!) 143/93   09/12/23 132/78   09/12/23 (!) 143/84     Pulse Readings from Last 3 Encounters:   09/28/23 89   09/11/23 94   03/23/23 100     There is no height or weight on file to calculate BMI.      Review of Systems   Musculoskeletal:  Positive for joint pain and myalgias.   Neurological:  Positive for weakness.     Physical Exam  Vitals and nursing note reviewed.   Constitutional:       General: He is not in acute distress.     Appearance: He is well-developed. He is not diaphoretic.   HENT:      Head: Normocephalic and atraumatic.      Right Ear:  External ear normal.      Left Ear: External ear normal.   Eyes:      General:         Right eye: No discharge.         Left eye: No discharge.      Conjunctiva/sclera: Conjunctivae normal.      Pupils: Pupils are equal, round, and reactive to light.   Cardiovascular:      Rate and Rhythm: Normal rate and regular rhythm.      Pulses:           Dorsalis pedis pulses are 2+ on the right side and 2+ on the left side.      Heart sounds: Normal heart sounds. No murmur heard.  Pulmonary:      Effort: Pulmonary effort is normal. No respiratory distress.      Breath sounds: Normal breath sounds. No wheezing.   Musculoskeletal:         General: Tenderness present. No swelling.      Right knee: Decreased range of motion. Tenderness present.      Left knee: Decreased range of motion. Tenderness present.      Right lower leg: No swelling.      Left lower leg: No swelling.      Comments: Negative radha sign    Neurological:      Mental Status: He is alert and oriented to person, place, and time.      Gait: Gait abnormal.   Psychiatric:         Behavior: Behavior normal.         Thought Content: Thought content normal.         Judgment: Judgment normal.         Laboratory Reviewed ({Yes)  Lab Results   Component Value Date    WBC 7.92 09/26/2022    HGB 14.6 09/26/2022    HCT 48.9 09/26/2022     09/26/2022    CHOL 166 09/11/2023    TRIG 95 09/11/2023    HDL 48 09/11/2023    ALT 16 03/13/2023    AST 12 03/13/2023     03/13/2023    K 4.8 03/13/2023     03/13/2023    CREATININE 1.1 03/13/2023    BUN 18 03/13/2023    CO2 29 03/13/2023    PSA 5.5 (H) 09/26/2022    HGBA1C 12.9 (H) 09/11/2023       Tramaine was seen today for leg pain and weight loss.    Diagnoses and all orders for this visit:    Pain in both lower extremities  -     CK; Future  -     Magnesium; Future  -     COMPREHENSIVE METABOLIC PANEL; Future  -     CBC Auto Differential; Future  -     VITAMIN B12; Future    Bilateral calf pain  -     CK; Future  -      Magnesium; Future  -     COMPREHENSIVE METABOLIC PANEL; Future  -     CBC Auto Differential; Future  -     VITAMIN B12; Future    Type 2 diabetes mellitus with hyperglycemia, without long-term current use of insulin  -     Ambulatory referral/consult to Pharmacy Assistance; Future    Financial difficulties  -     Ambulatory referral/consult to Pharmacy Assistance; Future    Other abnormalities of gait and mobility  -     VITAMIN B12; Future    Other orders  -     Influenza - Quadrivalent (Adjuvanted)      Labs  Refer to pharm assistance. He has not been taking insulin because of cost   Schedule follow up appt based on labs         Care Plan/Goals: Reviewed    Goals    None         Follow up: No follow-ups on file.    After visit summary was printed and given to patient upon discharge today.  Patient goals and care plan are included in After Visit Summary.

## 2023-10-11 ENCOUNTER — TELEPHONE (OUTPATIENT)
Dept: SPORTS MEDICINE | Facility: CLINIC | Age: 68
End: 2023-10-11
Payer: MEDICARE

## 2023-10-11 NOTE — TELEPHONE ENCOUNTER
I spoke with patient, we rescheduled 10/12 appt from 740am( procedure time) to 1pm.  Patient VU

## 2023-10-12 ENCOUNTER — OFFICE VISIT (OUTPATIENT)
Dept: SPORTS MEDICINE | Facility: CLINIC | Age: 68
End: 2023-10-12
Payer: MEDICARE

## 2023-10-12 VITALS — BODY MASS INDEX: 26.63 KG/M2 | WEIGHT: 186 LBS | HEIGHT: 70 IN

## 2023-10-12 DIAGNOSIS — M79.604 PAIN IN BOTH LOWER EXTREMITIES: ICD-10-CM

## 2023-10-12 DIAGNOSIS — M79.605 PAIN IN BOTH LOWER EXTREMITIES: ICD-10-CM

## 2023-10-12 DIAGNOSIS — M25.562 PAIN IN BOTH KNEES, UNSPECIFIED CHRONICITY: ICD-10-CM

## 2023-10-12 DIAGNOSIS — M17.0 BILATERAL PRIMARY OSTEOARTHRITIS OF KNEE: Primary | ICD-10-CM

## 2023-10-12 DIAGNOSIS — M25.561 PAIN IN BOTH KNEES, UNSPECIFIED CHRONICITY: ICD-10-CM

## 2023-10-12 PROCEDURE — 3046F PR MOST RECENT HEMOGLOBIN A1C LEVEL > 9.0%: ICD-10-PCS | Mod: HCNC,CPTII,S$GLB, | Performed by: STUDENT IN AN ORGANIZED HEALTH CARE EDUCATION/TRAINING PROGRAM

## 2023-10-12 PROCEDURE — 99214 PR OFFICE/OUTPT VISIT, EST, LEVL IV, 30-39 MIN: ICD-10-PCS | Mod: 25,HCNC,S$GLB, | Performed by: STUDENT IN AN ORGANIZED HEALTH CARE EDUCATION/TRAINING PROGRAM

## 2023-10-12 PROCEDURE — 3288F PR FALLS RISK ASSESSMENT DOCUMENTED: ICD-10-PCS | Mod: HCNC,CPTII,S$GLB, | Performed by: STUDENT IN AN ORGANIZED HEALTH CARE EDUCATION/TRAINING PROGRAM

## 2023-10-12 PROCEDURE — 3072F LOW RISK FOR RETINOPATHY: CPT | Mod: HCNC,CPTII,S$GLB, | Performed by: STUDENT IN AN ORGANIZED HEALTH CARE EDUCATION/TRAINING PROGRAM

## 2023-10-12 PROCEDURE — 3046F HEMOGLOBIN A1C LEVEL >9.0%: CPT | Mod: HCNC,CPTII,S$GLB, | Performed by: STUDENT IN AN ORGANIZED HEALTH CARE EDUCATION/TRAINING PROGRAM

## 2023-10-12 PROCEDURE — 1101F PT FALLS ASSESS-DOCD LE1/YR: CPT | Mod: HCNC,CPTII,S$GLB, | Performed by: STUDENT IN AN ORGANIZED HEALTH CARE EDUCATION/TRAINING PROGRAM

## 2023-10-12 PROCEDURE — 1159F PR MEDICATION LIST DOCUMENTED IN MEDICAL RECORD: ICD-10-PCS | Mod: HCNC,CPTII,S$GLB, | Performed by: STUDENT IN AN ORGANIZED HEALTH CARE EDUCATION/TRAINING PROGRAM

## 2023-10-12 PROCEDURE — 3072F PR LOW RISK FOR RETINOPATHY: ICD-10-PCS | Mod: HCNC,CPTII,S$GLB, | Performed by: STUDENT IN AN ORGANIZED HEALTH CARE EDUCATION/TRAINING PROGRAM

## 2023-10-12 PROCEDURE — 3066F PR DOCUMENTATION OF TREATMENT FOR NEPHROPATHY: ICD-10-PCS | Mod: HCNC,CPTII,S$GLB, | Performed by: STUDENT IN AN ORGANIZED HEALTH CARE EDUCATION/TRAINING PROGRAM

## 2023-10-12 PROCEDURE — 3008F BODY MASS INDEX DOCD: CPT | Mod: HCNC,CPTII,S$GLB, | Performed by: STUDENT IN AN ORGANIZED HEALTH CARE EDUCATION/TRAINING PROGRAM

## 2023-10-12 PROCEDURE — 97110 THERAPEUTIC EXERCISES: CPT | Mod: 59,GP,HCNC,97 | Performed by: STUDENT IN AN ORGANIZED HEALTH CARE EDUCATION/TRAINING PROGRAM

## 2023-10-12 PROCEDURE — 1125F AMNT PAIN NOTED PAIN PRSNT: CPT | Mod: HCNC,CPTII,S$GLB, | Performed by: STUDENT IN AN ORGANIZED HEALTH CARE EDUCATION/TRAINING PROGRAM

## 2023-10-12 PROCEDURE — 97760 ORTHOTIC MGMT&TRAING 1ST ENC: CPT | Mod: GP,HCNC,97,S$GLB | Performed by: STUDENT IN AN ORGANIZED HEALTH CARE EDUCATION/TRAINING PROGRAM

## 2023-10-12 PROCEDURE — 3060F PR POS MICROALBUMINURIA RESULT DOCUMENTED/REVIEW: ICD-10-PCS | Mod: HCNC,CPTII,S$GLB, | Performed by: STUDENT IN AN ORGANIZED HEALTH CARE EDUCATION/TRAINING PROGRAM

## 2023-10-12 PROCEDURE — 99214 OFFICE O/P EST MOD 30 MIN: CPT | Mod: 25,HCNC,S$GLB, | Performed by: STUDENT IN AN ORGANIZED HEALTH CARE EDUCATION/TRAINING PROGRAM

## 2023-10-12 PROCEDURE — 3066F NEPHROPATHY DOC TX: CPT | Mod: HCNC,CPTII,S$GLB, | Performed by: STUDENT IN AN ORGANIZED HEALTH CARE EDUCATION/TRAINING PROGRAM

## 2023-10-12 PROCEDURE — 97110 PR THERAPEUTIC EXERCISES: ICD-10-PCS | Mod: 59,GP,HCNC,97 | Performed by: STUDENT IN AN ORGANIZED HEALTH CARE EDUCATION/TRAINING PROGRAM

## 2023-10-12 PROCEDURE — 1159F MED LIST DOCD IN RCRD: CPT | Mod: HCNC,CPTII,S$GLB, | Performed by: STUDENT IN AN ORGANIZED HEALTH CARE EDUCATION/TRAINING PROGRAM

## 2023-10-12 PROCEDURE — 99999 PR PBB SHADOW E&M-EST. PATIENT-LVL IV: ICD-10-PCS | Mod: PBBFAC,HCNC,, | Performed by: STUDENT IN AN ORGANIZED HEALTH CARE EDUCATION/TRAINING PROGRAM

## 2023-10-12 PROCEDURE — 3008F PR BODY MASS INDEX (BMI) DOCUMENTED: ICD-10-PCS | Mod: HCNC,CPTII,S$GLB, | Performed by: STUDENT IN AN ORGANIZED HEALTH CARE EDUCATION/TRAINING PROGRAM

## 2023-10-12 PROCEDURE — 3288F FALL RISK ASSESSMENT DOCD: CPT | Mod: HCNC,CPTII,S$GLB, | Performed by: STUDENT IN AN ORGANIZED HEALTH CARE EDUCATION/TRAINING PROGRAM

## 2023-10-12 PROCEDURE — 99999 PR PBB SHADOW E&M-EST. PATIENT-LVL IV: CPT | Mod: PBBFAC,HCNC,, | Performed by: STUDENT IN AN ORGANIZED HEALTH CARE EDUCATION/TRAINING PROGRAM

## 2023-10-12 PROCEDURE — 1101F PR PT FALLS ASSESS DOC 0-1 FALLS W/OUT INJ PAST YR: ICD-10-PCS | Mod: HCNC,CPTII,S$GLB, | Performed by: STUDENT IN AN ORGANIZED HEALTH CARE EDUCATION/TRAINING PROGRAM

## 2023-10-12 PROCEDURE — 1125F PR PAIN SEVERITY QUANTIFIED, PAIN PRESENT: ICD-10-PCS | Mod: HCNC,CPTII,S$GLB, | Performed by: STUDENT IN AN ORGANIZED HEALTH CARE EDUCATION/TRAINING PROGRAM

## 2023-10-12 PROCEDURE — 3060F POS MICROALBUMINURIA REV: CPT | Mod: HCNC,CPTII,S$GLB, | Performed by: STUDENT IN AN ORGANIZED HEALTH CARE EDUCATION/TRAINING PROGRAM

## 2023-10-12 PROCEDURE — 97760 PR ORTHOTIC MGMT&TRAINJ INITIAL ENC EA 15 MINS: ICD-10-PCS | Mod: GP,HCNC,97,S$GLB | Performed by: STUDENT IN AN ORGANIZED HEALTH CARE EDUCATION/TRAINING PROGRAM

## 2023-10-12 NOTE — PROGRESS NOTES
Patient ID: Tramaine Resendiz  YOB: 1955  MRN: [id number]    Chief Complaint: Pain of the Left Knee and Pain of the Right Knee      Referred By: Sultana Ibarra NP for Bilateral Knee Pain    History of Present Illness: Tramaine Resendiz is a right-hand dominant 68 y.o. male who presents today with bilateral knee pain.  Patient reports that he has had bilateral knee pain for the last 6-7 months.  He reports that it has become constant in nature and has become difficult to get up from stooped or laying down positions.  He rates his pain at a 9/10 and describes the pain as a constant, achy, throbbing pain that is located on the lateral aspect of both knees.  He states that he has tried occasional OTC NSAIDs and Tylenol and Voltaren gel and [employer] gel without relief.  He uses a massage pad that seems to provide limited relief.       The patient is active in none.  Occupation: Retired      Past Medical History:   Past Medical History:   Diagnosis Date    Hypertension     Type 2 diabetes mellitus without complications 2012    BS didn't check 11/28/2022     Past Surgical History:   Procedure Laterality Date    HERNIA REPAIR       Family History   Problem Relation Age of Onset    Hypertension Mother     Diabetes Mother      Social History     Socioeconomic History    Marital status:    Tobacco Use    Smoking status: Former     Types: Cigarettes    Smokeless tobacco: Never   Substance and Sexual Activity    Alcohol use: Yes     Medication List with Changes/Refills   Current Medications    BLOOD SUGAR DIAGNOSTIC STRP    To check BG 1-2 times daily, to use with insurance preferred meter    BLOOD-GLUCOSE METER KIT    To check BG 1-2 times daily, to use with insurance preferred meter    COVID EIT23-79,12UP,,ANDU,,PF, (SPIKEVAX [id number],12Y UP,,PF,) 50 MCG/0.5 ML INJECTION    Inject into the muscle.    GLYBURIDE (DIABETA) 5 MG TABLET    Take 1 tablet (5 mg total) by mouth 2 (two) times daily with  meals.    INSULIN DETEMIR U-100, LEVEMIR, (LEVEMIR FLEXPEN) 100 UNIT/ML (3 ML) INPN PEN    Inject 10 Units into the skin every evening.    LANCETS MISC    To check BG 1-2 times daily, to use with insurance preferred meter    MELOXICAM (MOBIC) 7.5 MG TABLET    Take 1 tablet (7.5 mg total) by mouth once daily.    SILDENAFIL (VIAGRA) 100 MG TABLET    Take 1/2 to 1 tab by mouth 30 mins prior to sex    SIMVASTATIN (ZOCOR) 10 MG TABLET    Take 1 tablet (10 mg total) by mouth every evening.    TADALAFIL (CIALIS) 20 MG TAB    Take 1 tablet (20 mg total) by mouth once daily.     Review of patient's allergies indicates:  No Known Allergies    Physical Exam:   Body mass index is 26.69 kg/m².    GENERAL: Well appearing, in no acute distress.  HEAD: Normocephalic and atraumatic.  ENT: External ears and nose grossly normal.  EYES: EOMI bilaterally  PULMONARY: Respirations are grossly even and non-labored.  NEURO: Awake, alert, and oriented x 3.  SKIN: No obvious rashes appreciated.  PSYCH: Mood & affect are appropriate.    Detailed MSK exam:     Right knee exam:   -ROM: extension 0, flexion 120  -TTP: Medial joint line  -effusion: none  -Patellar apprehension negative  -Torin test negative  -stable to varus and valgus stress tests  -Lachman test negative, anterior drawer test negative, posterior drawer test negative    Left knee exam:   -ROM: extension 0, flexion 120  -TTP: Medial joint line  -effusion: none  -Patellar apprehension negative  -Torin test negative  -stable to varus and valgus stress tests  -Lachman test negative, anterior drawer test negative, posterior drawer test negative      Imaging:  CV Ultrasound doppler arterial legs bilat    Bilateral normal misha and waveforms w/o any stenosis. essentail within   normal study  Ankle Brachial Indices (MISHA)    Bilateral misha and waveforms  X-ray Knee Ortho Bilateral  Narrative: EXAM: XR KNEE ORTHO BILAT    CLINICAL HISTORY: Bilateral knee joint pain    FINDINGS:  4 views  bilateral knees including lateral view right knee.  Mild tricompartment degenerative changes with joint space during, sclerosis and marginal aspects greatest within the medial compartments.  No fracture, suspicious bone lesion, erosive change, osteochondral lesion, or loose osteochondral body is identified.  Joint alignment appears anatomic.  No significant right knee joint effusion.  Impression:  As above    Finalized on: 9/12/2023 11:41 AM By:  Luis Fernando Simental MD  [facility]# [id number]      2023-09-12 11:43:[id number]    [facility]        Relevant imaging results were reviewed and interpreted by me and per my read shows mild bilateral arthritic changes with medial compartment joint space narrowing.  This was discussed with the patient and / or family today.     Assessment:  Tramaine Resendiz is a 68 y.o. male presenting with chronic bilateral knee pain.   History, physical and radiographs are consistent with a likely diagnosis of OA.   Plan: discussed various non-operative treatment options. Patient would like to try gel injections. Could also consider steroid injection or iovera procedure in the future. Prior auth for gel injections. Cane given with gait training instructions. Knee braces given. Home exercises given. Continue conservative management for pain.   Follow up for gel injections. All questions answered.      Bilateral primary osteoarthritis of knee  -     HME - OTHER  -     HME - OTHER  -     Prior authorization Order    Pain in both knees, unspecified chronicity  -     Ambulatory referral/consult to Orthopedics  -     HME - OTHER    Pain in both lower extremities  -     Ambulatory referral/consult to Orthopedics         MEDICAL NECESSITY FOR VISCOSUPPLEMENTATION: After thorough evaluation of the patient, I have determined that visco-supplementation is medically necessary. The patient has painful degenerative changes of the knee with failure of conservative treatments including lifestyle modifications and rehabilitation  exercises.  Oral analgesis/NSAIDs have not adequately controlled symptoms and there is radiographic evidence of Kellgren Isiah grade 2 or greater osteoarthritic changes, or in lack of radiographic evidence, there is arthroscopic or other evidence of chondrosis.      At least 15 minutes were spent sizing, fitting, and educating for durable medical equipment application today. This service was performed under direction of Zeb Ponce MD. CPT 97760x2.    At least 15 minutes were spent performing gait training analysis, correction and instruction.  This service was performed by Saundra Kerr, Sports Medicine Assistant under direction from Zeb Ponce MD.  CPT 25753.    At least 15 minutes were spent instructing the patient in therapeutic exercises.  All questions were answered and exercises were provided in the After Visit Summary.  This service was performed under direction of Zeb oPnce MD.  CPT 16640.      A copy of today's visit note has been sent to the referring provider.     Electronically signed:  Zeb Ponce MD, MPH  10/12/2023  2:13 PM

## 2023-10-12 NOTE — PATIENT INSTRUCTIONS
Assessment:  Tramaine Resendiz is a 68 y.o. male   Chief Complaint   Patient presents with    Left Knee - Pain    Right Knee - Pain       Encounter Diagnoses   Name Primary?    Pain in both knees, unspecified chronicity     Pain in both lower extremities         Plan:    Pre auth for bilataeral gel injections - Monovisc   Patient can use ice every 2 hours for 15 minutes as needed  Bilateral hinge knee braces was fitted and issued to patient   Under the direction of Dr. Zeb Ponce, 15 minutes were spent sizing, fitting, and educating for durable medical equipment application today.  CPT 47667.  Issued and fitted for straight cane  Patient may ice every 2 hours for 15 minutes as needed to control pain and swelling.   At least 15 minutes were spent performing gait training analysis, correction and instruction.  This service was performed under the direction from Dr. Zeb Ponce MD.  CPT 80497.  Instructed and issued home exercise program for knee OA  At least 15 minutes were spent developing, teaching, and performing a home exercise program.  A written summary was provided and all questions were answered.  This service was performed under direction the of Dr. Zeb Ponce MD.  CPT 39744.  Apply topical diclofenac (Voltaren) up to 4 times a day to the affected area.  It can be bought over the counter at any local pharmacy.   Follow up as needed        HOW TO USE A CANE    A cane can be helpful if you have minor problems with balance or stability, some weakness in your leg or trunk, an injury, or a pain.    If you are elderly, using a single point cane may help you to walk more comfortably and safely and, in some cases, may make it easier for you to continue living independently.    If you need more stability, a quad cane, which has 4 points, can be useful.    Make Your Home Safer    Making some simple safety modifications to your home can help prevent slips and falls when using your walking aid:    Remove  throw rugs, electrical cords, food spills, and anything else that may cause you to fall.  Arrange furniture so that you have clear, sufficiently wide pathways between rooms.  Keep stairs clear of packages, boxes, or clutter. If necessary, add treads to stairs to prevent slipping.  Walk only in well-lit rooms and install a nightlight along the route between your bedroom and the bathroom.  In the bathroom, use nonslip bath mats, grab bars, a raised toilet seat, and a shower tub seat.  Simplify your household to keep the items you need within easy reach and everything else out of the way.  Carry things hands-free by using a backpack, clinton pack, or an apron with pockets. Many walkers also come with attached pouches.      Proper Positioning  When standing up straight, the top of your cane should reach to the crease in your wrist.  Your elbow should be slightly bent when you hold your cane.  Hold the cane in the hand opposite the side that needs support. For example, if your right leg is injured, hold the cane in your left hand.    Walking  To start, set your cane about one small stride ahead of you and step off on your injured leg.  Finish the step with your good leg.    Stairs  To climb stairs:  Place your cane in the hand opposite your injured leg.  With your free hand, grasp the handrail.  Step up on your good leg first, thn step up on the injured leg.    To come down stairs:  Put your cane on the step first  Then, put your injured leg on the step.  Finally, put your good leg, which carries your body weight, on the step.                              General Arthritis info:    -shiny white stuff at end of a chicken bones is cartilage    -arthritis is wearing away of the cartilage that lines the end of your bones    -osteoarthritis is thought to be a wear and tear phenomenon    -symptoms are due to inflammation of joint causing stiffness, aching, and sometimes swelling    -occasionally sharp pain will occur causing a  give way sensation    -Risk factors: genetic, weight, female > male, age    Treatment options:    -maintain healthy weight (every pound is 4 pounds of pressure on the knee)    -daily moderate exercise (walk, bike, swim 30 minutes per day) to keep joints moving    -daily strengthening exercises (through therapy or on own) to keep muscles supporting joint healthy and strong    -glucosamine 1500mg daily (look for USP label on bottle)    -tylenol as needed for pain (follow directions on the bottle)    -anti-inflammatory medication such as alleve may be helpful- take 1-2 tabs twice daily for 7 days. If it helps your pain, continue. If you do not feel any change, you may stop and then take it as needed.    (you may be given a once daily anti-inflammatory such as MOBIC. If given, avoid other anti-inflammatory medications such as advil, ibuprofen, motrin, naprosyn, alleve, etc)    -if swollen and painful, ice, decrease activity, and take anti-inflammatory daily for 5-7 days and if no relief call your doctor for further options    -consider cortisone injection (every 3-4 months at most)- anti- inflammatory steroid medication that can be injected directly into the joint to reduce inflammation    -consider hyaluronic acid injections (eufflexxa, hyalgan, synvisc, supartz) (every 6 months at most)- protein injection that helps decrease pain and irritability in the joint. It is best used to help prolong intervals between cortisone injections to minimize steroid injections. These are currently approved for knee injections. Discuss with your doctor if other joint involved. Call to seek approval prior to the injections.    -long-term treatment may include a total joint replacement (keep diary of good days and bad days, then evaluate as to when you are ready)                            Follow-up: PRN or sooner if there are any problems between now and then.    Thank you for choosing [facility] and Dr. Carrington  Kris for your orthopedic & sports medicine care. It is our goal to provide you with exceptional care that will help keep you healthy, active, and get you back in the game.    Please do not hesitate to reach out to us via email, phone, or MyChart with any questions, concerns, or feedback.    If you felt that you received exemplary care today, please consider leaving us feedback on [employer] at:  [email or web address]    If you are experiencing pain/discomfort ,or have questions after 5pm and would like to be connected to the [facility] on-call team, please call this number and specify which Sports Medicine provider is treating you: [phone]

## 2023-10-19 ENCOUNTER — TELEPHONE (OUTPATIENT)
Dept: PHARMACY | Facility: CLINIC | Age: 68
End: 2023-10-19
Payer: MEDICARE

## 2023-10-19 NOTE — TELEPHONE ENCOUNTER
I have reached out to Tramaine Resendiz to inform him of the [employer] application process for Levemir and whats required to apply.  Tramaine Resendiz did not answer. The patient is not accepting calls at this time.

## 2023-11-02 ENCOUNTER — TELEPHONE (OUTPATIENT)
Dept: SPORTS MEDICINE | Facility: CLINIC | Age: 68
End: 2023-11-02
Payer: MEDICARE

## 2023-11-02 NOTE — TELEPHONE ENCOUNTER
Attempted to contact patient to see if he was going to make his appointment for his gel injections.  First number listed is not a working number and second number the mail box is full so message could not be left.

## 2023-12-19 NOTE — PROGRESS NOTES
Patient ID: Tramaine Resendiz  YOB: 1955  MRN: [id number]    Chief Complaint: No chief complaint on file.      History of Present Illness: Tramaine Resendiz is a right-hand dominant 67 y.o.  male who presents today with left hip pain that has gotten better with Voltaren gel, HEP, and tylenol. Patient rates the pain at an 1/10. His reports that his  A1C  was a 10 at his last PCP visit. He does not have any other complaints.            The patient is active in none.  Occupation: retired      Past Medical History:   Past Medical History:   Diagnosis Date    Hypertension     Type 2 diabetes mellitus without complications 2012    BS didn't check 11/28/2022     Past Surgical History:   Procedure Laterality Date    HERNIA REPAIR       Family History   Problem Relation Age of Onset    Hypertension Mother     Diabetes Mother      Social History     Socioeconomic History    Marital status:    Tobacco Use    Smoking status: Former     Types: Cigarettes    Smokeless tobacco: Never   Substance and Sexual Activity    Alcohol use: Yes     Medication List with Changes/Refills   Current Medications    BLOOD SUGAR DIAGNOSTIC STRP    To check BG 1-2 times daily, to use with insurance preferred meter    BLOOD-GLUCOSE METER KIT    To check BG 1-2 times daily, to use with insurance preferred meter    GLYBURIDE (DIABETA) 5 MG TABLET    Take 1 tablet (5 mg total) by mouth 2 (two) times daily with meals.    LANCETS MISC    To check BG 1-2 times daily, to use with insurance preferred meter    METFORMIN (GLUCOPHAGE-XR) 500 MG ER 24HR TABLET    Take 1 tablet (500 mg total) by mouth 2 (two) times daily with meals.    SIMVASTATIN (ZOCOR) 10 MG TABLET    Take 1 tablet (10 mg total) by mouth every evening.     Review of patient's allergies indicates:  No Known Allergies    Physical Exam:   Body mass index is 27.43 kg/m².    GENERAL: Well appearing, in no acute distress.  HEAD: Normocephalic and atraumatic.  ENT: External ears  and nose grossly normal.  EYES: EOMI bilaterally  PULMONARY: Respirations are grossly even and non-labored.  NEURO: Awake, alert, and oriented x 3.  SKIN: No obvious rashes appreciated.  PSYCH: Mood & affect are appropriate.    Detailed MSK exam:     Left hip exam:  -ROM: internal rotation 20, external rotation 30  -JOSE negative, FADIR negative, Eusebio negative  -Muscle strength 5/5 abduction, adduction, flexion, extension  -Negative log roll  -Negative straight leg raise     Right hip exam:  -ROM: internal rotation 30, external rotation 40  -JOSE negative, FADIR negative, Eusebio negative  -Muscle strength 5/5 abduction, adduction, flexion, extension  -Negative log roll  -Negative straight leg raise    Imaging:    Relevant imaging results were reviewed and interpreted by me and per my read shows arthritic changes.  This was discussed with the patient and / or family today.     Assessment:  Tramaine Resendiz is a 67 y.o. male following up for left hip pain. Feels 100% better today. No other concerns today. A1c improved from 13 to 10.   Plan: continue conservative management for pain.   Follow up as needed. All questions answered.     Greater trochanteric pain syndrome         Electronically signed:  Zeb Ponce MD, MPH  01/06/2023  9:10 AM     <-- Click to add NO significant Past Surgical History

## 2023-12-22 ENCOUNTER — OFFICE VISIT (OUTPATIENT)
Dept: SPORTS MEDICINE | Facility: CLINIC | Age: 68
End: 2023-12-22
Payer: MEDICARE

## 2023-12-22 ENCOUNTER — TELEPHONE (OUTPATIENT)
Dept: SPORTS MEDICINE | Facility: CLINIC | Age: 68
End: 2023-12-22
Payer: MEDICARE

## 2023-12-22 DIAGNOSIS — M17.0 BILATERAL PRIMARY OSTEOARTHRITIS OF KNEE: Primary | ICD-10-CM

## 2023-12-22 PROCEDURE — 99999 PR PBB SHADOW E&M-EST. PATIENT-LVL I: ICD-10-PCS | Mod: PBBFAC,HCNC,, | Performed by: STUDENT IN AN ORGANIZED HEALTH CARE EDUCATION/TRAINING PROGRAM

## 2023-12-22 PROCEDURE — 99999 PR PBB SHADOW E&M-EST. PATIENT-LVL I: CPT | Mod: PBBFAC,HCNC,, | Performed by: STUDENT IN AN ORGANIZED HEALTH CARE EDUCATION/TRAINING PROGRAM

## 2023-12-22 PROCEDURE — 99499 NO LOS: ICD-10-PCS | Mod: HCNC,S$GLB,, | Performed by: STUDENT IN AN ORGANIZED HEALTH CARE EDUCATION/TRAINING PROGRAM

## 2023-12-22 PROCEDURE — 99499 UNLISTED E&M SERVICE: CPT | Mod: HCNC,S$GLB,, | Performed by: STUDENT IN AN ORGANIZED HEALTH CARE EDUCATION/TRAINING PROGRAM

## 2023-12-22 NOTE — TELEPHONE ENCOUNTER
Patient arrived for 12:40 appt at 12 pm.  Provider and staff were running behind from morning clinic in [place].  Provider went to call patient back for appt at 12:56 and was told that patient just left.  Attempted to contact patient at first 2 numbers in patient chart to try and get patient in for appt this afternoon, but first number was not in service and second number, voicemail was full.

## 2024-01-09 ENCOUNTER — TELEPHONE (OUTPATIENT)
Dept: PREADMISSION TESTING | Facility: HOSPITAL | Age: 69
End: 2024-01-09
Payer: MEDICARE

## 2024-01-09 RX ORDER — NAPROXEN 500 MG/1
500 TABLET ORAL 2 TIMES DAILY WITH MEALS
COMMUNITY
End: 2024-01-25

## 2024-01-09 RX ORDER — GLIMEPIRIDE 2 MG/1
2 TABLET ORAL
COMMUNITY
End: 2024-01-25

## 2024-01-09 RX ORDER — CYCLOBENZAPRINE HCL 10 MG
10 TABLET ORAL NIGHTLY
COMMUNITY
End: 2024-01-25 | Stop reason: SDUPTHER

## 2024-01-09 NOTE — TELEPHONE ENCOUNTER
Returned call to Ольга and could hear patient in background. She states he was on the schedule for a colonoscopy with [facility] tomorrow. Pt had colonoscopy referral from 03/13/2023 from Sultana Ibarra NP. It looks like they were on the PAT schedule to call to schedule 05/03/2023 and said he wanted a call later to schedule. I am not sure if he was called back but I do not see where he was ever scheduled. Informed Ольга he was not on the schedule and she asked when the next available procedure was so I told her that I do have availability tomorrow if she could call their insurance to check and call [facility] to check. She said he had a light breakfast so discussed the risk with that not being completely cleaned out but that we could do clear liquids the rest of the day, dulcolax laxatives at noon, and the prescription prep that we would have to make sure gets signed by the end of today to call in. She had provided her email to send instructions. She then said she wanted to wait until they saw primary care again and get another referral for another call to schedule and the call was ended.

## 2024-01-09 NOTE — TELEPHONE ENCOUNTER
----- Message from Bong Waters sent at 1/9/2024  7:19 AM CST -----  Contact: Ольга/ pt wife  Ольга would like a call back at @ [phone], in regards to checking on a colonoscopy. She states he was supposed to be scheduled on tomorrow.

## 2024-01-09 NOTE — LETTER
Tramaine Resendiz  [address] [place] [address]        You are scheduled for a/an *** (Procedure), on *** (Day),*** (Date).    Your arrival time will be given to you prior to the day of your procedure.  prep from pharmacy as soon as you are scheduled. DO NOT FOLLOW INSTRUCTIONS LISTED ON THE PREP BOX, FOLLOW THE INSTRUCTIONS BELOW.                 *** Your procedure will be performed at [facility] ([facility]). The hospital is located at [address]off [facility] [facility]).     *** Your procedure will be performed at [facility]. [facility] [address], [place] [address].     Three days before colonoscopy:  Avoid high fiber foods such as whole wheat or whole grain breads or pasta, raw vegetables or fruit with peels, corn, beans, peas, lentils, nuts, seeds, and popcorn.  The day before procedure:  You must only drink clear liquids for breakfast, lunch, and dinner. No solid food. Drinking plenty of fluids helps the prep work. Drink at least 16 glasses (8oz glass) of clear liquids during the day.    At 12:00 PM: Take 6 Dulcolax (bisacodyl; no prescription needed, over-the-counter medication) tablets with 8 oz of clear liquid.  At 6:00 PM, Pour one 6 oz. bottle of prep solution into the mixing container. Add cool water to the mixing container to reach the 16-oz. fill line.  Mix well. Drink ALL the contents in the mixing container.   Drink 2 more 16 oz. containers of water. Finish the prep mixture and the 2 glasses of water within 1 hour and 30 minutes.   Drink a minimum of four (8 oz) glasses of clear liquids before midnight to stay hydrated.   After midnight, nothing to drink or eat except for the bowel prep.     Morning of the procedure:  Take the second dose of prep at the time told to you in the confirmation call the day before your procedure.   [place] the time you were instructed:       2:00 AM       or      5:00AM  Pour one 6 oz. bottle of prep solution into the mixing container. Add cool water to the mixing container to reach the 16-oz. fill line.  Mix well. Drink ALL the contents in the mixing container.   Drink 2 more 16 oz. containers of water. Finish the prep mixture and the 2 glasses of water within 1 hour and 30 minutes.   After finishing prep, do not drink or eat anything until after the colonoscopy. You may have a small sip of water with your morning medications. Do not chew gum, eat hard candy, or smoke.   Your colon is well-prepared for the colonoscopy if you are having clear or yellow liquid stool. If your stool is brown, orange, or cloudy, your colon is not clean, please call endoscopy staff at [phone].    Do not take any diabetic medications (including insulin) the morning of the exam. If your blood sugar goes below 70, you may drink 2 ounces of clear juice. Wait 15 minutes, then recheck your blood sugar. If it isn't going up, you may drink another 2 ounces of clear juice and contact the On-Call Nurse line at [phone].   Weight loss medications must be stopped a week before the procedure.   If you are on a blood thinning medication, you will be told how long to hold it before your procedure in the pre-op call.  You must have a licensed  to bring you home. If using public transportation, you must have an adult come with you.    Frequently Asked Questions- Colonoscopy  What are some tips for preparing for a colonoscopy?  Stay near a toilet after you have taken the prep, you will have diarrhea. You may have cramping with your bowel movements.  You may have skin irritation or flaring of hemorrhoids from frequent bowel movements and wiping your bottom, this can be eased with over-the-counter products like baby wipes, Vaseline, or Tucks pads.  If you experience nausea from the prep, take a 30-minute break, rinse your mouth, and continue drinking the prep. Dramamine (Meclizine) is available over  the counter, take ½ of a tablet (12.5 mg) every 6 hours as needed for nausea. Do not take more than 50 mg in 24 hours.   Take 1 simethicone (Gas-X) 125 mg capsule with 8 ounces of clear liquid at noon the day before your procedure. Gas X helps reduce gas discomfort from the prep.   If you have a long drive or need a change to the prep direction times, please call the endoscopy department to talk with our staff at [phone].  If you are female between the ages of 10-55, you may be asked for a urine sample (pregnancy test) after you check in for your procedure. Please let staff know before you go to the restroom.  If you are taking Coumadin (WARFARIN), Plavix (CLOPIDOGREL), and Effient (PRASUGREL) MUST be stopped 5 days prior to exam unless discussed with the doctor performing the test. Eliquis (APIXABAN), Savaysa (EDOXABAN), Arixtra (FONDAPARINUX), and Xarelto (RIVAROXABAN) MUST be stopped 2 days prior to exam unless discussed with the doctor performing the test.  If you are taking glucagon-like peptide-1 receptor agonists (GLP-1 Shira) medications (semaglutide -OZEMPIC, RYBELSUS, WEGOVY; Dulaglutide- TRULICITY; Liraglutide- VICTOZA, SAXENDA; tirzepatide- MOUNJARO) for weight loss or diabetes, these medications MUST be stopped 7 days prior to exam unless discussed with the doctor performing the test.  2. What are some ways to improve the taste of the prep?  Put the bowel prep solution in the refrigerator to chill before beginning the prep. It tastes best cold.  Drinking the prep with a straw.  Mix the prep with Crystal Light (no red or purple flavoring), apple juice, or Gatorade (no red or purple) to improve the flavor.   3. Do I have to drink all the bowel prep and water?  Yes, it is very important to finish all the bowel prep in the split doses, in addition to drinking plenty of clear liquids.   Drinking plenty of clear liquids helps the bowel prep to work and keeps you hydrated. Get any clear liquid that isn't  "red, purple, or orange, such as Gatorade. Drink at least 12 (8 oz) glasses of clear liquids the day before your procedure. Drink at least 1 (8 oz) glass of liquid every hour while you're awake. After the first dose of prep, drink an additional four (8 oz) glasses of clear liquids before midnight.  The first dose of the prep starts to clean out the stool from your colon. The second dose of the prep helps to fully clean out the colon before the procedure.  4. Can I drink other fluids that are not clear?  No, you may only drink clear liquids. In general, if you can see through it and it is a liquid at room temperature, you can drink it.   Clear liquids include coffee (no cream/milk), water, tea, clear carbonated beverages (ginger ale, Sprite, or sparkling water), gelatin or JELLO, apple juice, white grape juice, white cranberry juice, beef or chicken broths/bouillon, Gatorade/Powerade, lemonade, limeade, snowballs, popsicles, and slushes. No "Energy" beverages or alcohol. No juices with pulp.  Do not drink liquids that are red or purple because the dye will stain the walls of your colon and may appear to be a bleed or abnormality.   5. How will I know that the bowel prep is working? Why is it important to have a good prep or a clean colon before the procedure?  You will know that the bowel prep has cleaned out the colon well enough for the procedure if you are having bowel movements that are clear or clear yellow.   You want your colon to be as clean as possible so that the doctor can see the walls of the colon and find tiny polyps or colon cancer. If the colon is not clean, there may be stool covering parts of the colon wall and hiding polyps. If there is stool in the colon, the doctor cannot move the endoscopy scope through the entire colon and the procedure will be canceled.  If you are having bowel movements that are brown (including dark orange and cloudy) on the morning of your procedure, please call the " endoscopy department at [phone] (M-F from 6AM-3PM).    If you have a history of poor bowel prep, constipation, opiate medication use, diabetes, or kidney disease, please let us know, you make require an extended bowel prep to help get your colon clean. Please call the endoscopy scheduling department at [phone].  6. Why doesn't my appointment time show up in [facility] or [facility]?  [facility] and [facility] are not set up to show surgical times. You will be called the day before the procedure and told your arrival time.  7. What should I bring to my appointment?  Bring a list of your current medications, your ID, insurance card, and a method of payment. Leave jewelry and other valuables at home.   Wear clothing that is easy to put back on after the procedure.   You will be responsible for any co-payment at time of registration - Please reach out to Financial Services if you have any questions or concerns at [employer] [phone].  Please plan to be at the hospital for 3 - 4 hours. From the moment you arrive at the hospital and check in, complete the procedure, to the time that you are discharged and brought to your vehicle, this may be up to 3 to 4 hours total.    8. Where is the Endoscopy department located?  If your procedure will be performed at [facility] ([facility]). The hospital is located at [address], off [address] ([facility] Once on [address], [facility] is the second building (Entrance #2) on the left. Check in for your procedure on the 1st floor at Registration.     If your procedure will be performed at [facility] it's the 2 story surgery center on the left.  [facility] [address] [place] [address]. Many GPS systems are NOT providing accurate instructions, take [address], from either direction, to [address] and proceed to the eastbound service road, turning between the mall and [facility]. Once at the  complex, look for signs directing you to Hospital/Surgery. Check in for your procedure at  for Hospital/Surgery.

## 2024-01-11 ENCOUNTER — PROCEDURE VISIT (OUTPATIENT)
Dept: UROLOGY | Facility: CLINIC | Age: 69
End: 2024-01-11
Payer: MEDICARE

## 2024-01-11 VITALS
SYSTOLIC BLOOD PRESSURE: 185 MMHG | BODY MASS INDEX: 27.25 KG/M2 | DIASTOLIC BLOOD PRESSURE: 110 MMHG | WEIGHT: 189.94 LBS | RESPIRATION RATE: 18 BRPM | HEART RATE: 101 BPM

## 2024-01-11 DIAGNOSIS — R97.20 ELEVATED PSA: Primary | ICD-10-CM

## 2024-01-11 PROCEDURE — 99499 UNLISTED E&M SERVICE: CPT | Mod: HCNC,S$GLB,, | Performed by: UROLOGY

## 2024-01-11 NOTE — PROCEDURES
Procedures    Patient states that nobody called him to inform him that he was undergoing a procedure today and that he is leaving for [place] today.  No procedure performed.  We will reschedule prostate biopsy.

## 2024-01-25 ENCOUNTER — OFFICE VISIT (OUTPATIENT)
Dept: INTERNAL MEDICINE | Facility: CLINIC | Age: 69
End: 2024-01-25
Payer: MEDICARE

## 2024-01-25 VITALS
DIASTOLIC BLOOD PRESSURE: 86 MMHG | SYSTOLIC BLOOD PRESSURE: 138 MMHG | HEART RATE: 96 BPM | HEIGHT: 70 IN | OXYGEN SATURATION: 98 % | BODY MASS INDEX: 26.55 KG/M2 | WEIGHT: 185.44 LBS

## 2024-01-25 DIAGNOSIS — E11.65 TYPE 2 DIABETES MELLITUS WITH HYPERGLYCEMIA, WITHOUT LONG-TERM CURRENT USE OF INSULIN: ICD-10-CM

## 2024-01-25 DIAGNOSIS — T14.8XXA MUSCLE STRAIN: ICD-10-CM

## 2024-01-25 DIAGNOSIS — E11.69 HYPERLIPIDEMIA ASSOCIATED WITH TYPE 2 DIABETES MELLITUS: Primary | ICD-10-CM

## 2024-01-25 DIAGNOSIS — E78.5 HYPERLIPIDEMIA ASSOCIATED WITH TYPE 2 DIABETES MELLITUS: Primary | ICD-10-CM

## 2024-01-25 PROCEDURE — 3080F DIAST BP >= 90 MM HG: CPT | Mod: HCNC,CPTII,S$GLB, | Performed by: FAMILY MEDICINE

## 2024-01-25 PROCEDURE — 99999 PR PBB SHADOW E&M-EST. PATIENT-LVL III: CPT | Mod: PBBFAC,HCNC,, | Performed by: FAMILY MEDICINE

## 2024-01-25 PROCEDURE — 1125F AMNT PAIN NOTED PAIN PRSNT: CPT | Mod: HCNC,CPTII,S$GLB, | Performed by: FAMILY MEDICINE

## 2024-01-25 PROCEDURE — 3077F SYST BP >= 140 MM HG: CPT | Mod: HCNC,CPTII,S$GLB, | Performed by: FAMILY MEDICINE

## 2024-01-25 PROCEDURE — 1159F MED LIST DOCD IN RCRD: CPT | Mod: HCNC,CPTII,S$GLB, | Performed by: FAMILY MEDICINE

## 2024-01-25 PROCEDURE — 3008F BODY MASS INDEX DOCD: CPT | Mod: HCNC,CPTII,S$GLB, | Performed by: FAMILY MEDICINE

## 2024-01-25 PROCEDURE — 99214 OFFICE O/P EST MOD 30 MIN: CPT | Mod: HCNC,S$GLB,, | Performed by: FAMILY MEDICINE

## 2024-01-25 RX ORDER — MELOXICAM 7.5 MG/1
7.5 TABLET ORAL DAILY
Qty: 30 TABLET | Refills: 1 | Status: SHIPPED | OUTPATIENT
Start: 2024-01-25

## 2024-01-25 RX ORDER — CYCLOBENZAPRINE HCL 10 MG
10 TABLET ORAL 3 TIMES DAILY
Qty: 30 TABLET | Refills: 1 | Status: SHIPPED | OUTPATIENT
Start: 2024-01-25

## 2024-01-25 RX ORDER — INSULIN GLARGINE 100 [IU]/ML
10 INJECTION, SOLUTION SUBCUTANEOUS NIGHTLY
Qty: 3 ML | Refills: 1 | Status: SHIPPED | OUTPATIENT
Start: 2024-01-25 | End: 2024-03-22 | Stop reason: SDUPTHER

## 2024-01-25 RX ORDER — INSULIN DETEMIR 100 [IU]/ML
10 INJECTION, SOLUTION SUBCUTANEOUS NIGHTLY
Qty: 3 ML | Refills: 0 | Status: SHIPPED | OUTPATIENT
Start: 2024-01-25 | End: 2024-01-25

## 2024-01-25 NOTE — PROGRESS NOTES
Tramaine Resendiz  01/25/2024  [id number]    Jeane Doyle MD  Patient Care Team:  Jeane Doyle MD as PCP - General (Family Medicine)  Sultana Ibarra NP as Nurse Practitioner (Internal Medicine)          Visit Type:a scheduled routine follow-up visit    Chief Complaint:  Chief Complaint   Patient presents with    Hip Pain     Left side       History of Present Illness:  68 year old  Hip pain  Left side  More flank and upper hip  He said he was working on his truck, and he felt he may have moved and it started to hurt.  He did this week ago  He said it might be a little better  He has not been lifting.  Pain does not go down his leg.        Has DM, HLD  Lab Results   Component Value Date    HGBA1C 12.9 (H) 09/11/2023   We talked about starting the insulin injection  He is not taking this.  Not checking       History:  Past Medical History:   Diagnosis Date    Hypertension     Type 2 diabetes mellitus without complications 2012    BS didn't check 11/28/2022     Past Surgical History:   Procedure Laterality Date    HERNIA REPAIR       Family History   Problem Relation Age of Onset    Hypertension Mother     Diabetes Mother      Social History     Socioeconomic History    Marital status:    Tobacco Use    Smoking status: Former     Types: Cigarettes    Smokeless tobacco: Never   Substance and Sexual Activity    Alcohol use: Yes     Patient Active Problem List   Diagnosis    Erectile dysfunction    Type 2 diabetes mellitus with hyperglycemia, without long-term current use of insulin    Hyperlipidemia associated with type 2 diabetes mellitus     Review of patient's allergies indicates:  No Known Allergies    The following were reviewed at this visit: active problem list, medication list, allergies, family history, social history, and health maintenance.    Medications:  Current Outpatient Medications on File Prior to Visit   Medication Sig Dispense Refill    blood sugar diagnostic Strp To check BG 1-2  times daily, to use with insurance preferred meter 100 strip 2    blood-glucose meter kit To check BG 1-2 times daily, to use with insurance preferred meter 1 each 0    glimepiride (AMARYL) 2 MG tablet Take 2 mg by mouth 2 (two) times daily before meals.      glyBURIDE (DIABETA) 5 MG tablet Take 1 tablet (5 mg total) by mouth 2 (two) times daily with meals. 180 tablet 3    lancets Misc To check BG 1-2 times daily, to use with insurance preferred meter 100 each 2    simvastatin (ZOCOR) 10 MG tablet Take 1 tablet (10 mg total) by mouth every evening. 90 tablet 3    tadalafiL (CIALIS) 20 MG Tab Take 1 tablet (20 mg total) by mouth once daily. 30 tablet 11    [DISCONTINUED] cyclobenzaprine (FLEXERIL) 10 MG tablet Take 10 mg by mouth every evening.      [DISCONTINUED] meloxicam (MOBIC) 7.5 MG tablet Take 1 tablet (7.5 mg total) by mouth once daily. 30 tablet 1    COVID dlu03-62,12up,,andu,,PF, (SPIKEVAX 2781-5338,12Y UP,,PF,) 50 mcg/0.5 mL injection Inject into the muscle. (Patient not taking: Reported on 1/25/2024) 0.5 mL 0    sildenafiL (VIAGRA) 100 MG tablet Take 1/2 to 1 tab by mouth 30 mins prior to sex (Patient not taking: Reported on 1/25/2024) 10 tablet 5    [DISCONTINUED] insulin detemir U-100, Levemir, (LEVEMIR FLEXPEN) 100 unit/mL (3 mL) InPn pen Inject 10 Units into the skin every evening. (Patient not taking: Reported on 1/25/2024) 3 mL 0    [DISCONTINUED] naproxen (EC NAPROSYN) 500 MG EC tablet Take 500 mg by mouth 2 (two) times daily with meals.       No current facility-administered medications on file prior to visit.       Medications have been reviewed and reconciled with patient at this visit.  Barriers to medications reviewed with patient.    Adverse reactions to current medications reviewed with patient..    Over the counter medications reviewed and reconciled with patient.    Exam:  Wt Readings from Last 3 Encounters:   01/25/24 84.1 kg (185 lb 6.5 oz)   01/11/24 86.1 kg (189 lb 14.8 oz)   10/12/23  84.4 kg (186 lb)     Temp Readings from Last 3 Encounters:   10/04/23 (!) 95.9 °F (35.5 °C)   09/11/23 96.5 °F (35.8 °C) (Tympanic)   03/23/23 96.2 °F (35.7 °C) (Tympanic)     BP Readings from Last 3 Encounters:   01/25/24 (!) 164/102   01/11/24 (!) 185/110   10/04/23 110/70     Pulse Readings from Last 3 Encounters:   01/25/24 96   01/11/24 101   10/04/23 80     Body mass index is 26.6 kg/m².      Review of Systems   Musculoskeletal:  Positive for joint pain.     Physical Exam  Nursing note reviewed.   Pulmonary:      Effort: Pulmonary effort is normal. No respiratory distress.   Musculoskeletal:         General: Tenderness present.        Legs:       Comments: Muscle pain   Neurological:      Mental Status: He is alert and oriented to person, place, and time.   Psychiatric:         Mood and Affect: Mood normal.         Behavior: Behavior normal.         Thought Content: Thought content normal.         Judgment: Judgment normal.     Protective Sensation (w/ 10 gram monofilament):  Right: Intact  Left: Intact    Visual Inspection:  Normal -  Bilateral    Pedal Pulses:   Right: Present  Left: Present    Posterior Tibialis Pulses:   Right:Present  Left: Present     Laboratory Reviewed ({Yes)  Lab Results   Component Value Date    WBC 8.19 10/04/2023    HGB 13.9 (L) 10/04/2023    HCT 44.5 10/04/2023     10/04/2023    CHOL 166 09/11/2023    TRIG 95 09/11/2023    HDL 48 09/11/2023    ALT 16 10/04/2023    AST 15 10/04/2023     10/04/2023    K 4.4 10/04/2023     10/04/2023    CREATININE 0.9 10/04/2023    BUN 14 10/04/2023    CO2 26 10/04/2023    PSA 5.5 (H) 09/26/2022    HGBA1C 12.9 (H) 09/11/2023       Tramaine was seen today for hip pain.    Diagnoses and all orders for this visit:    Hyperlipidemia associated with type 2 diabetes mellitus  -     Comprehensive Metabolic Panel; Future    Type 2 diabetes mellitus with hyperglycemia, without long-term current use of insulin  -     Comprehensive Metabolic  Panel; Future  -     Hemoglobin A1C; Future  -     Microalbumin/Creatinine Ratio, Urine; Future  -     US AAA Screening; Future    Other orders  -     cyclobenzaprine (FLEXERIL) 10 MG tablet; Take 1 tablet (10 mg total) by mouth 3 (three) times daily.  -     meloxicam (MOBIC) 7.5 MG tablet; Take 1 tablet (7.5 mg total) by mouth once daily.  -     insulin detemir U-100, Levemir, (LEVEMIR FLEXPEN) 100 unit/mL (3 mL) InPn pen; Inject 10 Units into the skin every evening.                Care Plan/Goals: Reviewed    Goals    None         Follow up: No follow-ups on file.    After visit summary was printed and given to patient upon discharge today.  Patient goals and care plan are included in After Visit Summary.

## 2024-01-26 RX ORDER — PEN NEEDLE, DIABETIC 30 GX3/16"
NEEDLE, DISPOSABLE MISCELLANEOUS
OUTPATIENT
Start: 2024-01-26

## 2024-01-26 NOTE — TELEPHONE ENCOUNTER
Pharmacy contacted our office in regards to patient. Says they have a prescription for insulin, but also need a prescription sent in for pen needles

## 2024-01-26 NOTE — TELEPHONE ENCOUNTER
Care Due:                  Date            Visit Type   Department     Provider  --------------------------------------------------------------------------------                                SAME DAY -                              ESTABLISHED   [facility]  Last Visit: 01-      PATIENT      MEDICINE       Jeane Doyle  Next Visit: None Scheduled  None         None Found                                                            Last  Test          Frequency    Reason                     Performed    Due Date  --------------------------------------------------------------------------------    HBA1C.......  6 months...  glyBURIDE, insulin.......  09-   03-    [facility] Embedded Care Due Messages. Reference number: [id number].   1/26/2024 9:43:22 AM CST

## 2024-01-26 NOTE — TELEPHONE ENCOUNTER
----- Message from Susi Mccormack sent at 1/26/2024  8:47 AM CST -----  Regarding: concerns  Name of who is calling:   wife / Ольга      What is the request in detail: Pt is requesting a call back in ref to getting rx for needles sent to the pharmacy as soon as possible/ please call pt when sent  due to being completely out      Can the clinic reply by [facility]:no      What number to call back if not [facility]: [phone]

## 2024-01-26 NOTE — TELEPHONE ENCOUNTER
Refill Decision Note   Tramaine Resendiz  is requesting a refill authorization.  Brief Assessment and Rationale for Refill:  Quick Discontinue     Medication Therapy Plan: Pharmacy is requesting new scripts for the following medications without required information, (sig/ frequency/qty/etc)       Medication Reconciliation Completed: No   Comments: Pharmacies have been requesting medications for patients without required information, (sig, frequency, qty, etc.). In addition, requests are sent for medication(s) pt. are currently not taking, and medications patients have never taken.    We have spoken to the pharmacies about these request types and advised their teams previously that we are unable to assess these New Script requests and require all details for these requests. This is a known issue and has been reported.     No Care Gaps recommended.     Note composed:12:37 PM 01/26/2024

## 2024-02-12 ENCOUNTER — OFFICE VISIT (OUTPATIENT)
Dept: INTERNAL MEDICINE | Facility: CLINIC | Age: 69
End: 2024-02-12
Payer: MEDICARE

## 2024-02-12 ENCOUNTER — HOSPITAL ENCOUNTER (INPATIENT)
Facility: HOSPITAL | Age: 69
LOS: 2 days | Discharge: HOME HEALTH | DRG: 272 | End: 2024-02-15
Attending: EMERGENCY MEDICINE | Admitting: HOSPITALIST
Payer: MEDICARE

## 2024-02-12 VITALS
OXYGEN SATURATION: 94 % | WEIGHT: 184.06 LBS | HEART RATE: 87 BPM | TEMPERATURE: 96 F | BODY MASS INDEX: 26.41 KG/M2 | DIASTOLIC BLOOD PRESSURE: 84 MMHG | SYSTOLIC BLOOD PRESSURE: 132 MMHG

## 2024-02-12 DIAGNOSIS — I21.4 NSTEMI (NON-ST ELEVATED MYOCARDIAL INFARCTION): Primary | ICD-10-CM

## 2024-02-12 DIAGNOSIS — M54.50 LUMBAR PAIN: ICD-10-CM

## 2024-02-12 DIAGNOSIS — R07.9 CHEST PAIN: ICD-10-CM

## 2024-02-12 DIAGNOSIS — R11.0 NAUSEA: ICD-10-CM

## 2024-02-12 DIAGNOSIS — R07.9 CHEST PAIN, UNSPECIFIED TYPE: Primary | ICD-10-CM

## 2024-02-12 DIAGNOSIS — I21.4 NSTEMI (NON-ST ELEVATION MYOCARDIAL INFARCTION): ICD-10-CM

## 2024-02-12 DIAGNOSIS — I21.4 NON-ST ELEVATION MYOCARDIAL INFARCTION (NSTEMI): ICD-10-CM

## 2024-02-12 LAB
ALBUMIN SERPL BCP-MCNC: 3.9 G/DL (ref 3.5–5.2)
ALP SERPL-CCNC: 96 U/L (ref 55–135)
ALT SERPL W/O P-5'-P-CCNC: 17 U/L (ref 10–44)
ANION GAP SERPL CALC-SCNC: 14 MMOL/L (ref 8–16)
AST SERPL-CCNC: 25 U/L (ref 10–40)
BASOPHILS # BLD AUTO: 0.01 K/UL (ref 0–0.2)
BASOPHILS NFR BLD: 0.1 % (ref 0–1.9)
BILIRUB SERPL-MCNC: 0.3 MG/DL (ref 0.1–1)
BNP SERPL-MCNC: 90 PG/ML (ref 0–99)
BUN SERPL-MCNC: 15 MG/DL (ref 8–23)
CALCIUM SERPL-MCNC: 9.3 MG/DL (ref 8.7–10.5)
CHLORIDE SERPL-SCNC: 105 MMOL/L (ref 95–110)
CO2 SERPL-SCNC: 20 MMOL/L (ref 23–29)
CREAT SERPL-MCNC: 1.1 MG/DL (ref 0.5–1.4)
DIFFERENTIAL METHOD BLD: ABNORMAL
EOSINOPHIL # BLD AUTO: 0 K/UL (ref 0–0.5)
EOSINOPHIL NFR BLD: 0.2 % (ref 0–8)
ERYTHROCYTE [DISTWIDTH] IN BLOOD BY AUTOMATED COUNT: 13.8 % (ref 11.5–14.5)
EST. GFR  (NO RACE VARIABLE): >60 ML/MIN/1.73 M^2
GLUCOSE SERPL-MCNC: 243 MG/DL (ref 70–110)
HCT VFR BLD AUTO: 44 % (ref 40–54)
HGB BLD-MCNC: 14.3 G/DL (ref 14–18)
IMM GRANULOCYTES # BLD AUTO: 0.04 K/UL (ref 0–0.04)
IMM GRANULOCYTES NFR BLD AUTO: 0.4 % (ref 0–0.5)
INR PPP: 1 (ref 0.8–1.2)
LYMPHOCYTES # BLD AUTO: 1.5 K/UL (ref 1–4.8)
LYMPHOCYTES NFR BLD: 13.3 % (ref 18–48)
MCH RBC QN AUTO: 26.1 PG (ref 27–31)
MCHC RBC AUTO-ENTMCNC: 32.5 G/DL (ref 32–36)
MCV RBC AUTO: 80 FL (ref 82–98)
MONOCYTES # BLD AUTO: 0.9 K/UL (ref 0.3–1)
MONOCYTES NFR BLD: 7.7 % (ref 4–15)
NEUTROPHILS # BLD AUTO: 8.6 K/UL (ref 1.8–7.7)
NEUTROPHILS NFR BLD: 78.3 % (ref 38–73)
NRBC BLD-RTO: 0 /100 WBC
PLATELET # BLD AUTO: 286 K/UL (ref 150–450)
PMV BLD AUTO: 10.3 FL (ref 9.2–12.9)
POTASSIUM SERPL-SCNC: 4.1 MMOL/L (ref 3.5–5.1)
PROT SERPL-MCNC: 7.7 G/DL (ref 6–8.4)
PROTHROMBIN TIME: 11.3 SEC (ref 9–12.5)
RBC # BLD AUTO: 5.47 M/UL (ref 4.6–6.2)
SODIUM SERPL-SCNC: 139 MMOL/L (ref 136–145)
TROPONIN I SERPL DL<=0.01 NG/ML-MCNC: 1.06 NG/ML (ref 0–0.03)
TROPONIN I SERPL DL<=0.01 NG/ML-MCNC: 1.37 NG/ML (ref 0–0.03)
WBC # BLD AUTO: 10.99 K/UL (ref 3.9–12.7)

## 2024-02-12 PROCEDURE — 96365 THER/PROPH/DIAG IV INF INIT: CPT | Mod: HCNC

## 2024-02-12 PROCEDURE — 93005 ELECTROCARDIOGRAM TRACING: CPT | Mod: HCNC,S$GLB,, | Performed by: PHYSICIAN ASSISTANT

## 2024-02-12 PROCEDURE — 3288F FALL RISK ASSESSMENT DOCD: CPT | Mod: HCNC,CPTII,S$GLB, | Performed by: PHYSICIAN ASSISTANT

## 2024-02-12 PROCEDURE — 86850 RBC ANTIBODY SCREEN: CPT | Mod: HCNC | Performed by: HOSPITALIST

## 2024-02-12 PROCEDURE — G0378 HOSPITAL OBSERVATION PER HR: HCPCS | Mod: HCNC

## 2024-02-12 PROCEDURE — 25000003 PHARM REV CODE 250: Mod: HCNC | Performed by: EMERGENCY MEDICINE

## 2024-02-12 PROCEDURE — 1160F RVW MEDS BY RX/DR IN RCRD: CPT | Mod: HCNC,CPTII,S$GLB, | Performed by: PHYSICIAN ASSISTANT

## 2024-02-12 PROCEDURE — 80061 LIPID PANEL: CPT | Mod: HCNC | Performed by: HOSPITALIST

## 2024-02-12 PROCEDURE — 84484 ASSAY OF TROPONIN QUANT: CPT | Mod: HCNC | Performed by: HOSPITALIST

## 2024-02-12 PROCEDURE — 85025 COMPLETE CBC W/AUTO DIFF WBC: CPT | Mod: HCNC | Performed by: NURSE PRACTITIONER

## 2024-02-12 PROCEDURE — 99999 PR PBB SHADOW E&M-EST. PATIENT-LVL V: CPT | Mod: PBBFAC,HCNC,, | Performed by: PHYSICIAN ASSISTANT

## 2024-02-12 PROCEDURE — 3046F HEMOGLOBIN A1C LEVEL >9.0%: CPT | Mod: HCNC,CPTII,S$GLB, | Performed by: PHYSICIAN ASSISTANT

## 2024-02-12 PROCEDURE — 63600175 PHARM REV CODE 636 W HCPCS: Mod: HCNC | Performed by: EMERGENCY MEDICINE

## 2024-02-12 PROCEDURE — 84484 ASSAY OF TROPONIN QUANT: CPT | Mod: 91,HCNC | Performed by: NURSE PRACTITIONER

## 2024-02-12 PROCEDURE — 93005 ELECTROCARDIOGRAM TRACING: CPT | Mod: HCNC

## 2024-02-12 PROCEDURE — 99291 CRITICAL CARE FIRST HOUR: CPT | Mod: HCNC

## 2024-02-12 PROCEDURE — 85610 PROTHROMBIN TIME: CPT | Mod: HCNC | Performed by: EMERGENCY MEDICINE

## 2024-02-12 PROCEDURE — 3079F DIAST BP 80-89 MM HG: CPT | Mod: HCNC,CPTII,S$GLB, | Performed by: PHYSICIAN ASSISTANT

## 2024-02-12 PROCEDURE — 25000003 PHARM REV CODE 250: Mod: HCNC | Performed by: HOSPITALIST

## 2024-02-12 PROCEDURE — 93010 ELECTROCARDIOGRAM REPORT: CPT | Mod: HCNC,S$GLB,, | Performed by: STUDENT IN AN ORGANIZED HEALTH CARE EDUCATION/TRAINING PROGRAM

## 2024-02-12 PROCEDURE — 1125F AMNT PAIN NOTED PAIN PRSNT: CPT | Mod: HCNC,CPTII,S$GLB, | Performed by: PHYSICIAN ASSISTANT

## 2024-02-12 PROCEDURE — 1101F PT FALLS ASSESS-DOCD LE1/YR: CPT | Mod: HCNC,CPTII,S$GLB, | Performed by: PHYSICIAN ASSISTANT

## 2024-02-12 PROCEDURE — 83036 HEMOGLOBIN GLYCOSYLATED A1C: CPT | Mod: HCNC | Performed by: HOSPITALIST

## 2024-02-12 PROCEDURE — 1159F MED LIST DOCD IN RCRD: CPT | Mod: HCNC,CPTII,S$GLB, | Performed by: PHYSICIAN ASSISTANT

## 2024-02-12 PROCEDURE — 36415 COLL VENOUS BLD VENIPUNCTURE: CPT | Mod: HCNC | Performed by: NURSE PRACTITIONER

## 2024-02-12 PROCEDURE — 83880 ASSAY OF NATRIURETIC PEPTIDE: CPT | Mod: HCNC | Performed by: NURSE PRACTITIONER

## 2024-02-12 PROCEDURE — 3008F BODY MASS INDEX DOCD: CPT | Mod: HCNC,CPTII,S$GLB, | Performed by: PHYSICIAN ASSISTANT

## 2024-02-12 PROCEDURE — 3075F SYST BP GE 130 - 139MM HG: CPT | Mod: HCNC,CPTII,S$GLB, | Performed by: PHYSICIAN ASSISTANT

## 2024-02-12 PROCEDURE — 99213 OFFICE O/P EST LOW 20 MIN: CPT | Mod: HCNC,S$GLB,, | Performed by: PHYSICIAN ASSISTANT

## 2024-02-12 PROCEDURE — 80053 COMPREHEN METABOLIC PANEL: CPT | Mod: HCNC | Performed by: NURSE PRACTITIONER

## 2024-02-12 PROCEDURE — 96366 THER/PROPH/DIAG IV INF ADDON: CPT

## 2024-02-12 RX ORDER — SODIUM CHLORIDE 9 MG/ML
INJECTION, SOLUTION INTRAVENOUS CONTINUOUS
Status: ACTIVE | OUTPATIENT
Start: 2024-02-12 | End: 2024-02-12

## 2024-02-12 RX ORDER — DIPHENHYDRAMINE HCL 50 MG
50 CAPSULE ORAL ONCE
Status: COMPLETED | OUTPATIENT
Start: 2024-02-12 | End: 2024-02-12

## 2024-02-12 RX ORDER — NAPROXEN SODIUM 220 MG/1
81 TABLET, FILM COATED ORAL DAILY
Status: DISCONTINUED | OUTPATIENT
Start: 2024-02-13 | End: 2024-02-15 | Stop reason: HOSPADM

## 2024-02-12 RX ORDER — HEPARIN SODIUM,PORCINE/D5W 25000/250
0-40 INTRAVENOUS SOLUTION INTRAVENOUS CONTINUOUS
Status: DISCONTINUED | OUTPATIENT
Start: 2024-02-12 | End: 2024-02-14

## 2024-02-12 RX ORDER — METOPROLOL TARTRATE 25 MG/1
25 TABLET, FILM COATED ORAL 2 TIMES DAILY
Status: DISCONTINUED | OUTPATIENT
Start: 2024-02-12 | End: 2024-02-13

## 2024-02-12 RX ORDER — NITROGLYCERIN 0.4 MG/1
0.4 TABLET SUBLINGUAL EVERY 5 MIN PRN
Status: DISCONTINUED | OUTPATIENT
Start: 2024-02-12 | End: 2024-02-15 | Stop reason: HOSPADM

## 2024-02-12 RX ORDER — ASPIRIN 325 MG
325 TABLET ORAL
Status: COMPLETED | OUTPATIENT
Start: 2024-02-12 | End: 2024-02-12

## 2024-02-12 RX ORDER — CLOPIDOGREL BISULFATE 75 MG/1
75 TABLET ORAL DAILY
Status: DISCONTINUED | OUTPATIENT
Start: 2024-02-13 | End: 2024-02-13

## 2024-02-12 RX ADMIN — METOPROLOL TARTRATE 25 MG: 25 TABLET, FILM COATED ORAL at 08:02

## 2024-02-12 RX ADMIN — HEPARIN SODIUM 12 UNITS/KG/HR: 10000 INJECTION, SOLUTION INTRAVENOUS at 07:02

## 2024-02-12 RX ADMIN — DIPHENHYDRAMINE HYDROCHLORIDE 50 MG: 50 CAPSULE ORAL at 08:02

## 2024-02-12 RX ADMIN — ASPIRIN 325 MG ORAL TABLET 325 MG: 325 PILL ORAL at 06:02

## 2024-02-12 RX ADMIN — NITROGLYCERIN 1 INCH: 20 OINTMENT TOPICAL at 06:02

## 2024-02-12 RX ADMIN — SODIUM CHLORIDE: 9 INJECTION, SOLUTION INTRAVENOUS at 08:02

## 2024-02-12 NOTE — PATIENT INSTRUCTIONS
Go directly to the Emergency room to have your chest pressure, shortness of breath and nausea evaluated.

## 2024-02-12 NOTE — Clinical Note
The closure device was deployed in the left radial artery. 13 cc's of air were inserted into the closure device.

## 2024-02-12 NOTE — ED NOTES
Bed: 12  Expected date:   Expected time:   Means of arrival: Personal Transportation  Comments:  [place]

## 2024-02-12 NOTE — Clinical Note
The wire was removed from the aorta  Unable to advance catheter through radial artery. Switch to femoral approach..

## 2024-02-12 NOTE — FIRST PROVIDER EVALUATION
Medical screening examination initiated.  I have conducted a focused provider triage encounter, findings are as follows:    Brief history of present illness:  Tramaine Resendiz is a 68 y.o. male reports to ED related to onset of left sided chest pain several hours about 240 pm while at today primary care provider office visit.  At presentation no chest pain, left shoulder still hurts. No prior left shoulder pain.   No trauma or increased work.   New onset chest pain.  No burping or abdominal pain  No nausea.   States mild diaphoresis at time of chest pain.  Pain was 5/10, no chest pain at time evaluation in triage.   He was at his primary care provider office at onset of left chest pain.   Sent to ED by provider Tammie ALANIS       Vitals:    02/12/24 1641   BP: (!) 156/88   BP Location: Right arm   Patient Position: Sitting   Pulse: 91   Resp: 16   Temp: 98 °F (36.7 °C)   TempSrc: Oral   SpO2: 98%   Weight: 83.8 kg (184 lb 13.7 oz)     Pertinent physical exam:  lungs clear. No shortness of breath. No distress. No persisting chest pain.     Brief workup plan:  chest pain work up. EKG view per ED MD, Dr. Denny, not stemi, needs ED bed for work up, continued care.   EKG NSR T wave abnormality, consider inferolateral ischemia.     Preliminary workup initiated; this workup will be continued and followed by the physician or advanced practice provider that is assigned to the patient when roomed.

## 2024-02-12 NOTE — Clinical Note
The catheter was inserted over the wire into the ostium   right coronary artery. An angiography was performed of the right coronary arteries. Multiple views were taken.  All catheter exchanges over 260 Jwire

## 2024-02-12 NOTE — ED PROVIDER NOTES
SCRIBE #1 NOTE: I, [place] Jose, am scribing for, and in the presence of, Luis Fernando Denny MD. I have scribed the entire note.       History     Chief Complaint   Patient presents with    Chest Pain     Pt c/o chest pain x several hours.  He was seen by his doctor's NP and was sent here.  He denies history of heart problems.  He also c/o SOB and nausea.       Review of patient's allergies indicates:   Allergen Reactions    Tomato          History of Present Illness     HPI    2/12/2024, 5:52 PM  History obtained from the patient      History of Present Illness: Tramaine Resendiz is a 68 y.o. male patient with a PMHx of DM type II and HTN who presents to the Emergency Department for evaluation of CP which onset gradually PTA. Pt states that while he was at urgent care for his hip, he started feeling hot and having sxs of CP, SOB, dizziness, and nausea. He described his CP as starting in the center of his chest and radiating to his L shoulder. His sxs have resolved as of now. Pt denies hx of heart problems. No mitigating or exacerbating factors reported. Patient denies any fever, vomiting, abdominal pain, HA, diaphoresis, and all other sxs at this time. No prior tx. No further complaints or concerns at this time.       Arrival mode: Personal vehicle    PCP: Jeane Doyle MD        Past Medical History:  Past Medical History:   Diagnosis Date    Hypertension     Type 2 diabetes mellitus without complications 2012    BS didn't check 11/28/2022       Past Surgical History:  Past Surgical History:   Procedure Laterality Date    HERNIA REPAIR           Family History:  Family History   Problem Relation Age of Onset    Hypertension Mother     Diabetes Mother        Social History:  Social History     Tobacco Use    Smoking status: Former     Types: Cigarettes    Smokeless tobacco: Never   Substance and Sexual Activity    Alcohol use: Yes    Drug use: Not on file    Sexual activity: Not on file        Review of Systems      Review of Systems   Constitutional:  Negative for diaphoresis and fever.   HENT:  Negative for sore throat.    Respiratory:  Negative for shortness of breath.    Cardiovascular:  Positive for chest pain.   Gastrointestinal:  Negative for abdominal pain, nausea and vomiting.   Genitourinary:  Negative for dysuria.   Musculoskeletal:  Negative for back pain.   Skin:  Negative for rash.   Neurological:  Negative for weakness and headaches.   Hematological:  Does not bruise/bleed easily.   All other systems reviewed and are negative.       Physical Exam     Initial Vitals [02/12/24 1641]   BP Pulse Resp Temp SpO2   (!) 156/88 91 16 98 °F (36.7 °C) 98 %      MAP       --          Physical Exam  Nursing Notes and Vital Signs Reviewed.  Constitutional: Patient is in no acute distress. Well-developed and well-nourished.  Head: Atraumatic. Normocephalic.  Eyes: PERRL. EOM intact. Conjunctivae are not pale. No scleral icterus.  ENT: Mucous membranes are moist.  Neck: Supple. Full ROM.   Cardiovascular: Regular rate. Regular rhythm. No murmurs, rubs, or gallops. Distal pulses are 2+ and symmetric.  Pulmonary/Chest: No respiratory distress. Clear to auscultation bilaterally. No wheezing or rales.  Abdominal: Soft and non-distended.  There is no tenderness.  No rebound, guarding, or rigidity.   Genitourinary: No CVA tenderness  Musculoskeletal: Moves all extremities. No obvious deformities. No edema.   Skin: Warm and dry.  Neurological:  Alert, awake, and appropriate.  Normal speech.  No acute focal neurological deficits are appreciated.  Psychiatric: Normal affect. Good eye contact. Appropriate in content.     ED Course   Critical Care    Date/Time: 2/12/2024 6:18 PM    Performed by: Luis Fernando Denny MD  Authorized by: Luis Fernando Denny MD  Direct patient critical care time: 15 minutes  Additional history critical care time: 10 minutes  Ordering / reviewing critical care time: 5 minutes  Documentation critical care time: 5  minutes  Consulting other physicians critical care time: 5 minutes  Consult with family critical care time: 5 minutes  Total critical care time (exclusive of procedural time) : 45 minutes  Critical care time was exclusive of separately billable procedures and treating other patients and teaching time.  Critical care was necessary to treat or prevent imminent or life-threatening deterioration of the following conditions: NSTEMI.  Critical care was time spent personally by me on the following activities: blood draw for specimens, development of treatment plan with patient or surrogate, discussions with consultants, interpretation of cardiac output measurements, evaluation of patient's response to treatment, examination of patient, obtaining history from patient or surrogate, ordering and performing treatments and interventions, ordering and review of laboratory studies, ordering and review of radiographic studies, pulse oximetry, re-evaluation of patient's condition and review of old charts.        ED Vital Signs:  Vitals:    02/12/24 1641 02/12/24 1833   BP: (!) 156/88    Pulse: 91 83   Resp: 16    Temp: 98 °F (36.7 °C)    TempSrc: Oral    SpO2: 98%    Weight: 83.8 kg (184 lb 13.7 oz)        Abnormal Lab Results:  Labs Reviewed   CBC W/ AUTO DIFFERENTIAL - Abnormal; Notable for the following components:       Result Value    MCV 80 (*)     MCH 26.1 (*)     Gran # (ANC) 8.6 (*)     Gran % 78.3 (*)     Lymph % 13.3 (*)     All other components within normal limits   COMPREHENSIVE METABOLIC PANEL - Abnormal; Notable for the following components:    CO2 20 (*)     Glucose 243 (*)     All other components within normal limits   TROPONIN I - Abnormal; Notable for the following components:    Troponin I 1.063 (*)     All other components within normal limits   B-TYPE NATRIURETIC PEPTIDE   TROPONIN I   PROTIME-INR        All Lab Results:  Results for orders placed or performed during the hospital encounter of 02/12/24   CBC  auto differential   Result Value Ref Range    WBC 10.99 3.90 - 12.70 K/uL    RBC 5.47 4.60 - 6.20 M/uL    Hemoglobin 14.3 14.0 - 18.0 g/dL    Hematocrit 44.0 40.0 - 54.0 %    MCV 80 (L) 82 - 98 fL    MCH 26.1 (L) 27.0 - 31.0 pg    MCHC 32.5 32.0 - 36.0 g/dL    RDW 13.8 11.5 - 14.5 %    Platelets 286 150 - 450 K/uL    MPV 10.3 9.2 - 12.9 fL    Immature Granulocytes 0.4 0.0 - 0.5 %    Gran # (ANC) 8.6 (H) 1.8 - 7.7 K/uL    Immature Grans (Abs) 0.04 0.00 - 0.04 K/uL    Lymph # 1.5 1.0 - 4.8 K/uL    Mono # 0.9 0.3 - 1.0 K/uL    Eos # 0.0 0.0 - 0.5 K/uL    Baso # 0.01 0.00 - 0.20 K/uL    nRBC 0 0 /100 WBC    Gran % 78.3 (H) 38.0 - 73.0 %    Lymph % 13.3 (L) 18.0 - 48.0 %    Mono % 7.7 4.0 - 15.0 %    Eosinophil % 0.2 0.0 - 8.0 %    Basophil % 0.1 0.0 - 1.9 %    Differential Method Automated    Comprehensive metabolic panel   Result Value Ref Range    Sodium 139 136 - 145 mmol/L    Potassium 4.1 3.5 - 5.1 mmol/L    Chloride 105 95 - 110 mmol/L    CO2 20 (L) 23 - 29 mmol/L    Glucose 243 (H) 70 - 110 mg/dL    BUN 15 8 - 23 mg/dL    Creatinine 1.1 0.5 - 1.4 mg/dL    Calcium 9.3 8.7 - 10.5 mg/dL    Total Protein 7.7 6.0 - 8.4 g/dL    Albumin 3.9 3.5 - 5.2 g/dL    Total Bilirubin 0.3 0.1 - 1.0 mg/dL    Alkaline Phosphatase 96 55 - 135 U/L    AST 25 10 - 40 U/L    ALT 17 10 - 44 U/L    eGFR >60 >60 mL/min/1.73 m^2    Anion Gap 14 8 - 16 mmol/L   Troponin I #1   Result Value Ref Range    Troponin I 1.063 (H) 0.000 - 0.026 ng/mL   BNP   Result Value Ref Range    BNP 90 0 - 99 pg/mL         Imaging Results:  Imaging Results              X-Ray Chest AP Portable (Final result)  Result time 02/12/24 18:29:21      Final result by Felice Colmenares MD (02/12/24 18:29:21)                   Impression:      No acute abnormality.      Electronically signed by: Felice Colmenares  Date:    02/12/2024  Time:    18:29               Narrative:    EXAMINATION:  XR CHEST AP PORTABLE    CLINICAL HISTORY:  chest pain;    TECHNIQUE:  Single frontal view  of the chest was performed.    COMPARISON:  None    FINDINGS:  The lungs are clear, with normal appearance of pulmonary vasculature and no pleural effusion or pneumothorax.    The cardiac silhouette is normal in size. The hilar and mediastinal contours are unremarkable.    Bones are intact.                                       The EKG was ordered, reviewed, and independently interpreted by the ED provider.  Interpretation time: 14:53  Rate: 75 BPM  Rhythm: normal sinus rhythm  Interpretation: Left axis deviation. Minimal voltage criteria for LVH, may be normal variant. Anteroseptal infarct ,age undetermined. No STEMI.    The EKG was ordered, reviewed, and independently interpreted by the ED provider.  Interpretation time: 16:44  Rate: 94 BPM  Rhythm: normal sinus rhythm  Interpretation: T wave abnormality, consider inferolateral ischemia. No STEMI.           The Emergency Provider reviewed the vital signs and test results, which are outlined above.     ED Discussion       6:28 PM: Discussed pt's case with Dr. Orellana (Interventional cardiology) who recommends using asa IV heparin, beta blockers, nitropaste, and to keep npo, so he will hopefully not have any further chest pain. If he does have anything further he will likely need a cath sooner than later.    6:33 PM: Discussed case with Dr. Urena ([facility] Medicine). Dr. Chagn agrees with current care and management of pt and accepts admission.   Admitting Service:   Admitting Physician: Dr. Chang  Admit to: OBS med/tele    6:33 PM: Re-evaluated pt. I have discussed test results, shared treatment plan, and the need for admission with patient and family at bedside. Pt and family express understanding at this time and agree with all information. All questions answered. Pt and family have no further questions or concerns at this time. Pt is ready for admit.         Medical Decision Making  68-year-old man presented to the emergency department after an episode of  chest pain.  He was chest pain-free on my examination, but had new T-wave inversions on EKG.  History concerning for ACS.  Labs confirmed NSTEMI.  Patient was treated with aspirin, heparin, and admitted to Medicine with cardiology consult    Amount and/or Complexity of Data Reviewed  External Data Reviewed: notes.     Details: Past medical history, medications, and allergies reviewed  Labs: ordered. Decision-making details documented in ED Course.  Radiology: ordered. Decision-making details documented in ED Course.  ECG/medicine tests: ordered and independent interpretation performed. Decision-making details documented in ED Course.    Risk  OTC drugs.  Prescription drug management.  Decision regarding hospitalization.                ED Medication(s):  Medications   aspirin tablet 325 mg (has no administration in time range)   heparin 25,000 units in dextrose 5% (100 units/ml) IV bolus from bag LOW INTENSITY nomogram - [facility] (has no administration in time range)   heparin 25,000 units in dextrose 5% 250 mL (100 units/mL) infusion LOW INTENSITY nomogram - [facility] (has no administration in time range)   heparin 25,000 units in dextrose 5% (100 units/ml) IV bolus from bag LOW INTENSITY nomogram - [facility] (has no administration in time range)   heparin 25,000 units in dextrose 5% (100 units/ml) IV bolus from bag LOW INTENSITY nomogram - [facility] (has no administration in time range)   nitroGLYCERIN 2% TD oint ointment 1 inch (has no administration in time range)       New Prescriptions    No medications on file               Scribe Attestation:   Scribe #1: I performed the above scribed service and the documentation accurately describes the services I performed. I attest to the accuracy of the note.     Attending:   Physician Attestation Statement for Scribe #1: I, Luis Fernando Denny MD, personally performed the services described in this documentation, as scribed by Chantal Garcia, in my presence, and it is both accurate and complete.            Clinical Impression       ICD-10-CM ICD-9-CM   1. NSTEMI (non-ST elevated myocardial infarction)  I21.4 410.70   2. Chest pain  R07.9 786.50       Disposition:   Disposition: Placed in Observation  Condition: Luis Fernando Piña MD  02/19/24 1553

## 2024-02-12 NOTE — Clinical Note
The right DP pulse was 2+.  The PT pulses were 2+ bilaterally. The left radial pulse was allens test negative.

## 2024-02-13 PROBLEM — E11.9 TYPE 2 DIABETES MELLITUS: Status: ACTIVE | Noted: 2020-08-19

## 2024-02-13 PROBLEM — I21.4 NSTEMI (NON-ST ELEVATED MYOCARDIAL INFARCTION): Status: ACTIVE | Noted: 2024-02-13

## 2024-02-13 PROBLEM — R07.9 CHEST PAIN: Status: RESOLVED | Noted: 2024-02-13 | Resolved: 2024-02-13

## 2024-02-13 PROBLEM — R07.9 CHEST PAIN: Status: ACTIVE | Noted: 2024-02-13

## 2024-02-13 PROBLEM — I10 HYPERTENSION: Status: ACTIVE | Noted: 2024-02-13

## 2024-02-13 LAB
ABO + RH BLD: NORMAL
ANION GAP SERPL CALC-SCNC: 11 MMOL/L (ref 8–16)
AORTIC ROOT ANNULUS: 3.24 CM
APTT PPP: 123.2 SEC (ref 21–32)
APTT PPP: 50.4 SEC (ref 21–32)
APTT PPP: 60.5 SEC (ref 21–32)
APTT PPP: 72.6 SEC (ref 21–32)
ASCENDING AORTA: 3.36 CM
AV INDEX (PROSTH): 0.92
AV MEAN GRADIENT: 3 MMHG
AV PEAK GRADIENT: 5 MMHG
AV VALVE AREA BY VELOCITY RATIO: 2.45 CM²
AV VALVE AREA: 2.83 CM²
AV VELOCITY RATIO: 0.79
BASOPHILS # BLD AUTO: 0.01 K/UL (ref 0–0.2)
BASOPHILS NFR BLD: 0.1 % (ref 0–1.9)
BLD GP AB SCN CELLS X3 SERPL QL: NORMAL
BSA FOR ECHO PROCEDURE: 2.04 M2
BUN SERPL-MCNC: 17 MG/DL (ref 8–23)
CALCIUM SERPL-MCNC: 8.8 MG/DL (ref 8.7–10.5)
CATH EF QUANTITATIVE: 55 %
CHLORIDE SERPL-SCNC: 107 MMOL/L (ref 95–110)
CHOLEST SERPL-MCNC: 154 MG/DL (ref 120–199)
CHOLEST/HDLC SERPL: 3.6 {RATIO} (ref 2–5)
CO2 SERPL-SCNC: 21 MMOL/L (ref 23–29)
CREAT SERPL-MCNC: 0.9 MG/DL (ref 0.5–1.4)
CV ECHO LV RWT: 0.69 CM
DIFFERENTIAL METHOD BLD: ABNORMAL
DOP CALC AO PEAK VEL: 1.12 M/S
DOP CALC AO VTI: 20.2 CM
DOP CALC LVOT AREA: 3.1 CM2
DOP CALC LVOT DIAMETER: 1.98 CM
DOP CALC LVOT PEAK VEL: 0.89 M/S
DOP CALC LVOT STROKE VOLUME: 57.24 CM3
DOP CALC RVOT PEAK VEL: 0.62 M/S
DOP CALC RVOT VTI: 12.8 CM
DOP CALCLVOT PEAK VEL VTI: 18.6 CM
E WAVE DECELERATION TIME: 287.3 MSEC
E/A RATIO: 0.79
E/E' RATIO: 9.23 M/S
ECHO LV POSTERIOR WALL: 1.36 CM (ref 0.6–1.1)
EOSINOPHIL # BLD AUTO: 0.1 K/UL (ref 0–0.5)
EOSINOPHIL NFR BLD: 0.7 % (ref 0–8)
ERYTHROCYTE [DISTWIDTH] IN BLOOD BY AUTOMATED COUNT: 13.8 % (ref 11.5–14.5)
EST. GFR  (NO RACE VARIABLE): >60 ML/MIN/1.73 M^2
ESTIMATED AVG GLUCOSE: 289 MG/DL (ref 68–131)
FRACTIONAL SHORTENING: 32 % (ref 28–44)
GLUCOSE SERPL-MCNC: 194 MG/DL (ref 70–110)
HBA1C MFR BLD: 11.7 % (ref 4–5.6)
HCT VFR BLD AUTO: 35.2 % (ref 40–54)
HCT VFR BLD AUTO: 38 % (ref 40–54)
HDLC SERPL-MCNC: 43 MG/DL (ref 40–75)
HDLC SERPL: 27.9 % (ref 20–50)
HGB BLD-MCNC: 11.2 G/DL (ref 14–18)
HGB BLD-MCNC: 12.4 G/DL (ref 14–18)
IMM GRANULOCYTES # BLD AUTO: 0.03 K/UL (ref 0–0.04)
IMM GRANULOCYTES NFR BLD AUTO: 0.4 % (ref 0–0.5)
INTERVENTRICULAR SEPTUM: 1.54 CM (ref 0.6–1.1)
IVC DIAMETER: 1.8 CM
IVRT: 91.34 MSEC
LA MAJOR: 4.99 CM
LA MINOR: 4.84 CM
LA WIDTH: 3.3 CM
LDLC SERPL CALC-MCNC: 99.2 MG/DL (ref 63–159)
LEFT ATRIUM SIZE: 3.15 CM
LEFT ATRIUM VOLUME INDEX: 21.5 ML/M2
LEFT ATRIUM VOLUME: 43.42 CM3
LEFT INTERNAL DIMENSION IN SYSTOLE: 2.65 CM (ref 2.1–4)
LEFT VENTRICLE DIASTOLIC VOLUME INDEX: 33.08 ML/M2
LEFT VENTRICLE DIASTOLIC VOLUME: 66.82 ML
LEFT VENTRICLE MASS INDEX: 106 G/M2
LEFT VENTRICLE SYSTOLIC VOLUME INDEX: 12.8 ML/M2
LEFT VENTRICLE SYSTOLIC VOLUME: 25.82 ML
LEFT VENTRICULAR INTERNAL DIMENSION IN DIASTOLE: 3.92 CM (ref 3.5–6)
LEFT VENTRICULAR MASS: 214.41 G
LV LATERAL E/E' RATIO: 7.5 M/S
LV SEPTAL E/E' RATIO: 12 M/S
LVOT MG: 1.99 MMHG
LVOT MV: 0.69 CM/S
LYMPHOCYTES # BLD AUTO: 2.1 K/UL (ref 1–4.8)
LYMPHOCYTES NFR BLD: 24.5 % (ref 18–48)
MCH RBC QN AUTO: 26.1 PG (ref 27–31)
MCHC RBC AUTO-ENTMCNC: 32.6 G/DL (ref 32–36)
MCV RBC AUTO: 80 FL (ref 82–98)
MONOCYTES # BLD AUTO: 0.9 K/UL (ref 0.3–1)
MONOCYTES NFR BLD: 10.7 % (ref 4–15)
MV PEAK A VEL: 0.76 M/S
MV PEAK E VEL: 0.6 M/S
MV STENOSIS PRESSURE HALF TIME: 83.32 MS
MV VALVE AREA P 1/2 METHOD: 2.64 CM2
NEUTROPHILS # BLD AUTO: 5.4 K/UL (ref 1.8–7.7)
NEUTROPHILS NFR BLD: 63.6 % (ref 38–73)
NONHDLC SERPL-MCNC: 111 MG/DL
NRBC BLD-RTO: 0 /100 WBC
OHS QRS DURATION: 78 MS
OHS QRS DURATION: 84 MS
OHS QTC CALCULATION: 444 MS
OHS QTC CALCULATION: 451 MS
PISA TR MAX VEL: 1.75 M/S
PLATELET # BLD AUTO: 255 K/UL (ref 150–450)
PMV BLD AUTO: 10.4 FL (ref 9.2–12.9)
POC ACTIVATED CLOTTING TIME K: 179 SEC (ref 74–137)
POCT GLUCOSE: 168 MG/DL (ref 70–110)
POCT GLUCOSE: 199 MG/DL (ref 70–110)
POCT GLUCOSE: 202 MG/DL (ref 70–110)
POCT GLUCOSE: 203 MG/DL (ref 70–110)
POTASSIUM SERPL-SCNC: 3.8 MMOL/L (ref 3.5–5.1)
PV MEAN GRADIENT: 1 MMHG
RA MAJOR: 3.85 CM
RA PRESSURE ESTIMATED: 3 MMHG
RA WIDTH: 2.7 CM
RBC # BLD AUTO: 4.75 M/UL (ref 4.6–6.2)
RV TB RVSP: 5 MMHG
SAMPLE: ABNORMAL
SODIUM SERPL-SCNC: 139 MMOL/L (ref 136–145)
SPECIMEN OUTDATE: NORMAL
STJ: 3.38 CM
TDI LATERAL: 0.08 M/S
TDI SEPTAL: 0.05 M/S
TDI: 0.07 M/S
TR MAX PG: 12 MMHG
TR MEAN GRADIENT: 12 MMHG
TRICUSPID ANNULAR PLANE SYSTOLIC EXCURSION: 1.81 CM
TRIGL SERPL-MCNC: 59 MG/DL (ref 30–150)
TROPONIN I SERPL DL<=0.01 NG/ML-MCNC: 4.37 NG/ML (ref 0–0.03)
TROPONIN I SERPL DL<=0.01 NG/ML-MCNC: 5.28 NG/ML (ref 0–0.03)
TROPONIN I SERPL DL<=0.01 NG/ML-MCNC: 6.9 NG/ML (ref 0–0.03)
TROPONIN I SERPL DL<=0.01 NG/ML-MCNC: 7.96 NG/ML (ref 0–0.03)
TV REST PULMONARY ARTERY PRESSURE: 15 MMHG
WBC # BLD AUTO: 8.53 K/UL (ref 3.9–12.7)
Z-SCORE OF LEFT VENTRICULAR DIMENSION IN END DIASTOLE: -4.18
Z-SCORE OF LEFT VENTRICULAR DIMENSION IN END SYSTOLE: -2.53

## 2024-02-13 PROCEDURE — 63600175 PHARM REV CODE 636 W HCPCS: Mod: HCNC | Performed by: INTERNAL MEDICINE

## 2024-02-13 PROCEDURE — 93458 L HRT ARTERY/VENTRICLE ANGIO: CPT | Mod: 59,HCNC | Performed by: INTERNAL MEDICINE

## 2024-02-13 PROCEDURE — 99900035 HC TECH TIME PER 15 MIN (STAT): Mod: HCNC

## 2024-02-13 PROCEDURE — C1887 CATHETER, GUIDING: HCPCS | Mod: HCNC | Performed by: INTERNAL MEDICINE

## 2024-02-13 PROCEDURE — 25000003 PHARM REV CODE 250: Mod: HCNC | Performed by: INTERNAL MEDICINE

## 2024-02-13 PROCEDURE — 99153 MOD SED SAME PHYS/QHP EA: CPT | Mod: HCNC | Performed by: INTERNAL MEDICINE

## 2024-02-13 PROCEDURE — 96372 THER/PROPH/DIAG INJ SC/IM: CPT | Performed by: HOSPITALIST

## 2024-02-13 PROCEDURE — 4A023N7 MEASUREMENT OF CARDIAC SAMPLING AND PRESSURE, LEFT HEART, PERCUTANEOUS APPROACH: ICD-10-PCS | Performed by: INTERNAL MEDICINE

## 2024-02-13 PROCEDURE — 85730 THROMBOPLASTIN TIME PARTIAL: CPT | Mod: HCNC | Performed by: HOSPITALIST

## 2024-02-13 PROCEDURE — 02C03ZZ EXTIRPATION OF MATTER FROM CORONARY ARTERY, ONE ARTERY, PERCUTANEOUS APPROACH: ICD-10-PCS | Performed by: INTERNAL MEDICINE

## 2024-02-13 PROCEDURE — 25000003 PHARM REV CODE 250: Mod: HCNC | Performed by: PHYSICIAN ASSISTANT

## 2024-02-13 PROCEDURE — 93458 L HRT ARTERY/VENTRICLE ANGIO: CPT | Mod: 26,59,51, | Performed by: INTERNAL MEDICINE

## 2024-02-13 PROCEDURE — 93010 ELECTROCARDIOGRAM REPORT: CPT | Mod: HCNC,,, | Performed by: INTERNAL MEDICINE

## 2024-02-13 PROCEDURE — 99152 MOD SED SAME PHYS/QHP 5/>YRS: CPT | Mod: ,,, | Performed by: INTERNAL MEDICINE

## 2024-02-13 PROCEDURE — 92978 ENDOLUMINL IVUS OCT C 1ST: CPT | Mod: HCNC | Performed by: INTERNAL MEDICINE

## 2024-02-13 PROCEDURE — 25000003 PHARM REV CODE 250: Mod: HCNC | Performed by: HOSPITALIST

## 2024-02-13 PROCEDURE — 63600175 PHARM REV CODE 636 W HCPCS: Mod: HCNC | Performed by: EMERGENCY MEDICINE

## 2024-02-13 PROCEDURE — 25500020 PHARM REV CODE 255: Mod: HCNC | Performed by: INTERNAL MEDICINE

## 2024-02-13 PROCEDURE — 027137Z DILATION OF CORONARY ARTERY, TWO ARTERIES WITH FOUR OR MORE DRUG-ELUTING INTRALUMINAL DEVICES, PERCUTANEOUS APPROACH: ICD-10-PCS | Performed by: INTERNAL MEDICINE

## 2024-02-13 PROCEDURE — B2111ZZ FLUOROSCOPY OF MULTIPLE CORONARY ARTERIES USING LOW OSMOLAR CONTRAST: ICD-10-PCS | Performed by: INTERNAL MEDICINE

## 2024-02-13 PROCEDURE — B2151ZZ FLUOROSCOPY OF LEFT HEART USING LOW OSMOLAR CONTRAST: ICD-10-PCS | Performed by: INTERNAL MEDICINE

## 2024-02-13 PROCEDURE — C1725 CATH, TRANSLUMIN NON-LASER: HCPCS | Mod: HCNC | Performed by: INTERNAL MEDICINE

## 2024-02-13 PROCEDURE — 96366 THER/PROPH/DIAG IV INF ADDON: CPT

## 2024-02-13 PROCEDURE — C1874 STENT, COATED/COV W/DEL SYS: HCPCS | Mod: HCNC | Performed by: INTERNAL MEDICINE

## 2024-02-13 PROCEDURE — 36415 COLL VENOUS BLD VENIPUNCTURE: CPT | Mod: HCNC,XB | Performed by: INTERNAL MEDICINE

## 2024-02-13 PROCEDURE — 84484 ASSAY OF TROPONIN QUANT: CPT | Mod: 91,HCNC | Performed by: PHYSICIAN ASSISTANT

## 2024-02-13 PROCEDURE — 63600175 PHARM REV CODE 636 W HCPCS: Mod: JZ,JG,HCNC | Performed by: INTERNAL MEDICINE

## 2024-02-13 PROCEDURE — C1769 GUIDE WIRE: HCPCS | Mod: HCNC | Performed by: INTERNAL MEDICINE

## 2024-02-13 PROCEDURE — 84484 ASSAY OF TROPONIN QUANT: CPT | Mod: HCNC | Performed by: HOSPITALIST

## 2024-02-13 PROCEDURE — 27201423 OPTIME MED/SURG SUP & DEVICES STERILE SUPPLY: Mod: HCNC | Performed by: INTERNAL MEDICINE

## 2024-02-13 PROCEDURE — C1757 CATH, THROMBECTOMY/EMBOLECT: HCPCS | Mod: HCNC | Performed by: INTERNAL MEDICINE

## 2024-02-13 PROCEDURE — 36415 COLL VENOUS BLD VENIPUNCTURE: CPT | Mod: HCNC | Performed by: HOSPITALIST

## 2024-02-13 PROCEDURE — 85347 COAGULATION TIME ACTIVATED: CPT | Mod: HCNC | Performed by: INTERNAL MEDICINE

## 2024-02-13 PROCEDURE — C1894 INTRO/SHEATH, NON-LASER: HCPCS | Mod: HCNC | Performed by: INTERNAL MEDICINE

## 2024-02-13 PROCEDURE — 99222 1ST HOSP IP/OBS MODERATE 55: CPT | Mod: 25,HCNC,, | Performed by: PHYSICIAN ASSISTANT

## 2024-02-13 PROCEDURE — 20000000 HC ICU ROOM: Mod: HCNC

## 2024-02-13 PROCEDURE — 94761 N-INVAS EAR/PLS OXIMETRY MLT: CPT | Mod: HCNC

## 2024-02-13 PROCEDURE — C1760 CLOSURE DEV, VASC: HCPCS | Mod: HCNC | Performed by: INTERNAL MEDICINE

## 2024-02-13 PROCEDURE — 85025 COMPLETE CBC W/AUTO DIFF WBC: CPT | Mod: HCNC | Performed by: EMERGENCY MEDICINE

## 2024-02-13 PROCEDURE — 85018 HEMOGLOBIN: CPT | Mod: HCNC | Performed by: PHYSICIAN ASSISTANT

## 2024-02-13 PROCEDURE — B241ZZ3 ULTRASONOGRAPHY OF MULTIPLE CORONARY ARTERIES, INTRAVASCULAR: ICD-10-PCS | Performed by: INTERNAL MEDICINE

## 2024-02-13 PROCEDURE — 80048 BASIC METABOLIC PNL TOTAL CA: CPT | Mod: HCNC | Performed by: PHYSICIAN ASSISTANT

## 2024-02-13 PROCEDURE — 85730 THROMBOPLASTIN TIME PARTIAL: CPT | Mod: 91,HCNC | Performed by: INTERNAL MEDICINE

## 2024-02-13 PROCEDURE — 99152 MOD SED SAME PHYS/QHP 5/>YRS: CPT | Mod: HCNC | Performed by: INTERNAL MEDICINE

## 2024-02-13 PROCEDURE — C1753 CATH, INTRAVAS ULTRASOUND: HCPCS | Mod: HCNC | Performed by: INTERNAL MEDICINE

## 2024-02-13 PROCEDURE — C9399 UNCLASSIFIED DRUGS OR BIOLOG: HCPCS | Mod: HCNC | Performed by: INTERNAL MEDICINE

## 2024-02-13 PROCEDURE — C9600 PERC DRUG-EL COR STENT SING: HCPCS | Mod: LD,RC,HCNC | Performed by: INTERNAL MEDICINE

## 2024-02-13 PROCEDURE — 27000221 HC OXYGEN, UP TO 24 HOURS: Mod: HCNC

## 2024-02-13 PROCEDURE — 36415 COLL VENOUS BLD VENIPUNCTURE: CPT | Mod: HCNC,XB | Performed by: PHYSICIAN ASSISTANT

## 2024-02-13 PROCEDURE — 85014 HEMATOCRIT: CPT | Mod: HCNC | Performed by: PHYSICIAN ASSISTANT

## 2024-02-13 PROCEDURE — 92978 ENDOLUMINL IVUS OCT C 1ST: CPT | Mod: 26,,, | Performed by: INTERNAL MEDICINE

## 2024-02-13 PROCEDURE — 92928 PRQ TCAT PLMT NTRAC ST 1 LES: CPT | Mod: LD,RC,HCNC, | Performed by: INTERNAL MEDICINE

## 2024-02-13 PROCEDURE — 93005 ELECTROCARDIOGRAM TRACING: CPT | Mod: HCNC

## 2024-02-13 PROCEDURE — 5A02210 ASSISTANCE WITH CARDIAC OUTPUT USING BALLOON PUMP, CONTINUOUS: ICD-10-PCS | Performed by: INTERNAL MEDICINE

## 2024-02-13 DEVICE — ANGIO-SEAL VIP VASCULAR CLOSURE DEVICE
Type: IMPLANTABLE DEVICE | Site: GROIN | Status: FUNCTIONAL
Brand: ANGIO-SEAL

## 2024-02-13 DEVICE — IMPLANTABLE DEVICE: Type: IMPLANTABLE DEVICE | Site: HEART | Status: FUNCTIONAL

## 2024-02-13 DEVICE — EVEROLIMUS-ELUTING PLATINUM CHROMIUM CORONARY STENT SYSTEM
Type: IMPLANTABLE DEVICE | Site: HEART | Status: FUNCTIONAL
Brand: SYNERGY™ XD

## 2024-02-13 RX ORDER — DEXMEDETOMIDINE HYDROCHLORIDE 4 UG/ML
0-1.4 INJECTION INTRAVENOUS CONTINUOUS
Status: DISCONTINUED | OUTPATIENT
Start: 2024-02-13 | End: 2024-02-15 | Stop reason: HOSPADM

## 2024-02-13 RX ORDER — IBUPROFEN 200 MG
24 TABLET ORAL
Status: DISCONTINUED | OUTPATIENT
Start: 2024-02-13 | End: 2024-02-13

## 2024-02-13 RX ORDER — SODIUM CHLORIDE 9 MG/ML
INJECTION, SOLUTION INTRAVENOUS CONTINUOUS
Status: ACTIVE | OUTPATIENT
Start: 2024-02-13 | End: 2024-02-14

## 2024-02-13 RX ORDER — NITROGLYCERIN 5 MG/ML
INJECTION, SOLUTION INTRAVENOUS
Status: DISCONTINUED | OUTPATIENT
Start: 2024-02-13 | End: 2024-02-13 | Stop reason: HOSPADM

## 2024-02-13 RX ORDER — MIDAZOLAM HYDROCHLORIDE 1 MG/ML
INJECTION, SOLUTION INTRAMUSCULAR; INTRAVENOUS
Status: DISCONTINUED | OUTPATIENT
Start: 2024-02-13 | End: 2024-02-13 | Stop reason: HOSPADM

## 2024-02-13 RX ORDER — GLUCAGON 1 MG
1 KIT INJECTION
Status: DISCONTINUED | OUTPATIENT
Start: 2024-02-13 | End: 2024-02-15 | Stop reason: HOSPADM

## 2024-02-13 RX ORDER — METOPROLOL TARTRATE 25 MG/1
25 TABLET, FILM COATED ORAL 2 TIMES DAILY
Status: DISCONTINUED | OUTPATIENT
Start: 2024-02-13 | End: 2024-02-15 | Stop reason: HOSPADM

## 2024-02-13 RX ORDER — MUPIROCIN 20 MG/G
OINTMENT TOPICAL 2 TIMES DAILY
Status: DISCONTINUED | OUTPATIENT
Start: 2024-02-13 | End: 2024-02-15 | Stop reason: HOSPADM

## 2024-02-13 RX ORDER — PHENYLEPHRINE HYDROCHLORIDE 10 MG/ML
INJECTION INTRAVENOUS
Status: DISCONTINUED | OUTPATIENT
Start: 2024-02-13 | End: 2024-02-13 | Stop reason: HOSPADM

## 2024-02-13 RX ORDER — CEFAZOLIN SODIUM 1 G/3ML
INJECTION, POWDER, FOR SOLUTION INTRAMUSCULAR; INTRAVENOUS
Status: DISCONTINUED | OUTPATIENT
Start: 2024-02-13 | End: 2024-02-13 | Stop reason: HOSPADM

## 2024-02-13 RX ORDER — ACETAMINOPHEN 325 MG/1
650 TABLET ORAL EVERY 4 HOURS PRN
Status: DISCONTINUED | OUTPATIENT
Start: 2024-02-13 | End: 2024-02-15 | Stop reason: HOSPADM

## 2024-02-13 RX ORDER — SODIUM NITROPRUSSIDE 25 MG/ML
INJECTION INTRAVENOUS
Status: DISCONTINUED | OUTPATIENT
Start: 2024-02-13 | End: 2024-02-13 | Stop reason: HOSPADM

## 2024-02-13 RX ORDER — ATROPINE SULFATE 0.1 MG/ML
INJECTION INTRAVENOUS
Status: DISCONTINUED | OUTPATIENT
Start: 2024-02-13 | End: 2024-02-13 | Stop reason: HOSPADM

## 2024-02-13 RX ORDER — INSULIN ASPART 100 [IU]/ML
0-10 INJECTION, SOLUTION INTRAVENOUS; SUBCUTANEOUS
Status: DISCONTINUED | OUTPATIENT
Start: 2024-02-13 | End: 2024-02-15 | Stop reason: HOSPADM

## 2024-02-13 RX ORDER — SODIUM CHLORIDE 9 MG/ML
INJECTION, SOLUTION INTRAVENOUS CONTINUOUS
Status: DISCONTINUED | OUTPATIENT
Start: 2024-02-13 | End: 2024-02-13

## 2024-02-13 RX ORDER — DOPAMINE HYDROCHLORIDE 160 MG/100ML
INJECTION, SOLUTION INTRAVENOUS
Status: DISCONTINUED | OUTPATIENT
Start: 2024-02-13 | End: 2024-02-15 | Stop reason: HOSPADM

## 2024-02-13 RX ORDER — GLUCAGON 1 MG
1 KIT INJECTION
Status: DISCONTINUED | OUTPATIENT
Start: 2024-02-13 | End: 2024-02-13

## 2024-02-13 RX ORDER — LIDOCAINE HYDROCHLORIDE 20 MG/ML
INJECTION, SOLUTION INFILTRATION; PERINEURAL
Status: DISCONTINUED | OUTPATIENT
Start: 2024-02-13 | End: 2024-02-13 | Stop reason: HOSPADM

## 2024-02-13 RX ORDER — ONDANSETRON 8 MG/1
8 TABLET, ORALLY DISINTEGRATING ORAL EVERY 8 HOURS PRN
Status: DISCONTINUED | OUTPATIENT
Start: 2024-02-13 | End: 2024-02-15 | Stop reason: HOSPADM

## 2024-02-13 RX ORDER — IBUPROFEN 200 MG
16 TABLET ORAL
Status: DISCONTINUED | OUTPATIENT
Start: 2024-02-13 | End: 2024-02-13

## 2024-02-13 RX ORDER — HEPARIN SODIUM 1000 [USP'U]/ML
INJECTION, SOLUTION INTRAVENOUS; SUBCUTANEOUS
Status: DISCONTINUED | OUTPATIENT
Start: 2024-02-13 | End: 2024-02-13 | Stop reason: HOSPADM

## 2024-02-13 RX ORDER — TIROFIBAN HYDROCHLORIDE 50 UG/ML
INJECTION INTRAVENOUS
Status: DISCONTINUED | OUTPATIENT
Start: 2024-02-13 | End: 2024-02-15 | Stop reason: HOSPADM

## 2024-02-13 RX ORDER — INSULIN ASPART 100 [IU]/ML
0-5 INJECTION, SOLUTION INTRAVENOUS; SUBCUTANEOUS
Status: DISCONTINUED | OUTPATIENT
Start: 2024-02-13 | End: 2024-02-13

## 2024-02-13 RX ORDER — PRAVASTATIN SODIUM 20 MG/1
80 TABLET ORAL DAILY
Status: DISCONTINUED | OUTPATIENT
Start: 2024-02-14 | End: 2024-02-15 | Stop reason: HOSPADM

## 2024-02-13 RX ORDER — DIPHENHYDRAMINE HYDROCHLORIDE 50 MG/ML
INJECTION INTRAMUSCULAR; INTRAVENOUS
Status: DISCONTINUED | OUTPATIENT
Start: 2024-02-13 | End: 2024-02-13 | Stop reason: HOSPADM

## 2024-02-13 RX ORDER — TIROFIBAN HYDROCHLORIDE 50 UG/ML
0.15 INJECTION INTRAVENOUS CONTINUOUS
Status: DISCONTINUED | OUTPATIENT
Start: 2024-02-13 | End: 2024-02-14

## 2024-02-13 RX ORDER — ONDANSETRON HYDROCHLORIDE 2 MG/ML
INJECTION, SOLUTION INTRAVENOUS
Status: DISCONTINUED | OUTPATIENT
Start: 2024-02-13 | End: 2024-02-13 | Stop reason: HOSPADM

## 2024-02-13 RX ORDER — PRAVASTATIN SODIUM 20 MG/1
20 TABLET ORAL DAILY
Status: DISCONTINUED | OUTPATIENT
Start: 2024-02-13 | End: 2024-02-13

## 2024-02-13 RX ORDER — FENTANYL CITRATE 50 UG/ML
INJECTION, SOLUTION INTRAMUSCULAR; INTRAVENOUS
Status: DISCONTINUED | OUTPATIENT
Start: 2024-02-13 | End: 2024-02-13 | Stop reason: HOSPADM

## 2024-02-13 RX ORDER — VERAPAMIL HYDROCHLORIDE 2.5 MG/ML
INJECTION, SOLUTION INTRAVENOUS
Status: DISCONTINUED | OUTPATIENT
Start: 2024-02-13 | End: 2024-02-13 | Stop reason: HOSPADM

## 2024-02-13 RX ADMIN — ASPIRIN 81 MG CHEWABLE TABLET 81 MG: 81 TABLET CHEWABLE at 08:02

## 2024-02-13 RX ADMIN — PRAVASTATIN SODIUM 20 MG: 20 TABLET ORAL at 08:02

## 2024-02-13 RX ADMIN — METOPROLOL TARTRATE 25 MG: 25 TABLET, FILM COATED ORAL at 08:02

## 2024-02-13 RX ADMIN — MUPIROCIN: 20 OINTMENT TOPICAL at 08:02

## 2024-02-13 RX ADMIN — INSULIN DETEMIR 10 UNITS: 100 INJECTION, SOLUTION SUBCUTANEOUS at 08:02

## 2024-02-13 RX ADMIN — DEXMEDETOMIDINE HYDROCHLORIDE 0.2 MCG/KG/HR: 4 INJECTION INTRAVENOUS at 06:02

## 2024-02-13 RX ADMIN — TICAGRELOR 90 MG: 90 TABLET ORAL at 08:02

## 2024-02-13 RX ADMIN — SODIUM CHLORIDE: 9 INJECTION, SOLUTION INTRAVENOUS at 08:02

## 2024-02-13 RX ADMIN — HEPARIN SODIUM 10 UNITS/KG/HR: 10000 INJECTION, SOLUTION INTRAVENOUS at 02:02

## 2024-02-13 RX ADMIN — INSULIN DETEMIR 10 UNITS: 100 INJECTION, SOLUTION SUBCUTANEOUS at 02:02

## 2024-02-13 NOTE — INTERVAL H&P NOTE
The patient has been examined and the H&P has been reviewed:    I concur with the findings and no changes have occurred since H&P was written.  HAS NSTEMI FOR LHC/PTCA  Procedure risks, benefits and alternative options discussed and understood by patient/family.  I have explained the risks, benefits , and alternatives of the procedure in detail.the patient voices understanding and all questions have been answered.the patient agrees to proceed as planned.         Active Hospital Problems    Diagnosis  POA    *NSTEMI (non-ST elevated myocardial infarction) [I21.4]  Yes    Hypertension [I10]  Yes    Hyperlipidemia associated with type 2 diabetes mellitus [E11.69, E78.5]  Yes    Type 2 diabetes mellitus [E11.9]  Yes      Resolved Hospital Problems   No resolved problems to display.

## 2024-02-13 NOTE — ASSESSMENT & PLAN NOTE
-Presents with typical CP symptoms/troponin elevation  -Consistent with NSTEMI  -Stable/CP free during exam  -Continue ASA, statin, heparin gtt  -No  BB given borderline BP  -Nitrates prn  -TTE pending  -[facility] today. All risks, benefits, and treatment alternatives explained to patient in detail. All questions answered. He has agreed to proceed.

## 2024-02-13 NOTE — SUBJECTIVE & OBJECTIVE
"Past Medical History:   Diagnosis Date    Hypertension     Type 2 diabetes mellitus without complications 2012    BS didn't check 11/28/2022       Past Surgical History:   Procedure Laterality Date    HERNIA REPAIR         Review of patient's allergies indicates:   Allergen Reactions    Tomato        No current facility-administered medications on file prior to encounter.     Current Outpatient Medications on File Prior to Encounter   Medication Sig    insulin (LANTUS SOLOSTAR U-100 INSULIN) glargine 100 units/mL SubQ pen Inject 10 Units into the skin every evening.    simvastatin (ZOCOR) 10 MG tablet Take 1 tablet (10 mg total) by mouth every evening.    blood sugar diagnostic Strp To check BG 1-2 times daily, to use with insurance preferred meter    blood-glucose meter kit To check BG 1-2 times daily, to use with insurance preferred meter    cyclobenzaprine (FLEXERIL) 10 MG tablet Take 1 tablet (10 mg total) by mouth 3 (three) times daily.    lancets Misc To check BG 1-2 times daily, to use with insurance preferred meter    meloxicam (MOBIC) 7.5 MG tablet Take 1 tablet (7.5 mg total) by mouth once daily.    pen needle, diabetic 31 gauge x 5/16" Ndle to be used with lantus daily     Family History       Problem Relation (Age of Onset)    Diabetes Mother    Hypertension Mother          Tobacco Use    Smoking status: Former     Types: Cigarettes    Smokeless tobacco: Never   Substance and Sexual Activity    Alcohol use: Yes    Drug use: Not on file    Sexual activity: Not on file     Review of Systems   All other systems reviewed and are negative.    Objective:     Vital Signs (Most Recent):  Temp: 97.9 °F (36.6 °C) (02/12/24 2333)  Pulse: 66 (02/12/24 2333)  Resp: 16 (02/12/24 2333)  BP: (!) 90/58 (02/12/24 2333)  SpO2: 95 % (02/12/24 2333) Vital Signs (24h Range):  Temp:  [96.4 °F (35.8 °C)-98 °F (36.7 °C)] 97.9 °F (36.6 °C)  Pulse:  [66-91] 66  Resp:  [13-22] 16  SpO2:  [94 %-100 %] 95 %  BP: ()/() " 90/58     Weight: 84.3 kg (185 lb 13.6 oz)  Body mass index is 26.67 kg/m².     Physical Exam  Vitals reviewed.   Constitutional:       General: He is not in acute distress.     Appearance: Normal appearance. He is obese. He is not ill-appearing, toxic-appearing or diaphoretic.   HENT:      Head: Normocephalic and atraumatic.      Right Ear: External ear normal.      Left Ear: External ear normal.      Nose: Nose normal. No congestion or rhinorrhea.      Mouth/Throat:      Mouth: Mucous membranes are moist.      Pharynx: Oropharynx is clear. No oropharyngeal exudate or posterior oropharyngeal erythema.   Eyes:      General: No scleral icterus.     Extraocular Movements: Extraocular movements intact.      Conjunctiva/sclera: Conjunctivae normal.      Pupils: Pupils are equal, round, and reactive to light.   Neck:      Vascular: No carotid bruit.   Cardiovascular:      Rate and Rhythm: Normal rate and regular rhythm.      Pulses: Normal pulses.      Heart sounds: Normal heart sounds. No murmur heard.     No friction rub. No gallop.   Pulmonary:      Effort: Pulmonary effort is normal. No respiratory distress.      Breath sounds: Normal breath sounds. No stridor. No wheezing, rhonchi or rales.   Chest:      Chest wall: No tenderness.   Abdominal:      General: Abdomen is flat. Bowel sounds are normal. There is no distension.      Palpations: Abdomen is soft. There is no mass.      Tenderness: There is no abdominal tenderness. There is no guarding or rebound.      Hernia: No hernia is present.   Musculoskeletal:         General: No swelling, tenderness, deformity or signs of injury. Normal range of motion.      Cervical back: Normal range of motion and neck supple. No rigidity or tenderness.   Lymphadenopathy:      Cervical: No cervical adenopathy.   Skin:     General: Skin is warm and dry.      Capillary Refill: Capillary refill takes less than 2 seconds.      Coloration: Skin is not jaundiced or pale.      Findings:  No bruising, erythema, lesion or rash.   Neurological:      General: No focal deficit present.      Mental Status: He is alert and oriented to person, place, and time. Mental status is at baseline.      Cranial Nerves: No cranial nerve deficit.      Sensory: No sensory deficit.      Motor: No weakness.      Coordination: Coordination normal.   Psychiatric:         Mood and Affect: Mood normal.         Behavior: Behavior normal.         Thought Content: Thought content normal.         Judgment: Judgment normal.              CRANIAL NERVES     CN III, IV, VI   Pupils are equal, round, and reactive to light.       Significant Labs: All pertinent labs within the past 24 hours have been reviewed.    Significant Imaging: I have reviewed all pertinent imaging results/findings within the past 24 hours.    LABS:  Recent Results (from the past 24 hour(s))   EKG 12-lead    Collection Time: 02/12/24  2:53 PM   Result Value Ref Range    QRS Duration 84 ms    [facility] QTC Calculation 444 ms   CBC auto differential    Collection Time: 02/12/24  5:34 PM   Result Value Ref Range    WBC 10.99 3.90 - 12.70 K/uL    RBC 5.47 4.60 - 6.20 M/uL    Hemoglobin 14.3 14.0 - 18.0 g/dL    Hematocrit 44.0 40.0 - 54.0 %    MCV 80 (L) 82 - 98 fL    MCH 26.1 (L) 27.0 - 31.0 pg    MCHC 32.5 32.0 - 36.0 g/dL    RDW 13.8 11.5 - 14.5 %    Platelets 286 150 - 450 K/uL    MPV 10.3 9.2 - 12.9 fL    Immature Granulocytes 0.4 0.0 - 0.5 %    Gran # (ANC) 8.6 (H) 1.8 - 7.7 K/uL    Immature Grans (Abs) 0.04 0.00 - 0.04 K/uL    Lymph # 1.5 1.0 - 4.8 K/uL    Mono # 0.9 0.3 - 1.0 K/uL    Eos # 0.0 0.0 - 0.5 K/uL    Baso # 0.01 0.00 - 0.20 K/uL    nRBC 0 0 /100 WBC    Gran % 78.3 (H) 38.0 - 73.0 %    Lymph % 13.3 (L) 18.0 - 48.0 %    Mono % 7.7 4.0 - 15.0 %    Eosinophil % 0.2 0.0 - 8.0 %    Basophil % 0.1 0.0 - 1.9 %    Differential Method Automated    Comprehensive metabolic panel    Collection Time: 02/12/24  5:34 PM   Result Value Ref Range    Sodium 139 136 - 145  mmol/L    Potassium 4.1 3.5 - 5.1 mmol/L    Chloride 105 95 - 110 mmol/L    CO2 20 (L) 23 - 29 mmol/L    Glucose 243 (H) 70 - 110 mg/dL    BUN 15 8 - 23 mg/dL    Creatinine 1.1 0.5 - 1.4 mg/dL    Calcium 9.3 8.7 - 10.5 mg/dL    Total Protein 7.7 6.0 - 8.4 g/dL    Albumin 3.9 3.5 - 5.2 g/dL    Total Bilirubin 0.3 0.1 - 1.0 mg/dL    Alkaline Phosphatase 96 55 - 135 U/L    AST 25 10 - 40 U/L    ALT 17 10 - 44 U/L    eGFR >60 >60 mL/min/1.73 m^2    Anion Gap 14 8 - 16 mmol/L   Troponin I #1    Collection Time: 02/12/24  5:34 PM   Result Value Ref Range    Troponin I 1.063 (H) 0.000 - 0.026 ng/mL   BNP    Collection Time: 02/12/24  5:34 PM   Result Value Ref Range    BNP 90 0 - 99 pg/mL   Protime-INR    Collection Time: 02/12/24  6:31 PM   Result Value Ref Range    Prothrombin Time 11.3 9.0 - 12.5 sec    INR 1.0 0.8 - 1.2   Troponin I #2    Collection Time: 02/12/24  8:03 PM   Result Value Ref Range    Troponin I 1.370 (H) 0.000 - 0.026 ng/mL   Lipid panel    Collection Time: 02/12/24  8:37 PM   Result Value Ref Range    Cholesterol 154 120 - 199 mg/dL    Triglycerides 59 30 - 150 mg/dL    HDL 43 40 - 75 mg/dL    LDL Cholesterol 99.2 63.0 - 159.0 mg/dL    HDL/Cholesterol Ratio 27.9 20.0 - 50.0 %    Total Cholesterol/HDL Ratio 3.6 2.0 - 5.0    Non-HDL Cholesterol 111 mg/dL   Hemoglobin A1c    Collection Time: 02/12/24  8:37 PM   Result Value Ref Range    Hemoglobin A1C 11.7 (H) 4.0 - 5.6 %    Estimated Avg Glucose 289 (H) 68 - 131 mg/dL   Type & Screen    Collection Time: 02/12/24 11:18 PM   Result Value Ref Range    Group & Rh O POS     Indirect Lyubov NEG     Specimen Outdate 02/15/2024 23:59    Troponin I    Collection Time: 02/12/24 11:18 PM   Result Value Ref Range    Troponin I 4.367 (H) 0.000 - 0.026 ng/mL   CBC auto differential    Collection Time: 02/13/24  1:25 AM   Result Value Ref Range    WBC 8.53 3.90 - 12.70 K/uL    RBC 4.75 4.60 - 6.20 M/uL    Hemoglobin 12.4 (L) 14.0 - 18.0 g/dL    Hematocrit 38.0 (L)  40.0 - 54.0 %    MCV 80 (L) 82 - 98 fL    MCH 26.1 (L) 27.0 - 31.0 pg    MCHC 32.6 32.0 - 36.0 g/dL    RDW 13.8 11.5 - 14.5 %    Platelets 255 150 - 450 K/uL    MPV 10.4 9.2 - 12.9 fL    Immature Granulocytes 0.4 0.0 - 0.5 %    Gran # (ANC) 5.4 1.8 - 7.7 K/uL    Immature Grans (Abs) 0.03 0.00 - 0.04 K/uL    Lymph # 2.1 1.0 - 4.8 K/uL    Mono # 0.9 0.3 - 1.0 K/uL    Eos # 0.1 0.0 - 0.5 K/uL    Baso # 0.01 0.00 - 0.20 K/uL    nRBC 0 0 /100 WBC    Gran % 63.6 38.0 - 73.0 %    Lymph % 24.5 18.0 - 48.0 %    Mono % 10.7 4.0 - 15.0 %    Eosinophil % 0.7 0.0 - 8.0 %    Basophil % 0.1 0.0 - 1.9 %    Differential Method Automated    Troponin I    Collection Time: 02/13/24  1:28 AM   Result Value Ref Range    Troponin I 5.284 (H) 0.000 - 0.026 ng/mL   APTT    Collection Time: 02/13/24  1:28 AM   Result Value Ref Range    aPTT 72.6 (H) 21.0 - 32.0 sec   POCT glucose    Collection Time: 02/13/24  2:00 AM   Result Value Ref Range    POCT Glucose 203 (H) 70 - 110 mg/dL       RADIOLOGY  X-Ray Chest AP Portable    Result Date: 2/12/2024  EXAMINATION: XR CHEST AP PORTABLE CLINICAL HISTORY: chest pain; TECHNIQUE: Single frontal view of the chest was performed. COMPARISON: None FINDINGS: The lungs are clear, with normal appearance of pulmonary vasculature and no pleural effusion or pneumothorax. The cardiac silhouette is normal in size. The hilar and mediastinal contours are unremarkable. Bones are intact.     No acute abnormality. Electronically signed by: Felice Colmenares Date:    02/12/2024 Time:    18:29      EKG    MICROBIOLOGY    MDM

## 2024-02-13 NOTE — SUBJECTIVE & OBJECTIVE
"Past Medical History:   Diagnosis Date    Hypertension     Type 2 diabetes mellitus without complications 2012    BS didn't check 11/28/2022       Past Surgical History:   Procedure Laterality Date    HERNIA REPAIR         Review of patient's allergies indicates:   Allergen Reactions    Tomato        No current facility-administered medications on file prior to encounter.     Current Outpatient Medications on File Prior to Encounter   Medication Sig    insulin (LANTUS SOLOSTAR U-100 INSULIN) glargine 100 units/mL SubQ pen Inject 10 Units into the skin every evening.    simvastatin (ZOCOR) 10 MG tablet Take 1 tablet (10 mg total) by mouth every evening.    blood sugar diagnostic Strp To check BG 1-2 times daily, to use with insurance preferred meter    blood-glucose meter kit To check BG 1-2 times daily, to use with insurance preferred meter    cyclobenzaprine (FLEXERIL) 10 MG tablet Take 1 tablet (10 mg total) by mouth 3 (three) times daily.    lancets Misc To check BG 1-2 times daily, to use with insurance preferred meter    meloxicam (MOBIC) 7.5 MG tablet Take 1 tablet (7.5 mg total) by mouth once daily.    pen needle, diabetic 31 gauge x 5/16" Ndle to be used with lantus daily     Family History       Problem Relation (Age of Onset)    Diabetes Mother    Hypertension Mother          Tobacco Use    Smoking status: Former     Types: Cigarettes    Smokeless tobacco: Never   Substance and Sexual Activity    Alcohol use: Yes    Drug use: Not on file    Sexual activity: Not on file     Review of Systems   Constitutional: Positive for malaise/fatigue.   HENT:          Jaw pain     Eyes: Negative.    Cardiovascular:  Positive for chest pain (resolved at time of exam).   Respiratory: Negative.     Endocrine: Negative.    Hematologic/Lymphatic: Negative.    Skin: Negative.    Musculoskeletal:         L shoulder pain     Gastrointestinal: Negative.    Genitourinary: Negative.    Neurological: Negative.  "   Psychiatric/Behavioral: Negative.     Allergic/Immunologic: Negative.      Objective:     Vital Signs (Most Recent):  Temp: 97.9 °F (36.6 °C) (02/13/24 0733)  Pulse: 68 (02/13/24 0736)  Resp: 18 (02/13/24 0733)  BP: (!) 95/55 (02/13/24 0736)  SpO2: 96 % (02/13/24 0733) Vital Signs (24h Range):  Temp:  [96.4 °F (35.8 °C)-98.3 °F (36.8 °C)] 97.9 °F (36.6 °C)  Pulse:  [66-91] 68  Resp:  [12-22] 18  SpO2:  [94 %-100 %] 96 %  BP: ()/() 95/55     Weight: 83.9 kg (185 lb)  Body mass index is 26.54 kg/m².    SpO2: 96 %       No intake or output data in the 24 hours ending 02/13/24 0930    Lines/Drains/Airways       Peripheral Intravenous Line  Duration                  Peripheral IV - Single Lumen 02/12/24 1734 20 G Right Forearm <1 day         Peripheral IV - Single Lumen 02/12/24 1958 20 G Anterior;Left Forearm <1 day                     Physical Exam  Vitals and nursing note reviewed.   Constitutional:       General: He is not in acute distress.     Appearance: Normal appearance. He is well-developed. He is not diaphoretic.   HENT:      Head: Normocephalic and atraumatic.   Eyes:      General:         Right eye: No discharge.         Left eye: No discharge.      Pupils: Pupils are equal, round, and reactive to light.   Cardiovascular:      Rate and Rhythm: Normal rate and regular rhythm.      Heart sounds: Normal heart sounds, S1 normal and S2 normal. No murmur heard.  Pulmonary:      Effort: Pulmonary effort is normal. No respiratory distress.      Breath sounds: Normal breath sounds. No wheezing or rales.   Abdominal:      General: There is no distension.   Musculoskeletal:      Right lower leg: No edema.      Left lower leg: No edema.   Skin:     General: Skin is warm and dry.      Findings: No erythema.   Neurological:      General: No focal deficit present.      Mental Status: He is alert and oriented to person, place, and time.   Psychiatric:         Mood and Affect: Mood normal.         Behavior:  Behavior normal.         Thought Content: Thought content normal.          Significant Labs: CMP   Recent Labs   Lab 02/12/24  1734 02/13/24  0757    139   K 4.1 3.8    107   CO2 20* 21*   * 194*   BUN 15 17   CREATININE 1.1 0.9   CALCIUM 9.3 8.8   PROT 7.7  --    ALBUMIN 3.9  --    BILITOT 0.3  --    ALKPHOS 96  --    AST 25  --    ALT 17  --    ANIONGAP 14 11   , CBC   Recent Labs   Lab 02/12/24  1734 02/13/24  0125   WBC 10.99 8.53   HGB 14.3 12.4*   HCT 44.0 38.0*    255   , Troponin   Recent Labs   Lab 02/12/24  2318 02/13/24  0128 02/13/24  0757   TROPONINI 4.367* 5.284* 7.957*   , and All pertinent lab results from the last 24 hours have been reviewed.    Significant Imaging: Echocardiogram: Transthoracic echo (TTE) complete (Cupid Only):   Results for orders placed or performed during the hospital encounter of 02/12/24   Echo   Result Value Ref Range    BSA 2.04 m2   , EKG: Reviewed CXR reviewed

## 2024-02-13 NOTE — SUBJECTIVE & OBJECTIVE
Review of Systems   All other systems reviewed and are negative.    Objective:     Vital Signs (Most Recent):  Temp: 97.9 °F (36.6 °C) (02/13/24 0733)  Pulse: 77 (02/13/24 0900)  Resp: 18 (02/13/24 0733)  BP: (!) 95/55 (02/13/24 0736)  SpO2: 96 % (02/13/24 0733) Vital Signs (24h Range):  Temp:  [96.4 °F (35.8 °C)-98.3 °F (36.8 °C)] 97.9 °F (36.6 °C)  Pulse:  [66-91] 77  Resp:  [12-22] 18  SpO2:  [94 %-100 %] 96 %  BP: ()/() 95/55     Weight: 83.9 kg (185 lb)  Body mass index is 26.54 kg/m².  No intake or output data in the 24 hours ending 02/13/24 1117      Physical Exam  Vitals and nursing note reviewed.   Constitutional:       General: He is not in acute distress.     Appearance: Normal appearance. He is normal weight.   Cardiovascular:      Pulses: Normal pulses.      Heart sounds: Normal heart sounds.   Pulmonary:      Effort: Pulmonary effort is normal. No respiratory distress.      Breath sounds: Normal breath sounds. No wheezing, rhonchi or rales.   Abdominal:      General: Abdomen is flat. Bowel sounds are normal. There is no distension.      Palpations: Abdomen is soft.      Tenderness: There is no abdominal tenderness. There is no guarding.   Neurological:      General: No focal deficit present.      Mental Status: He is alert and oriented to person, place, and time.   Psychiatric:         Mood and Affect: Mood normal.         Behavior: Behavior normal.             Significant Labs: All pertinent labs within the past 24 hours have been reviewed.  Recent Lab Results  (Last 5 results in the past 24 hours)        02/13/24  0906   02/13/24  0757   02/13/24  0446   02/13/24  0200   02/13/24  0128        Anion Gap   11             Ao root annulus 3.24               Ascending aorta 3.36               Ao peak missy 1.12               Ao VTI 20.20               PTT   50.4  Comment: Refer to local heparin nomogram for intensity/dose specific   therapeutic   range.         72.6  Comment: Refer to local  heparin nomogram for intensity/dose specific   therapeutic   range.         AV valve area 2.83               RODERICK by Velocity Ratio 2.45               AV mean gradient 3               AV index (prosthetic) 0.92               AV peak gradient 5               AV Velocity Ratio 0.79               Baso #               Basophil %               BSA 2.04               BUN   17             Calcium   8.8             Chloride   107             CO2   21             Creatinine   0.9             Left Ventricle Relative Wall Thickness 0.69               Differential Method               E/A ratio 0.79               E/E' ratio 9.23               eGFR   >60             Eos #               Eos %               E wave deceleration time 287.30               FS 32               Glucose   194             Gran # (ANC)               Gran %               Hematocrit               Hemoglobin               Immature Grans (Abs)               Immature Granulocytes               IVC diameter 1.80               IVRT 91.34               IVSd 1.54               LA WIDTH 3.3               Left Atrium Major Axis 4.99               Left Atrium Minor Axis 4.84               LA size 3.15               LA volume 43.42               LA vol index 21.5               LVOT area 3.1               LV LATERAL E/E' RATIO 7.50               LV SEPTAL E/E' RATIO 12.00               LV EDV BP 66.82               LV Diastolic Volume Index 33.08               LVIDd 3.92               LVIDs 2.65               LV mass 214.41               LV Mass Index 106               Left Ventricular Outflow Tract Mean Gradient 1.99               Left Ventricular Outflow Tract Mean Velocity 0.69               LVOT diameter 1.98               LVOT peak missy 0.89               LVOT stroke volume 57.24               LVOT peak VTI 18.60               LV ESV BP 25.82               LV Systolic Volume Index 12.8               Lymph #               Lymph %               MCH               MCHC                MCV               Mean e' 0.07               Mono #               Mono %               MPV               MV valve area p 1/2 method 2.64               MV Peak A Scott 0.76               MV Peak E Scott 0.60               MV stenosis pressure 1/2 time 83.32               nRBC               Platelet Count               POCT Glucose     202   203         Potassium   3.8             PV mean gradient 1               Posterior Wall 1.36               RA Major Axis 3.85               Est. RA pres 3               RA Width 2.7               RBC               RDW               RV TB RVSP 5               RVOT peak scott 0.62               RVOT peak VTI 12.8               Sodium   139             STJ 3.38               TAPSE 1.81               TDI SEPTAL 0.05               TDI LATERAL 0.08               Triscuspid Valve Regurgitation Peak Gradient 12               TR Max Scott 1.75               Troponin I   7.957  Comment: The reference interval for Troponin I represents the 99th percentile   cutoff   for our facility and is consistent with 3rd generation assay   performance.         5.284  Comment: The reference interval for Troponin I represents the 99th percentile   cutoff   for our facility and is consistent with 3rd generation assay   performance.         TV mean gradient 12               TV resting pulmonary artery pressure 15               WBC               ZLVIDD -4.18               ZLVIDS -2.53                                      Significant Imaging: I have reviewed all pertinent imaging results/findings within the past 24 hours.    X-Ray Chest AP Portable   Final Result      No acute abnormality.         Electronically signed by: Felice Colmenares   Date:    02/12/2024   Time:    18:29

## 2024-02-13 NOTE — H&P
[facility]  History & Physical    Patient Name: Tramaine Resendiz  MRN: [id number]  Patient Class: OP- Observation  Admission Date: 2/12/2024  Attending Physician: Kaleb Chang MD   Primary Care Provider: Jeane Doyle MD         Patient information was obtained from patient, past medical records, and ER records.     Subjective:     Principal Problem:Chest pain    Chief Complaint:   Chief Complaint   Patient presents with    Chest Pain     Pt c/o chest pain x several hours.  He was seen by his doctor's NP and was sent here.  He denies history of heart problems.  He also c/o SOB and nausea.          HPI: Tramaine Resendiz is a 68 y.o. male with a PMH  has a past medical history of Hypertension and Type 2 diabetes mellitus without complications (2012). who presented to the ED for further evaluation of acute onset chest pain prior to arrival.  Patient stated he was at local urgent care for evaluation of hip pain when he suddenly developed hot flashes followed by substernal chest pain radiating to his left shoulder, shortness of breath, lightheadedness/dizziness, and nausea.  Pain was described as pressure/sharp/stabbing in nature, constant, lasting approximately 10-20 minutes in duration, subsided without intervention, rated 7/10 in severity at its worst, currently chest pain-free, and reported no known alleviating or aggravating factors noted.  He reported no other associated symptoms and denied experiencing similar symptoms previously.  He denied significant cardiac history including negative MI, CHF, or arrhythmias, and reported being in his usual state of health prior to onset of symptoms.  All other review of systems negative except as noted above.  Initial workup in the ED revealed patient to be hypotensive, hyperglycemic, and troponin elevated at 1.063 with repeat 1.370.  Patient initiated on heparin drip and admitted to Hospital Medicine under observation for  "continued medical management and treatment of NSTEMI.    PCP: Jeane Doyle      Past Medical History:   Diagnosis Date    Hypertension     Type 2 diabetes mellitus without complications 2012    BS didn't check 11/28/2022       Past Surgical History:   Procedure Laterality Date    HERNIA REPAIR         Review of patient's allergies indicates:   Allergen Reactions    Tomato        No current facility-administered medications on file prior to encounter.     Current Outpatient Medications on File Prior to Encounter   Medication Sig    insulin (LANTUS SOLOSTAR U-100 INSULIN) glargine 100 units/mL SubQ pen Inject 10 Units into the skin every evening.    simvastatin (ZOCOR) 10 MG tablet Take 1 tablet (10 mg total) by mouth every evening.    blood sugar diagnostic Strp To check BG 1-2 times daily, to use with insurance preferred meter    blood-glucose meter kit To check BG 1-2 times daily, to use with insurance preferred meter    cyclobenzaprine (FLEXERIL) 10 MG tablet Take 1 tablet (10 mg total) by mouth 3 (three) times daily.    lancets Misc To check BG 1-2 times daily, to use with insurance preferred meter    meloxicam (MOBIC) 7.5 MG tablet Take 1 tablet (7.5 mg total) by mouth once daily.    pen needle, diabetic 31 gauge x 5/16" Ndle to be used with lantus daily     Family History       Problem Relation (Age of Onset)    Diabetes Mother    Hypertension Mother          Tobacco Use    Smoking status: Former     Types: Cigarettes    Smokeless tobacco: Never   Substance and Sexual Activity    Alcohol use: Yes    Drug use: Not on file    Sexual activity: Not on file     Review of Systems   All other systems reviewed and are negative.    Objective:     Vital Signs (Most Recent):  Temp: 97.9 °F (36.6 °C) (02/12/24 2333)  Pulse: 66 (02/12/24 2333)  Resp: 16 (02/12/24 2333)  BP: (!) 90/58 (02/12/24 2333)  SpO2: 95 % (02/12/24 2333) Vital Signs (24h Range):  Temp:  [96.4 °F (35.8 °C)-98 °F (36.7 °C)] 97.9 °F (36.6 " °C)  Pulse:  [66-91] 66  Resp:  [13-22] 16  SpO2:  [94 %-100 %] 95 %  BP: ()/() 90/58     Weight: 84.3 kg (185 lb 13.6 oz)  Body mass index is 26.67 kg/m².     Physical Exam  Vitals reviewed.   Constitutional:       General: He is not in acute distress.     Appearance: Normal appearance. He is obese. He is not ill-appearing, toxic-appearing or diaphoretic.   HENT:      Head: Normocephalic and atraumatic.      Right Ear: External ear normal.      Left Ear: External ear normal.      Nose: Nose normal. No congestion or rhinorrhea.      Mouth/Throat:      Mouth: Mucous membranes are moist.      Pharynx: Oropharynx is clear. No oropharyngeal exudate or posterior oropharyngeal erythema.   Eyes:      General: No scleral icterus.     Extraocular Movements: Extraocular movements intact.      Conjunctiva/sclera: Conjunctivae normal.      Pupils: Pupils are equal, round, and reactive to light.   Neck:      Vascular: No carotid bruit.   Cardiovascular:      Rate and Rhythm: Normal rate and regular rhythm.      Pulses: Normal pulses.      Heart sounds: Normal heart sounds. No murmur heard.     No friction rub. No gallop.   Pulmonary:      Effort: Pulmonary effort is normal. No respiratory distress.      Breath sounds: Normal breath sounds. No stridor. No wheezing, rhonchi or rales.   Chest:      Chest wall: No tenderness.   Abdominal:      General: Abdomen is flat. Bowel sounds are normal. There is no distension.      Palpations: Abdomen is soft. There is no mass.      Tenderness: There is no abdominal tenderness. There is no guarding or rebound.      Hernia: No hernia is present.   Musculoskeletal:         General: No swelling, tenderness, deformity or signs of injury. Normal range of motion.      Cervical back: Normal range of motion and neck supple. No rigidity or tenderness.   Lymphadenopathy:      Cervical: No cervical adenopathy.   Skin:     General: Skin is warm and dry.      Capillary Refill: Capillary refill  takes less than 2 seconds.      Coloration: Skin is not jaundiced or pale.      Findings: No bruising, erythema, lesion or rash.   Neurological:      General: No focal deficit present.      Mental Status: He is alert and oriented to person, place, and time. Mental status is at baseline.      Cranial Nerves: No cranial nerve deficit.      Sensory: No sensory deficit.      Motor: No weakness.      Coordination: Coordination normal.   Psychiatric:         Mood and Affect: Mood normal.         Behavior: Behavior normal.         Thought Content: Thought content normal.         Judgment: Judgment normal.              CRANIAL NERVES     CN III, IV, VI   Pupils are equal, round, and reactive to light.       Significant Labs: All pertinent labs within the past 24 hours have been reviewed.    Significant Imaging: I have reviewed all pertinent imaging results/findings within the past 24 hours.    LABS:  Recent Results (from the past 24 hour(s))   EKG 12-lead    Collection Time: 02/12/24  2:53 PM   Result Value Ref Range    QRS Duration 84 ms    [facility] QTC Calculation 444 ms   CBC auto differential    Collection Time: 02/12/24  5:34 PM   Result Value Ref Range    WBC 10.99 3.90 - 12.70 K/uL    RBC 5.47 4.60 - 6.20 M/uL    Hemoglobin 14.3 14.0 - 18.0 g/dL    Hematocrit 44.0 40.0 - 54.0 %    MCV 80 (L) 82 - 98 fL    MCH 26.1 (L) 27.0 - 31.0 pg    MCHC 32.5 32.0 - 36.0 g/dL    RDW 13.8 11.5 - 14.5 %    Platelets 286 150 - 450 K/uL    MPV 10.3 9.2 - 12.9 fL    Immature Granulocytes 0.4 0.0 - 0.5 %    Gran # (ANC) 8.6 (H) 1.8 - 7.7 K/uL    Immature Grans (Abs) 0.04 0.00 - 0.04 K/uL    Lymph # 1.5 1.0 - 4.8 K/uL    Mono # 0.9 0.3 - 1.0 K/uL    Eos # 0.0 0.0 - 0.5 K/uL    Baso # 0.01 0.00 - 0.20 K/uL    nRBC 0 0 /100 WBC    Gran % 78.3 (H) 38.0 - 73.0 %    Lymph % 13.3 (L) 18.0 - 48.0 %    Mono % 7.7 4.0 - 15.0 %    Eosinophil % 0.2 0.0 - 8.0 %    Basophil % 0.1 0.0 - 1.9 %    Differential Method Automated    Comprehensive metabolic  panel    Collection Time: 02/12/24  5:34 PM   Result Value Ref Range    Sodium 139 136 - 145 mmol/L    Potassium 4.1 3.5 - 5.1 mmol/L    Chloride 105 95 - 110 mmol/L    CO2 20 (L) 23 - 29 mmol/L    Glucose 243 (H) 70 - 110 mg/dL    BUN 15 8 - 23 mg/dL    Creatinine 1.1 0.5 - 1.4 mg/dL    Calcium 9.3 8.7 - 10.5 mg/dL    Total Protein 7.7 6.0 - 8.4 g/dL    Albumin 3.9 3.5 - 5.2 g/dL    Total Bilirubin 0.3 0.1 - 1.0 mg/dL    Alkaline Phosphatase 96 55 - 135 U/L    AST 25 10 - 40 U/L    ALT 17 10 - 44 U/L    eGFR >60 >60 mL/min/1.73 m^2    Anion Gap 14 8 - 16 mmol/L   Troponin I #1    Collection Time: 02/12/24  5:34 PM   Result Value Ref Range    Troponin I 1.063 (H) 0.000 - 0.026 ng/mL   BNP    Collection Time: 02/12/24  5:34 PM   Result Value Ref Range    BNP 90 0 - 99 pg/mL   Protime-INR    Collection Time: 02/12/24  6:31 PM   Result Value Ref Range    Prothrombin Time 11.3 9.0 - 12.5 sec    INR 1.0 0.8 - 1.2   Troponin I #2    Collection Time: 02/12/24  8:03 PM   Result Value Ref Range    Troponin I 1.370 (H) 0.000 - 0.026 ng/mL   Lipid panel    Collection Time: 02/12/24  8:37 PM   Result Value Ref Range    Cholesterol 154 120 - 199 mg/dL    Triglycerides 59 30 - 150 mg/dL    HDL 43 40 - 75 mg/dL    LDL Cholesterol 99.2 63.0 - 159.0 mg/dL    HDL/Cholesterol Ratio 27.9 20.0 - 50.0 %    Total Cholesterol/HDL Ratio 3.6 2.0 - 5.0    Non-HDL Cholesterol 111 mg/dL   Hemoglobin A1c    Collection Time: 02/12/24  8:37 PM   Result Value Ref Range    Hemoglobin A1C 11.7 (H) 4.0 - 5.6 %    Estimated Avg Glucose 289 (H) 68 - 131 mg/dL   Type & Screen    Collection Time: 02/12/24 11:18 PM   Result Value Ref Range    Group & Rh O POS     Indirect Lyubov NEG     Specimen Outdate 02/15/2024 23:59    Troponin I    Collection Time: 02/12/24 11:18 PM   Result Value Ref Range    Troponin I 4.367 (H) 0.000 - 0.026 ng/mL   CBC auto differential    Collection Time: 02/13/24  1:25 AM   Result Value Ref Range    WBC 8.53 3.90 - 12.70 K/uL     RBC 4.75 4.60 - 6.20 M/uL    Hemoglobin 12.4 (L) 14.0 - 18.0 g/dL    Hematocrit 38.0 (L) 40.0 - 54.0 %    MCV 80 (L) 82 - 98 fL    MCH 26.1 (L) 27.0 - 31.0 pg    MCHC 32.6 32.0 - 36.0 g/dL    RDW 13.8 11.5 - 14.5 %    Platelets 255 150 - 450 K/uL    MPV 10.4 9.2 - 12.9 fL    Immature Granulocytes 0.4 0.0 - 0.5 %    Gran # (ANC) 5.4 1.8 - 7.7 K/uL    Immature Grans (Abs) 0.03 0.00 - 0.04 K/uL    Lymph # 2.1 1.0 - 4.8 K/uL    Mono # 0.9 0.3 - 1.0 K/uL    Eos # 0.1 0.0 - 0.5 K/uL    Baso # 0.01 0.00 - 0.20 K/uL    nRBC 0 0 /100 WBC    Gran % 63.6 38.0 - 73.0 %    Lymph % 24.5 18.0 - 48.0 %    Mono % 10.7 4.0 - 15.0 %    Eosinophil % 0.7 0.0 - 8.0 %    Basophil % 0.1 0.0 - 1.9 %    Differential Method Automated    Troponin I    Collection Time: 02/13/24  1:28 AM   Result Value Ref Range    Troponin I 5.284 (H) 0.000 - 0.026 ng/mL   APTT    Collection Time: 02/13/24  1:28 AM   Result Value Ref Range    aPTT 72.6 (H) 21.0 - 32.0 sec   POCT glucose    Collection Time: 02/13/24  2:00 AM   Result Value Ref Range    POCT Glucose 203 (H) 70 - 110 mg/dL       RADIOLOGY  X-Ray Chest AP Portable    Result Date: 2/12/2024  EXAMINATION: XR CHEST AP PORTABLE CLINICAL HISTORY: chest pain; TECHNIQUE: Single frontal view of the chest was performed. COMPARISON: None FINDINGS: The lungs are clear, with normal appearance of pulmonary vasculature and no pleural effusion or pneumothorax. The cardiac silhouette is normal in size. The hilar and mediastinal contours are unremarkable. Bones are intact.     No acute abnormality. Electronically signed by: Felice Colmenares Date:    02/12/2024 Time:    18:29      EKG    MICROBIOLOGY    MDM    Assessment/Plan:     * Chest pain    NSTEMI (non-ST elevated myocardial infarction)  Patient presents with NSTEMI. Chest pain is currently controlled. JAVY score is 4. Patient is currently on NSTEMI Pathway.    EKG reviewed. Troponins reviewed and results noted-   Recent Labs   Lab 02/13/24  0128   TROPONINI  5.284*     Lipid panel reviewed and shows-     Lab Results   Component Value Date    LDLCALC 99.2 02/12/2024     Lab Results   Component Value Date    TRIG 59 02/12/2024     Medical management includes; Beta Blocker, Dual Anti-Platelet therapy, Anticoagulation, High Intensity Statin, and Nitrate Echo has not been performed. Latest ECHO results are as follows- No results found for this or any previous visit.    Consult for cardiac rehab is ordered. Patient counseled on lifestyle modifications- begin progressive daily aerobic exercise program, follow a low fat, low cholesterol diet, reduce salt in diet and cooking, reduce exposure to stress, improve dietary compliance, continue current medications, and continue current healthy lifestyle patterns. Cardiology is consulted. Plan of care pending with cardiology team. Continue to monitor patient closely and adjust therapy as needed.      Hypertension  Chronic, uncontrolled. Latest blood pressure and vitals reviewed-     Temp:  [96.4 °F (35.8 °C)-98 °F (36.7 °C)]   Pulse:  [66-91]   Resp:  [13-22]   BP: ()/()   SpO2:  [94 %-100 %] .   Home meds for hypertension were reviewed and noted below.     While in the hospital, will manage blood pressure as follows; Continue home antihypertensive regimen    Will utilize p.r.n. blood pressure medication only if patient's blood pressure greater than 160/100 and he develops symptoms such as worsening chest pain or shortness of breath.      Type 2 diabetes mellitus  Patient's FSGs are uncontrolled due to hyperglycemia on current medication regimen.  Last A1c reviewed-   Lab Results   Component Value Date    HGBA1C 11.7 (H) 02/12/2024     Most recent fingerstick glucose reviewed-   Recent Labs   Lab 02/13/24  0200   POCTGLUCOSE 203*     Current correctional scale  Low  titrate as needed  anti-hyperglycemic dose as follows-   Antihyperglycemics (From admission, onward)      Start     Stop Route Frequency Ordered    02/13/24  0257  insulin aspart U-100 pen 0-5 Units         -- SubQ Before meals & nightly PRN 02/13/24 0157    02/13/24 0200  insulin detemir U-100 (Levemir) pen 10 Units         -- SubQ Nightly 02/13/24 0157        Plan:  -SSI  -Accu-checks  -Hold oral hypoglycemics while patient is in the hospital  -Hypoglycemic protocol       Hyperlipidemia associated with type 2 diabetes mellitus  Patient is chronically on statin.will continue for now. Last Lipid Panel:   Lab Results   Component Value Date    CHOL 154 02/12/2024    HDL 43 02/12/2024    LDLCALC 99.2 02/12/2024    TRIG 59 02/12/2024    CHOLHDL 27.9 02/12/2024   Plan:  -Continue home medication  -low fat/low calorie diet        VTE Risk Mitigation (From admission, onward)           Ordered     IP VTE LOW RISK PATIENT  Once         02/12/24 2018     Reason for no Mechanical VTE Prophylaxis  Once        Question:  Reasons:  Answer:  Physician Provided (leave comment)  Comment:  on heparin drip    02/12/24 2018     Place sequential compression device  Until discontinued         02/12/24 2018     heparin 25,000 units in dextrose 5% (100 units/ml) IV bolus from bag LOW INTENSITY nomogram - [facility]  As needed (PRN)        Question:  Heparin Infusion Adjustment (DO NOT MODIFY ANSWER)  Answer:  \\[email or web address]\epic\Images\Pharmacy\HeparinInfusions\heparin LOW INTENSITY nomogram for [facility] [id number]    02/12/24 1819     heparin 25,000 units in dextrose 5% (100 units/ml) IV bolus from bag LOW INTENSITY nomogram - [facility]  As needed (PRN)        Question:  Heparin Infusion Adjustment (DO NOT MODIFY ANSWER)  Answer:  \[email or web address]\epic\Images\Pharmacy\HeparinInfusions\heparin LOW INTENSITY nomogram for [facility] [id number].pdf    02/12/24 1819     heparin 25,000 units in dextrose 5% 250 mL (100 units/mL) infusion LOW INTENSITY nomogram - [facility]  Continuous        Question:  Begin at (units/kg/hr)  Answer:  12 02/12/24 1819                   //Core Measures   -DVT proph: SCDs, heparin drip  -Code status: Full     -Surrogate: spouse       Components of this note were documented using a voice recognition system and are subject to errors not corrected at the time the document was proof read. Please contact the author for any clarifications.       On 02/12/2024, patient should be placed in hospital observation services under my care.       Kaleb Chang MD  Department of Hospital Medicine  [facility] - Telemetry ([facility])

## 2024-02-13 NOTE — ASSESSMENT & PLAN NOTE
Patient's FSGs are uncontrolled due to hyperglycemia on current medication regimen.  Last A1c reviewed-   Lab Results   Component Value Date    HGBA1C 11.7 (H) 02/12/2024     Most recent fingerstick glucose reviewed-   Recent Labs   Lab 02/13/24  0200 02/13/24  0446   POCTGLUCOSE 203* 202*       Current correctional scale  Medium  titrate as needed  anti-hyperglycemic dose as follows-   Antihyperglycemics (From admission, onward)      Start     Stop Route Frequency Ordered    02/13/24 1220  insulin aspart U-100 pen 0-10 Units         -- SubQ Before meals & nightly PRN 02/13/24 1121    02/13/24 0200  insulin detemir U-100 (Levemir) pen 10 Units         -- SubQ Nightly 02/13/24 0157        Plan:  -SSI  -Accu-checks  -Hold oral hypoglycemics while patient is in the hospital  -Hypoglycemic protocol

## 2024-02-13 NOTE — PLAN OF CARE
Problem: Adult Inpatient Plan of Care  Goal: Plan of Care Review  Outcome: Ongoing, Progressing  Goal: Optimal Comfort and Wellbeing  Outcome: Ongoing, Progressing     Problem: Diabetes Comorbidity  Goal: Blood Glucose Level Within Targeted Range  Outcome: Ongoing, Progressing     Problem: Adjustment to Illness (Acute Coronary Syndrome)  Goal: Optimal Adaptation to Illness  Outcome: Ongoing, Progressing     Problem: Cardiac-Related Pain (Acute Coronary Syndrome)  Goal: Absence of Cardiac-Related Pain  Outcome: Ongoing, Progressing     Problem: Hemodynamic Instability (Acute Coronary Syndrome)  Goal: Effective Cardiac Pump Function  Outcome: Ongoing, Progressing     Problem: Bleeding (Cardiac Catheterization)  Goal: Absence of Bleeding  Outcome: Ongoing, Progressing

## 2024-02-13 NOTE — HOSPITAL COURSE
2/13  Admitted for NSTEMI  Troponin trend 1.063 --> 1.370 --> 4.367 --> 5.284  Continue heparin drip, ASA, plavix, statin  TTE report as below  Telemetry monitoring    Echo    Result Date: 2/13/2024    Left Ventricle: The left ventricle is normal in size. Normal wall   thickness. There is concentric remodeling. Normal wall motion. There is   normal systolic function with a visually estimated ejection fraction of 60   - 65%. There is normal diastolic function.    Right Ventricle: Normal right ventricular cavity size. Wall thickness   is normal. Right ventricle wall motion  is normal. Systolic function is   normal.    Pulmonary Artery: The estimated pulmonary artery systolic pressure is   15 mmHg.    IVC/SVC: Normal venous pressure at 3 mmHg.       Patient reports chest pain and SOB improving  Cardiology following, plans for LHC today    2/14  [facility] yesterday - stents placed to LAD and angioplasty x 2 to the RCA  Patient monitored in ICU s/p LHC - arrived with IABP, dopamine, tirofiban    2/15  NAEON, denies chest pain, SOB  Stable for discharge home  Cardiology reccs reviewed  ASA, BB, statin, ticagrelor  F/U with Cardiology in 2-4 weeks

## 2024-02-13 NOTE — PROGRESS NOTES
[facility] - Telemetry ([facility])  [facility] Medicine  Progress Note    Patient Name: Tramaine Resendiz  MRN: [id number]  Patient Class: IP- Inpatient   Admission Date: 2/12/2024  Length of Stay: 0 days  Attending Physician: Jn Yu MD  Primary Care Provider: Jeane Doyle MD        Subjective:     Principal Problem:NSTEMI (non-ST elevated myocardial infarction)        HPI:  Tramaine Resendiz is a 68 y.o. male with a PMH  has a past medical history of Hypertension and Type 2 diabetes mellitus without complications (2012). who presented to the ED for further evaluation of acute onset chest pain prior to arrival.  Patient stated he was at local urgent care for evaluation of hip pain when he suddenly developed hot flashes followed by substernal chest pain radiating to his left shoulder, shortness of breath, lightheadedness/dizziness, and nausea.  Pain was described as pressure/sharp/stabbing in nature, constant, lasting approximately 10-20 minutes in duration, subsided without intervention, rated 7/10 in severity at its worst, currently chest pain-free, and reported no known alleviating or aggravating factors noted.  He reported no other associated symptoms and denied experiencing similar symptoms previously.  He denied significant cardiac history including negative MI, CHF, or arrhythmias, and reported being in his usual state of health prior to onset of symptoms.  All other review of systems negative except as noted above.  Initial workup in the ED revealed patient to be hypotensive, hyperglycemic, and troponin elevated at 1.063 with repeat 1.370.  Patient initiated on heparin drip and admitted to Hospital Medicine under observation for continued medical management and treatment of NSTEMI.    PCP: Jeane Doyle      Overview/Hospital Course:  2/13  Admitted for NSTEMI  Troponin trend 1.063 --> 1.370 --> 4.367 --> 5.284  Continue heparin drip, ASA, plavix, statin  TTE report as below  Telemetry  monitoring    Echo    Result Date: 2/13/2024    Left Ventricle: The left ventricle is normal in size. Normal wall   thickness. There is concentric remodeling. Normal wall motion. There is   normal systolic function with a visually estimated ejection fraction of 60   - 65%. There is normal diastolic function.    Right Ventricle: Normal right ventricular cavity size. Wall thickness   is normal. Right ventricle wall motion  is normal. Systolic function is   normal.    Pulmonary Artery: The estimated pulmonary artery systolic pressure is   15 mmHg.    IVC/SVC: Normal venous pressure at 3 mmHg.       Patient reports chest pain and SOB improving  Cardiology following, plans for [facility] today      Review of Systems   All other systems reviewed and are negative.    Objective:     Vital Signs (Most Recent):  Temp: 97.9 °F (36.6 °C) (02/13/24 0733)  Pulse: 77 (02/13/24 0900)  Resp: 18 (02/13/24 0733)  BP: (!) 95/55 (02/13/24 0736)  SpO2: 96 % (02/13/24 0733) Vital Signs (24h Range):  Temp:  [96.4 °F (35.8 °C)-98.3 °F (36.8 °C)] 97.9 °F (36.6 °C)  Pulse:  [66-91] 77  Resp:  [12-22] 18  SpO2:  [94 %-100 %] 96 %  BP: ()/() 95/55     Weight: 83.9 kg (185 lb)  Body mass index is 26.54 kg/m².  No intake or output data in the 24 hours ending 02/13/24 1117      Physical Exam  Vitals and nursing note reviewed.   Constitutional:       General: He is not in acute distress.     Appearance: Normal appearance. He is normal weight.   Cardiovascular:      Pulses: Normal pulses.      Heart sounds: Normal heart sounds.   Pulmonary:      Effort: Pulmonary effort is normal. No respiratory distress.      Breath sounds: Normal breath sounds. No wheezing, rhonchi or rales.   Abdominal:      General: Abdomen is flat. Bowel sounds are normal. There is no distension.      Palpations: Abdomen is soft.      Tenderness: There is no abdominal tenderness. There is no guarding.   Neurological:      General: No focal deficit present.      Mental  Status: He is alert and oriented to person, place, and time.   Psychiatric:         Mood and Affect: Mood normal.         Behavior: Behavior normal.             Significant Labs: All pertinent labs within the past 24 hours have been reviewed.  Recent Lab Results  (Last 5 results in the past 24 hours)        02/13/24  0906   02/13/24  0757   02/13/24  0446   02/13/24  0200   02/13/24  0128        Anion Gap   11             Ao root annulus 3.24               Ascending aorta 3.36               Ao peak missy 1.12               Ao VTI 20.20               PTT   50.4  Comment: Refer to local heparin nomogram for intensity/dose specific   therapeutic   range.         72.6  Comment: Refer to local heparin nomogram for intensity/dose specific   therapeutic   range.         AV valve area 2.83               RODERICK by Velocity Ratio 2.45               AV mean gradient 3               AV index (prosthetic) 0.92               AV peak gradient 5               AV Velocity Ratio 0.79               Baso #               Basophil %               BSA 2.04               BUN   17             Calcium   8.8             Chloride   107             CO2   21             Creatinine   0.9             Left Ventricle Relative Wall Thickness 0.69               Differential Method               E/A ratio 0.79               E/E' ratio 9.23               eGFR   >60             Eos #               Eos %               E wave deceleration time 287.30               FS 32               Glucose   194             Gran # (ANC)               Gran %               Hematocrit               Hemoglobin               Immature Grans (Abs)               Immature Granulocytes               IVC diameter 1.80               IVRT 91.34               IVSd 1.54               LA WIDTH 3.3               Left Atrium Major Axis 4.99               Left Atrium Minor Axis 4.84               LA size 3.15               LA volume 43.42               LA vol index 21.5               LVOT area  3.1               LV LATERAL E/E' RATIO 7.50               LV SEPTAL E/E' RATIO 12.00               LV EDV BP 66.82               LV Diastolic Volume Index 33.08               LVIDd 3.92               LVIDs 2.65               LV mass 214.41               LV Mass Index 106               Left Ventricular Outflow Tract Mean Gradient 1.99               Left Ventricular Outflow Tract Mean Velocity 0.69               LVOT diameter 1.98               LVOT peak scott 0.89               LVOT stroke volume 57.24               LVOT peak VTI 18.60               LV ESV BP 25.82               LV Systolic Volume Index 12.8               Lymph #               Lymph %               MCH               MCHC               MCV               Mean e' 0.07               Mono #               Mono %               MPV               MV valve area p 1/2 method 2.64               MV Peak A Scott 0.76               MV Peak E Scott 0.60               MV stenosis pressure 1/2 time 83.32               nRBC               Platelet Count               POCT Glucose     202   203         Potassium   3.8             PV mean gradient 1               Posterior Wall 1.36               RA Major Axis 3.85               Est. RA pres 3               RA Width 2.7               RBC               RDW               RV TB RVSP 5               RVOT peak scott 0.62               RVOT peak VTI 12.8               Sodium   139             STJ 3.38               TAPSE 1.81               TDI SEPTAL 0.05               TDI LATERAL 0.08               Triscuspid Valve Regurgitation Peak Gradient 12               TR Max Scott 1.75               Troponin I   7.957  Comment: The reference interval for Troponin I represents the 99th percentile   cutoff   for our facility and is consistent with 3rd generation assay   performance.         5.284  Comment: The reference interval for Troponin I represents the 99th percentile   cutoff   for our facility and is consistent with 3rd generation assay    performance.         TV mean gradient 12               TV resting pulmonary artery pressure 15               WBC               ZLVIDD -4.18               ZLVIDS -2.53                                      Significant Imaging: I have reviewed all pertinent imaging results/findings within the past 24 hours.    X-Ray Chest AP Portable   Final Result      No acute abnormality.         Electronically signed by: Felice Colmenares   Date:    02/12/2024   Time:    18:29            Assessment/Plan:      * NSTEMI (non-ST elevated myocardial infarction)  Patient presents with NSTEMI. Chest pain is currently controlled. JAVY score is 4. Patient is currently on NSTEMI Pathway.    EKG reviewed. Troponins reviewed and results noted-   Recent Labs   Lab 02/13/24  0757   TROPONINI 7.957*       Lipid panel reviewed and shows-     Lab Results   Component Value Date    LDLCALC 99.2 02/12/2024     Lab Results   Component Value Date    TRIG 59 02/12/2024     Medical management includes; Beta Blocker, Dual Anti-Platelet therapy, Anticoagulation, High Intensity Statin, and Nitrate Echo has not been performed. Latest ECHO results are as follows- No results found for this or any previous visit.    Consult for cardiac rehab is ordered. Patient counseled on lifestyle modifications- begin progressive daily aerobic exercise program, follow a low fat, low cholesterol diet, reduce salt in diet and cooking, reduce exposure to stress, improve dietary compliance, continue current medications, and continue current healthy lifestyle patterns. Cardiology is consulted. Plan of care pending with cardiology team. Continue to monitor patient closely and adjust therapy as needed.    2/13/24  Cardiology following  Plans for [facility] today  On heparin drip  ASA, plavix, statin    Type 2 diabetes mellitus  Patient's FSGs are uncontrolled due to hyperglycemia on current medication regimen.  Last A1c reviewed-   Lab Results   Component Value Date    HGBA1C 11.7 (H)  02/12/2024     Most recent fingerstick glucose reviewed-   Recent Labs   Lab 02/13/24  0200 02/13/24  0446   POCTGLUCOSE 203* 202*       Current correctional scale  Medium  titrate as needed  anti-hyperglycemic dose as follows-   Antihyperglycemics (From admission, onward)      Start     Stop Route Frequency Ordered    02/13/24 1220  insulin aspart U-100 pen 0-10 Units         -- SubQ Before meals & nightly PRN 02/13/24 1121    02/13/24 0200  insulin detemir U-100 (Levemir) pen 10 Units         -- SubQ Nightly 02/13/24 0157        Plan:  -SSI  -Accu-checks  -Hold oral hypoglycemics while patient is in the hospital  -Hypoglycemic protocol     Hypertension  Chronic, uncontrolled. Latest blood pressure and vitals reviewed-     Temp:  [96.4 °F (35.8 °C)-98.3 °F (36.8 °C)]   Pulse:  [66-91]   Resp:  [12-22]   BP: ()/()   SpO2:  [94 %-100 %] .   Home meds for hypertension were reviewed and noted below.     While in the hospital, will manage blood pressure as follows; Continue home antihypertensive regimen    Will utilize p.r.n. blood pressure medication only if patient's blood pressure greater than 160/100 and he develops symptoms such as worsening chest pain or shortness of breath.    Hyperlipidemia associated with type 2 diabetes mellitus  Patient is chronically on statin.will continue for now. Last Lipid Panel:   Lab Results   Component Value Date    CHOL 154 02/12/2024    HDL 43 02/12/2024    LDLCALC 99.2 02/12/2024    TRIG 59 02/12/2024    CHOLHDL 27.9 02/12/2024   Plan:  -Continue home medication  -low fat/low calorie diet      VTE Risk Mitigation (From admission, onward)           Ordered     IP VTE LOW RISK PATIENT  Once         02/12/24 2018     Reason for no Mechanical VTE Prophylaxis  Once        Question:  Reasons:  Answer:  Physician Provided (leave comment)  Comment:  on heparin drip    02/12/24 2018     Place sequential compression device  Until discontinued         02/12/24 2018     heparin  25,000 units in dextrose 5% (100 units/ml) IV bolus from bag LOW INTENSITY nomogram - [facility]  As needed (PRN)        Question:  Heparin Infusion Adjustment (DO NOT MODIFY ANSWER)  Answer:  [email or web address]\epic\Images\Pharmacy\HeparinInfusions\heparin LOW INTENSITY nomogram for [facility] [id number]    02/12/24 1819     heparin 25,000 units in dextrose 5% (100 units/ml) IV bolus from bag LOW INTENSITY nomogram - [facility]  As needed (PRN)        Question:  Heparin Infusion Adjustment (DO NOT MODIFY ANSWER)  Answer:  [email or web address]\epic\Images\Pharmacy\HeparinInfusions\heparin LOW INTENSITY nomogram for [facility] [id number].pdf    02/12/24 1819     heparin 25,000 units in dextrose 5% 250 mL (100 units/mL) infusion LOW INTENSITY nomogram - [facility]  Continuous        Question:  Begin at (units/kg/hr)  Answer:  12 02/12/24 1819                    Discharge Planning   TAWNY:      Code Status: Full Code   Is the patient medically ready for discharge?:     Reason for patient still in hospital (select all that apply): Patient trending condition, Laboratory test, Treatment, Imaging, and Consult recommendations           Inpatient Upgrade Note    Tramaine Resendiz has warranted treatment spanning two or more midnights of hospital level care for the management of chest pain. He continues to require daily labs, further testing/imaging, monitoring of vital signs, and further evaluation by consultants. His condition is also complicated by the following comorbidities: Coronary Artery Disease, Hypertension, and Diabetes.            Jn Yu MD  Department of Hospital Medicine   [facility] - Telemetry ([facility])

## 2024-02-13 NOTE — ASSESSMENT & PLAN NOTE
Chronic, uncontrolled. Latest blood pressure and vitals reviewed-     Temp:  [96.4 °F (35.8 °C)-98.3 °F (36.8 °C)]   Pulse:  [66-91]   Resp:  [12-22]   BP: ()/()   SpO2:  [94 %-100 %] .   Home meds for hypertension were reviewed and noted below.     While in the hospital, will manage blood pressure as follows; Continue home antihypertensive regimen    Will utilize p.r.n. blood pressure medication only if patient's blood pressure greater than 160/100 and he develops symptoms such as worsening chest pain or shortness of breath.

## 2024-02-13 NOTE — ADMISSIONCARE
AdmissionCare    Guideline: Myocardial Infarction, Inpatient    Based on the indications selected for the patient, the bed status of Inpatient was determined to be MET    The following indications were selected as present at the time of evaluation of the patient:      - Acute myocardial infarction (MI) (not in context of cardiac procedure within last 48 hours), as indicated by ALL of the following:    - Elevated cardiac troponin level, as indicated by 1 or more of the following:     - New or presumed new elevation of cardiac troponin level (ie, elevation clearly not due to chronic condition)     - Initial troponin elevated with subsequent increase or decrease in level of 20% or more (ie, indicative of acute myocardial injury)    - Myocardial injury due to acute ischemia, as indicated by 1 or more of the following:     - Symptoms consistent with myocardial ischemia (eg, chest pain, dyspnea)    AdmissionCare documentation entered by: Jn Yu    [facility] [employer], 27th edition, Copyright © 2023 [facility] [employer], [facility] All Rights Reserved.  [id number]:[id number]:00

## 2024-02-13 NOTE — HPI
Tramaine Resendiz is a 68 y.o. male with a PMH  has a past medical history of Hypertension and Type 2 diabetes mellitus without complications (2012). who presented to the ED for further evaluation of acute onset chest pain prior to arrival.  Patient stated he was at local urgent care for evaluation of hip pain when he suddenly developed hot flashes followed by substernal chest pain radiating to his left shoulder, shortness of breath, lightheadedness/dizziness, and nausea.  Pain was described as pressure/sharp/stabbing in nature, constant, lasting approximately 10-20 minutes in duration, subsided without intervention, rated 7/10 in severity at its worst, currently chest pain-free, and reported no known alleviating or aggravating factors noted.  He reported no other associated symptoms and denied experiencing similar symptoms previously.  He denied significant cardiac history including negative MI, CHF, or arrhythmias, and reported being in his usual state of health prior to onset of symptoms.  All other review of systems negative except as noted above.  Initial workup in the ED revealed patient to be hypotensive, hyperglycemic, and troponin elevated at 1.063 with repeat 1.370.  Patient initiated on heparin drip and admitted to Hospital Medicine under observation for continued medical management and treatment of NSTEMI.    PCP: Jeane Doyle

## 2024-02-13 NOTE — ASSESSMENT & PLAN NOTE
Patient is chronically on statin.will continue for now. Last Lipid Panel:   Lab Results   Component Value Date    CHOL 154 02/12/2024    HDL 43 02/12/2024    LDLCALC 99.2 02/12/2024    TRIG 59 02/12/2024    CHOLHDL 27.9 02/12/2024   Plan:  -Continue home medication  -low fat/low calorie diet

## 2024-02-13 NOTE — CONSULTS
[facility] - Telemetry ([facility])  Cardiology  Consult Note    Patient Name: Tramaine Resendiz  MRN: [id number]  Admission Date: 2/12/2024  Hospital Length of Stay: 0 days  Code Status: Full Code   Attending Provider: Jn Yu MD   Consulting Provider: Bernadette Way PA-C  Primary Care Physician: Jeane Doyle MD  Principal Problem:Chest pain    Patient information was obtained from patient, past medical records, and ER records.     Inpatient consult to Cardiology  Consult performed by: Bernadette Way PA-C  Consult ordered by: Kaleb Chang MD        Subjective:     Chief Complaint:  NSTEMI    HPI:   Mr. Resendiz is a 68 year old male patient whose current medical conditions include HTN and DM type II who was [place] [facility] [place]'s clinic yesterday due to chest pain. Patient complained of substernal chest pressure/heaviness that onset yesterday afternoon. Pain was intense and radiated to his jaw and shoulder area and was associated with diaphoresis. He denied any associated fever, SOB, chills, cough, palpitations, near syncope, or syncope. Initial workup in ED revealed troponin of 1.063>1.370 and patient was subsequently admitted for further evaluation and treatment. Cardiology consulted to assist with management. Patient seen and examined today, resting in bed. Feels ok. Reported some CP overnight, now resolved. No prior history of CAD/MI. Non-smoker. He admits he does not always take his prescribed medications. Chart reviewed. Troponin up to 7. EKG reviewed. TTE today. [facility] planned.    Past Medical History:   Diagnosis Date    Hypertension     Type 2 diabetes mellitus without complications 2012    BS didn't check 11/28/2022       Past Surgical History:   Procedure Laterality Date    HERNIA REPAIR         Review of patient's allergies indicates:   Allergen Reactions    Tomato        No current facility-administered medications on file prior to encounter.     Current Outpatient Medications on File  "Prior to Encounter   Medication Sig    insulin (LANTUS SOLOSTAR U-100 INSULIN) glargine 100 units/mL SubQ pen Inject 10 Units into the skin every evening.    simvastatin (ZOCOR) 10 MG tablet Take 1 tablet (10 mg total) by mouth every evening.    blood sugar diagnostic Strp To check BG 1-2 times daily, to use with insurance preferred meter    blood-glucose meter kit To check BG 1-2 times daily, to use with insurance preferred meter    cyclobenzaprine (FLEXERIL) 10 MG tablet Take 1 tablet (10 mg total) by mouth 3 (three) times daily.    lancets Misc To check BG 1-2 times daily, to use with insurance preferred meter    meloxicam (MOBIC) 7.5 MG tablet Take 1 tablet (7.5 mg total) by mouth once daily.    pen needle, diabetic 31 gauge x 5/16" Ndle to be used with lantus daily     Family History       Problem Relation (Age of Onset)    Diabetes Mother    Hypertension Mother          Tobacco Use    Smoking status: Former     Types: Cigarettes    Smokeless tobacco: Never   Substance and Sexual Activity    Alcohol use: Yes    Drug use: Not on file    Sexual activity: Not on file     Review of Systems   Constitutional: Positive for malaise/fatigue.   HENT:          Jaw pain     Eyes: Negative.    Cardiovascular:  Positive for chest pain (resolved at time of exam).   Respiratory: Negative.     Endocrine: Negative.    Hematologic/Lymphatic: Negative.    Skin: Negative.    Musculoskeletal:         L shoulder pain     Gastrointestinal: Negative.    Genitourinary: Negative.    Neurological: Negative.    Psychiatric/Behavioral: Negative.     Allergic/Immunologic: Negative.      Objective:     Vital Signs (Most Recent):  Temp: 97.9 °F (36.6 °C) (02/13/24 0733)  Pulse: 68 (02/13/24 0736)  Resp: 18 (02/13/24 0733)  BP: (!) 95/55 (02/13/24 0736)  SpO2: 96 % (02/13/24 0733) Vital Signs (24h Range):  Temp:  [96.4 °F (35.8 °C)-98.3 °F (36.8 °C)] 97.9 °F (36.6 °C)  Pulse:  [66-91] 68  Resp:  [12-22] 18  SpO2:  [94 %-100 %] 96 %  BP: " ()/() 95/55     Weight: 83.9 kg (185 lb)  Body mass index is 26.54 kg/m².    SpO2: 96 %       No intake or output data in the 24 hours ending 02/13/24 0930    Lines/Drains/Airways       Peripheral Intravenous Line  Duration                  Peripheral IV - Single Lumen 02/12/24 1734 20 G Right Forearm <1 day         Peripheral IV - Single Lumen 02/12/24 1958 20 G Anterior;Left Forearm <1 day                     Physical Exam  Vitals and nursing note reviewed.   Constitutional:       General: He is not in acute distress.     Appearance: Normal appearance. He is well-developed. He is not diaphoretic.   HENT:      Head: Normocephalic and atraumatic.   Eyes:      General:         Right eye: No discharge.         Left eye: No discharge.      Pupils: Pupils are equal, round, and reactive to light.   Cardiovascular:      Rate and Rhythm: Normal rate and regular rhythm.      Heart sounds: Normal heart sounds, S1 normal and S2 normal. No murmur heard.  Pulmonary:      Effort: Pulmonary effort is normal. No respiratory distress.      Breath sounds: Normal breath sounds. No wheezing or rales.   Abdominal:      General: There is no distension.   Musculoskeletal:      Right lower leg: No edema.      Left lower leg: No edema.   Skin:     General: Skin is warm and dry.      Findings: No erythema.   Neurological:      General: No focal deficit present.      Mental Status: He is alert and oriented to person, place, and time.   Psychiatric:         Mood and Affect: Mood normal.         Behavior: Behavior normal.         Thought Content: Thought content normal.          Significant Labs: CMP   Recent Labs   Lab 02/12/24 1734 02/13/24  0757    139   K 4.1 3.8    107   CO2 20* 21*   * 194*   BUN 15 17   CREATININE 1.1 0.9   CALCIUM 9.3 8.8   PROT 7.7  --    ALBUMIN 3.9  --    BILITOT 0.3  --    ALKPHOS 96  --    AST 25  --    ALT 17  --    ANIONGAP 14 11   , CBC   Recent Labs   Lab 02/12/24  1734  02/13/24  0125   WBC 10.99 8.53   HGB 14.3 12.4*   HCT 44.0 38.0*    255   , Troponin   Recent Labs   Lab 02/12/24  2318 02/13/24  0128 02/13/24  0757   TROPONINI 4.367* 5.284* 7.957*   , and All pertinent lab results from the last 24 hours have been reviewed.    Significant Imaging: Echocardiogram: Transthoracic echo (TTE) complete (Cupid Only):   Results for orders placed or performed during the hospital encounter of 02/12/24   Echo   Result Value Ref Range    BSA 2.04 m2   , EKG: Reviewed CXR reviewed  Assessment and Plan:   Patient who presents with CP/NSTEMI. Stable this AM. Troponin up to 7. Continue OMT. Check TTE. LHC today. Discussed importance of absolute medication compliance.    * Chest pain  -See plan under NSTEMI    Hypertension  -BP soft  -Will optimize regimen as tolerated    NSTEMI (non-ST elevated myocardial infarction)  -Presents with typical CP symptoms/troponin elevation  -Consistent with NSTEMI  -Stable/CP free during exam  -Continue ASA, statin, heparin gtt  -No  BB given borderline BP  -Nitrates prn  -TTE pending  -LHC today. All risks, benefits, and treatment alternatives explained to patient in detail. All questions answered. He has agreed to proceed.    Hyperlipidemia associated with type 2 diabetes mellitus  -Statin    Type 2 diabetes mellitus  -Mgmt as per hospital medicine        VTE Risk Mitigation (From admission, onward)           Ordered     IP VTE LOW RISK PATIENT  Once         02/12/24 2018     Reason for no Mechanical VTE Prophylaxis  Once        Question:  Reasons:  Answer:  Physician Provided (leave comment)  Comment:  on heparin drip    02/12/24 2018     Place sequential compression device  Until discontinued         02/12/24 2018     heparin 25,000 units in dextrose 5% (100 units/ml) IV bolus from bag LOW INTENSITY nomogram - [facility]  As needed (PRN)        Question:  Heparin Infusion Adjustment (DO NOT MODIFY ANSWER)  Answer:   [email or web address]\epic\Images\Pharmacy\HeparinInfusions\heparin LOW INTENSITY nomogram for [facility] [id number].pdf    02/12/24 1819     heparin 25,000 units in dextrose 5% (100 units/ml) IV bolus from bag LOW INTENSITY nomogram - [facility]  As needed (PRN)        Question:  Heparin Infusion Adjustment (DO NOT MODIFY ANSWER)  Answer:  \\[email or web address]\epic\Images\Pharmacy\HeparinInfusions\heparin LOW INTENSITY nomogram for [facility] [id number].pdf    02/12/24 1819     heparin 25,000 units in dextrose 5% 250 mL (100 units/mL) infusion LOW INTENSITY nomogram - [facility]  Continuous        Question:  Begin at (units/kg/hr)  Answer:  12    02/12/24 1819                    Thank you for your consult. I will follow-up with patient. Please contact us if you have any additional questions.    Bernadette Way PA-C  Cardiology   [facility] - Telemetry ([facility])

## 2024-02-13 NOTE — CONSULTS
Diabetes Education  Author: Mansi Hillman RN  Date: 2/13/2024      Consulted for diabetes education. Wife and daughter at bedside and participating in education. Daughter states she is a diabetic also. Patient states he doesn't eat a diabetic diet. Discussed some options for diabetic diet with patient and family. Patient states his PCP has placed him on insulin this month and he started it the week prior to admit. States wife gives him his injections. Also states that his PCP has also discussed his diet. Patient states he will review diet options and attempt to eat a diabetic diet. Patient doesn't check his blood sugar levels and states he needs another meter and supplies ordered. I gave patient a sample meter and supplies and will message provider to order when discharged. Educational handouts given to patient and encouraged patient and family to contact myself for any additional information.                   Health Maintenance was reviewed today with patient. Discussed with patient importance of routine eye exams, foot exams/foot care, blood work (i.e.: A1c, microalbumin, and lipid), dental visits, yearly flu vaccine, and pneumonia vaccine as indicated by PCP. Patient verbalized understanding.     Health Maintenance Topics with due status: Not Due       Topic Last Completion Date    Diabetes Urine Screening 09/11/2023    Foot Exam 01/25/2024    Lipid Panel 02/12/2024    Hemoglobin A1c 02/12/2024     Health Maintenance Due   Topic Date Due    TETANUS VACCINE  Never done    High Dose Statin  Never done    Colorectal Cancer Screening  Never done    Shingles Vaccine (1 of 2) Never done    RSV Vaccine (Age 60+ and Pregnant patients) (1 - 1-dose 60+ series) Never done    Abdominal Aortic Aneurysm Screening  Never done    Eye Exam  11/28/2023    COVID-19 Vaccine (5 - 2023-24 season) 12/07/2023                      The patient was encouraged to communicate with his physician and care team regarding his condition(s)  and treatment.  I provided the patient with my contact information today and encouraged him to contact me via phone or patient portal as needed.

## 2024-02-13 NOTE — BRIEF OP NOTE
INPATIENT Operative Note         SUMMARY     Surgery Date: 2/13/2024     Surgeon(s) and Role:     * Matthew Orellana MD - Primary    ASSISTANT:    Pre-op Diagnosis:  nstemi      Post-op Diagnosis:  nstemi    Procedure(s) (LRB):  Left heart cath (Left)  Thrombectomy, Coronary  Percutaneous coronary intervention (N/A)  Stent, Drug Eluting, Multi Vessel, Coronary  PCI, FOR CHRONIC TOTAL CORONARY ARTERY OCCLUSION  IVUS, Coronary  Placement, IABP  Placement of Closure Device    COMPLICATION:acute stent thrombosis     Anesthesia: RN IV Sedation    Findings/Key Components:  Lad stent treated with 3 stents.  Rca occluded  treated with thrombectomy stent placement subsequently had thrombosis needing aggrastat and repeat balloon angioplasty.    Estimated Blood Loss: < 50 ML.         SPECIMEN: NONE    Devices/Prostetics: synergy stent and orisero stent    PLAN: aggrastat overnite   Brilinta asa   B blockers.

## 2024-02-13 NOTE — HPI
Mr. Resendiz is a 68 year old male patient whose current medical conditions include HTN and DM type II who was sent to [facility] from his PCP's clinic yesterday due to chest pain. Patient complained of substernal chest pressure/heaviness that onset yesterday afternoon. Pain was intense and radiated to his jaw and shoulder area and was associated with diaphoresis. He denied any associated fever, SOB, chills, cough, palpitations, near syncope, or syncope. Initial workup in ED revealed troponin of 1.063>1.370 and patient was subsequently admitted for further evaluation and treatment. Cardiology consulted to assist with management. Patient seen and examined today, resting in bed. Feels ok. Reported some CP overnight, now resolved. No prior history of CAD/MI. Non-smoker. He admits he does not always take his prescribed medications. Chart reviewed. Troponin up to 7. EKG reviewed. TTE today. [facility] planned.

## 2024-02-13 NOTE — ASSESSMENT & PLAN NOTE
Patient's FSGs are uncontrolled due to hyperglycemia on current medication regimen.  Last A1c reviewed-   Lab Results   Component Value Date    HGBA1C 11.7 (H) 02/12/2024     Most recent fingerstick glucose reviewed-   Recent Labs   Lab 02/13/24 0200   POCTGLUCOSE 203*     Current correctional scale  Low  titrate as needed  anti-hyperglycemic dose as follows-   Antihyperglycemics (From admission, onward)      Start     Stop Route Frequency Ordered    02/13/24 0257  insulin aspart U-100 pen 0-5 Units         -- SubQ Before meals & nightly PRN 02/13/24 0157 02/13/24 0200  insulin detemir U-100 (Levemir) pen 10 Units         -- SubQ Nightly 02/13/24 0157        Plan:  -SSI  -Accu-checks  -Hold oral hypoglycemics while patient is in the hospital  -Hypoglycemic protocol

## 2024-02-13 NOTE — ASSESSMENT & PLAN NOTE
Patient presents with NSTEMI. Chest pain is currently controlled. JAVY score is 4. Patient is currently on NSTEMI Pathway.    EKG reviewed. Troponins reviewed and results noted-   Recent Labs   Lab 02/13/24  0757   TROPONINI 7.957*       Lipid panel reviewed and shows-     Lab Results   Component Value Date    LDLCALC 99.2 02/12/2024     Lab Results   Component Value Date    TRIG 59 02/12/2024     Medical management includes; Beta Blocker, Dual Anti-Platelet therapy, Anticoagulation, High Intensity Statin, and Nitrate Echo has not been performed. Latest ECHO results are as follows- No results found for this or any previous visit.    Consult for cardiac rehab is ordered. Patient counseled on lifestyle modifications- begin progressive daily aerobic exercise program, follow a low fat, low cholesterol diet, reduce salt in diet and cooking, reduce exposure to stress, improve dietary compliance, continue current medications, and continue current healthy lifestyle patterns. Cardiology is consulted. Plan of care pending with cardiology team. Continue to monitor patient closely and adjust therapy as needed.    2/13/24  Cardiology following  Plans for [facility] today  On heparin drip  ASA, plavix, statin

## 2024-02-13 NOTE — ASSESSMENT & PLAN NOTE
Patient presents with NSTEMI. Chest pain is currently controlled. JAVY score is 4. Patient is currently on NSTEMI Pathway.    EKG reviewed. Troponins reviewed and results noted-   Recent Labs   Lab 02/13/24  0128   TROPONINI 5.284*     Lipid panel reviewed and shows-     Lab Results   Component Value Date    LDLCALC 99.2 02/12/2024     Lab Results   Component Value Date    TRIG 59 02/12/2024     Medical management includes; Beta Blocker, Dual Anti-Platelet therapy, Anticoagulation, High Intensity Statin, and Nitrate Echo has not been performed. Latest ECHO results are as follows- No results found for this or any previous visit.    Consult for cardiac rehab is ordered. Patient counseled on lifestyle modifications- begin progressive daily aerobic exercise program, follow a low fat, low cholesterol diet, reduce salt in diet and cooking, reduce exposure to stress, improve dietary compliance, continue current medications, and continue current healthy lifestyle patterns. Cardiology is consulted. Plan of care pending with cardiology team. Continue to monitor patient closely and adjust therapy as needed.

## 2024-02-13 NOTE — ASSESSMENT & PLAN NOTE
Chronic, uncontrolled. Latest blood pressure and vitals reviewed-     Temp:  [96.4 °F (35.8 °C)-98 °F (36.7 °C)]   Pulse:  [66-91]   Resp:  [13-22]   BP: ()/()   SpO2:  [94 %-100 %] .   Home meds for hypertension were reviewed and noted below.     While in the hospital, will manage blood pressure as follows; Continue home antihypertensive regimen    Will utilize p.r.n. blood pressure medication only if patient's blood pressure greater than 160/100 and he develops symptoms such as worsening chest pain or shortness of breath.

## 2024-02-13 NOTE — PHARMACY MED REC
"Admission Medication History     The home medication history was taken by Carmen Jermain.    You may go to "Admission" then "Reconcile Home Medications" tabs to review and/or act upon these items.     The home medication list has been updated by the Pharmacy department.   Please read ALL comments highlighted in yellow.   Please address this information as you see fit.    Feel free to contact us if you have any questions or require assistance.        Medications listed below were obtained from: Patient/family and Analytic software- [employer]  (Not in a hospital admission)      LAST [facility] REC COMPLETED:     Carmen Aly  [id number]    Current Outpatient Medications on File Prior to Encounter   Medication Sig Dispense Refill Last Dose    insulin (LANTUS SOLOSTAR U-100 INSULIN) glargine 100 units/mL SubQ pen Inject 10 Units into the skin every evening. 3 mL 1 2/11/2024    simvastatin (ZOCOR) 10 MG tablet Take 1 tablet (10 mg total) by mouth every evening. 90 tablet 3 Past Week    blood sugar diagnostic Strp To check BG 1-2 times daily, to use with insurance preferred meter 100 strip 2     blood-glucose meter kit To check BG 1-2 times daily, to use with insurance preferred meter 1 each 0     cyclobenzaprine (FLEXERIL) 10 MG tablet Take 1 tablet (10 mg total) by mouth 3 (three) times daily. 30 tablet 1 Unknown    glyBURIDE (DIABETA) 5 MG tablet Take 1 tablet (5 mg total) by mouth 2 (two) times daily with meals. (Patient not taking: Reported on 2/12/2024) 180 tablet 3 Not Taking    lancets Misc To check BG 1-2 times daily, to use with insurance preferred meter 100 each 2     meloxicam (MOBIC) 7.5 MG tablet Take 1 tablet (7.5 mg total) by mouth once daily. 30 tablet 1 Unknown    pen needle, diabetic 31 gauge x 5/16" Ndle to be used with lantus daily 100 each 11     sildenafiL (VIAGRA) 100 MG tablet Take 1/2 to 1 tab by mouth 30 mins prior to sex (Patient not taking: Reported on 1/25/2024) 10 tablet 5 Unknown    tadalafiL " (CIALIS) 20 MG Tab Take 1 tablet (20 mg total) by mouth once daily. 30 tablet 11 Unknown                           .

## 2024-02-14 LAB
ALBUMIN SERPL BCP-MCNC: 3 G/DL (ref 3.5–5.2)
ALP SERPL-CCNC: 74 U/L (ref 55–135)
ALT SERPL W/O P-5'-P-CCNC: 21 U/L (ref 10–44)
ANION GAP SERPL CALC-SCNC: 11 MMOL/L (ref 8–16)
APTT PPP: 39.7 SEC (ref 21–32)
APTT PPP: 64.1 SEC (ref 21–32)
AST SERPL-CCNC: 100 U/L (ref 10–40)
BASOPHILS # BLD AUTO: 0.02 K/UL (ref 0–0.2)
BASOPHILS NFR BLD: 0.2 % (ref 0–1.9)
BILIRUB SERPL-MCNC: 0.4 MG/DL (ref 0.1–1)
BUN SERPL-MCNC: 14 MG/DL (ref 8–23)
CALCIUM SERPL-MCNC: 8.5 MG/DL (ref 8.7–10.5)
CHLORIDE SERPL-SCNC: 111 MMOL/L (ref 95–110)
CO2 SERPL-SCNC: 20 MMOL/L (ref 23–29)
CREAT SERPL-MCNC: 0.9 MG/DL (ref 0.5–1.4)
DIFFERENTIAL METHOD BLD: ABNORMAL
EOSINOPHIL # BLD AUTO: 0 K/UL (ref 0–0.5)
EOSINOPHIL NFR BLD: 0.2 % (ref 0–8)
ERYTHROCYTE [DISTWIDTH] IN BLOOD BY AUTOMATED COUNT: 14.1 % (ref 11.5–14.5)
EST. GFR  (NO RACE VARIABLE): >60 ML/MIN/1.73 M^2
GLUCOSE SERPL-MCNC: 197 MG/DL (ref 70–110)
HCT VFR BLD AUTO: 36.7 % (ref 40–54)
HGB BLD-MCNC: 11.5 G/DL (ref 14–18)
IMM GRANULOCYTES # BLD AUTO: 0.03 K/UL (ref 0–0.04)
IMM GRANULOCYTES NFR BLD AUTO: 0.3 % (ref 0–0.5)
LYMPHOCYTES # BLD AUTO: 1.6 K/UL (ref 1–4.8)
LYMPHOCYTES NFR BLD: 16.2 % (ref 18–48)
MAGNESIUM SERPL-MCNC: 1.8 MG/DL (ref 1.6–2.6)
MCH RBC QN AUTO: 25.3 PG (ref 27–31)
MCHC RBC AUTO-ENTMCNC: 31.3 G/DL (ref 32–36)
MCV RBC AUTO: 81 FL (ref 82–98)
MONOCYTES # BLD AUTO: 1.5 K/UL (ref 0.3–1)
MONOCYTES NFR BLD: 14.9 % (ref 4–15)
NEUTROPHILS # BLD AUTO: 6.8 K/UL (ref 1.8–7.7)
NEUTROPHILS NFR BLD: 68.2 % (ref 38–73)
NRBC BLD-RTO: 0 /100 WBC
OHS QRS DURATION: 84 MS
OHS QRS DURATION: 90 MS
OHS QRS DURATION: 94 MS
OHS QTC CALCULATION: 442 MS
OHS QTC CALCULATION: 445 MS
OHS QTC CALCULATION: 466 MS
PLATELET # BLD AUTO: 251 K/UL (ref 150–450)
PMV BLD AUTO: 11.4 FL (ref 9.2–12.9)
POCT GLUCOSE: 166 MG/DL (ref 70–110)
POCT GLUCOSE: 169 MG/DL (ref 70–110)
POCT GLUCOSE: 182 MG/DL (ref 70–110)
POCT GLUCOSE: 224 MG/DL (ref 70–110)
POTASSIUM SERPL-SCNC: 3.6 MMOL/L (ref 3.5–5.1)
PROT SERPL-MCNC: 6.3 G/DL (ref 6–8.4)
RBC # BLD AUTO: 4.55 M/UL (ref 4.6–6.2)
SODIUM SERPL-SCNC: 142 MMOL/L (ref 136–145)
WBC # BLD AUTO: 10.02 K/UL (ref 3.9–12.7)

## 2024-02-14 PROCEDURE — 63600175 PHARM REV CODE 636 W HCPCS: Mod: HCNC | Performed by: INTERNAL MEDICINE

## 2024-02-14 PROCEDURE — 94761 N-INVAS EAR/PLS OXIMETRY MLT: CPT | Mod: HCNC

## 2024-02-14 PROCEDURE — 27000221 HC OXYGEN, UP TO 24 HOURS: Mod: HCNC

## 2024-02-14 PROCEDURE — 99232 SBSQ HOSP IP/OBS MODERATE 35: CPT | Mod: 25,HCNC,, | Performed by: INTERNAL MEDICINE

## 2024-02-14 PROCEDURE — 85730 THROMBOPLASTIN TIME PARTIAL: CPT | Mod: 91,HCNC | Performed by: INTERNAL MEDICINE

## 2024-02-14 PROCEDURE — 93005 ELECTROCARDIOGRAM TRACING: CPT | Mod: HCNC

## 2024-02-14 PROCEDURE — 99900035 HC TECH TIME PER 15 MIN (STAT): Mod: HCNC

## 2024-02-14 PROCEDURE — 36415 COLL VENOUS BLD VENIPUNCTURE: CPT | Mod: HCNC | Performed by: INTERNAL MEDICINE

## 2024-02-14 PROCEDURE — 25000003 PHARM REV CODE 250: Mod: HCNC | Performed by: INTERNAL MEDICINE

## 2024-02-14 PROCEDURE — 83735 ASSAY OF MAGNESIUM: CPT | Mod: HCNC | Performed by: INTERNAL MEDICINE

## 2024-02-14 PROCEDURE — 93010 ELECTROCARDIOGRAM REPORT: CPT | Mod: HCNC,,, | Performed by: INTERNAL MEDICINE

## 2024-02-14 PROCEDURE — 85025 COMPLETE CBC W/AUTO DIFF WBC: CPT | Mod: HCNC | Performed by: INTERNAL MEDICINE

## 2024-02-14 PROCEDURE — 80053 COMPREHEN METABOLIC PANEL: CPT | Mod: HCNC | Performed by: INTERNAL MEDICINE

## 2024-02-14 PROCEDURE — 21400001 HC TELEMETRY ROOM: Mod: HCNC

## 2024-02-14 RX ORDER — MAGNESIUM SULFATE HEPTAHYDRATE 40 MG/ML
2 INJECTION, SOLUTION INTRAVENOUS ONCE
Status: COMPLETED | OUTPATIENT
Start: 2024-02-14 | End: 2024-02-14

## 2024-02-14 RX ADMIN — MAGNESIUM SULFATE HEPTAHYDRATE 2 G: 40 INJECTION, SOLUTION INTRAVENOUS at 09:02

## 2024-02-14 RX ADMIN — PRAVASTATIN SODIUM 80 MG: 20 TABLET ORAL at 09:02

## 2024-02-14 RX ADMIN — ONDANSETRON 8 MG: 8 TABLET, ORALLY DISINTEGRATING ORAL at 05:02

## 2024-02-14 RX ADMIN — MUPIROCIN: 20 OINTMENT TOPICAL at 09:02

## 2024-02-14 RX ADMIN — METOPROLOL TARTRATE 25 MG: 25 TABLET, FILM COATED ORAL at 09:02

## 2024-02-14 RX ADMIN — TICAGRELOR 90 MG: 90 TABLET ORAL at 09:02

## 2024-02-14 RX ADMIN — SODIUM CHLORIDE: 9 INJECTION, SOLUTION INTRAVENOUS at 12:02

## 2024-02-14 RX ADMIN — ASPIRIN 81 MG CHEWABLE TABLET 81 MG: 81 TABLET CHEWABLE at 09:02

## 2024-02-14 RX ADMIN — POTASSIUM BICARBONATE 40 MEQ: 391 TABLET, EFFERVESCENT ORAL at 09:02

## 2024-02-14 RX ADMIN — INSULIN DETEMIR 10 UNITS: 100 INJECTION, SOLUTION SUBCUTANEOUS at 09:02

## 2024-02-14 RX ADMIN — HEPARIN SODIUM 10 UNITS/KG/HR: 10000 INJECTION, SOLUTION INTRAVENOUS at 02:02

## 2024-02-14 RX ADMIN — INSULIN ASPART 4 UNITS: 100 INJECTION, SOLUTION INTRAVENOUS; SUBCUTANEOUS at 05:02

## 2024-02-14 RX ADMIN — INSULIN ASPART 2 UNITS: 100 INJECTION, SOLUTION INTRAVENOUS; SUBCUTANEOUS at 12:02

## 2024-02-14 RX ADMIN — TIROFIBAN 0.15 MCG/KG/MIN: 5 INJECTION, SOLUTION INTRAVENOUS at 05:02

## 2024-02-14 NOTE — PLAN OF CARE
[facility] - Intensive Care (Hospital)  Initial Discharge Assessment       Primary Care Provider: Jeane Doyle MD    Admission Diagnosis: NSTEMI (non-ST elevated myocardial infarction) [I21.4]  Chest pain [R07.9]    Admission Date: 2/12/2024  Expected Discharge Date:     Transition of Care Barriers: None    Payor: HU[employer] MANAGED MEDICARE / Plan: HU[employer] MEDICARE HMO / Product Type: Capitation /     Extended Emergency Contact Information  Primary Emergency Contact: tenisha lyle  Mobile Phone: [phone]  Relation: Sister  Preferred language: English   needed? No  Secondary Emergency Contact: makcourt  Mobile Phone: [phone]  Relation: Friend  Preferred language: English   needed? No    Discharge Plan A: Home with family, Home Health         [facility] [address] [place], [place] - [address] [address]  [address] [address] [address]  Phone: [phone] Fax: [phone]    [facility] [address] [place], [place]  [address] [address]  Phone: [phone] Fax: [phone]      Initial Assessment (most recent)       Adult Discharge Assessment - 02/14/24 1147          Discharge Assessment    Assessment Type Discharge Planning Assessment     Confirmed/corrected address, phone number and insurance Yes     Confirmed Demographics Correct on Facesheet     Source of Information family     Communicated TAWNY with patient/caregiver Date not available/Unable to determine     Reason For Admission NSTEMI     People in Home significant other     Facility Arrived From: home     Do you expect to return to your current living situation? Yes     Do you have help at home or someone to help you manage your care at home? Yes     Who are your caregiver(s) and their phone number(s)? Court     Prior to hospitilization cognitive status: Alert/Oriented     Current cognitive status: Unable to Assess     Walking or Climbing Stairs Difficulty no      Dressing/Bathing Difficulty no     Equipment Currently Used at Home cane, straight     Readmission within 30 days? No     Patient currently being followed by outpatient case management? No     Do you currently have service(s) that help you manage your care at home? No     Do you take prescription medications? Yes     Do you have prescription coverage? Yes     Do you have any problems affording any of your prescribed medications? TBD     Is the patient taking medications as prescribed? yes     Who is going to help you get home at discharge? family     How do you get to doctors appointments? family or friend will provide;car, drives self     Are you on dialysis? No     Discharge Plan A Home with family;Home Health     DME Needed Upon Discharge  other (see comments)   pending clinical course    Discharge Plan discussed with: Patient;Spouse/sig other     Transition of Care Barriers None                        Anticipated DC Dispo: home with friendОльга  Prior Level of Function: independent, use of cane. Patient drives, denies [facility]  PCP: Jeane Doyle MD  Comments:  CM met with patient/friend (Ольга) at bedside to introduce role and discuss d/c planning. Patient lives with Ольга who will provide assistance upon discharge. CM following for needs.

## 2024-02-14 NOTE — CONSULTS
[facility] - [facility] (Hospital)  Adult Nutrition  Consult Note    SUMMARY     Recommendations    Recommendation/Intervention:   1. Recommend pt continues on Cardiac diet   2. If PO intake < 50% recommend add Boost glucose control BID   3. Recommend Davy BID for wound healing   4. Nutrition education witll be provided   5. Weight twice weekly    Goals:   1. Pt will tolerate and consume > 75% EEN and EPN prior to RD follow up   2. Pt will consume Davy BID prior to RD follow up   3. Nutrition education will be provided at RD follow up  Nutrition Goal Status: new  Communication of RD Recs: other (comment) (POC, sticky note)    Assessment and Plan    Nutrition Problem  Inadequate protein-energy intake   Increased protein needs   Food and nutrition related knowledge deficit     Related to (etiology):   Decreased ability to consume sufficient protein/energy   Increased demand for nutrition   Lack of prior exposure to accurate nutrition related information     Signs and Symptoms (as evidenced by):   No meals received yet   Surgery, Wound healing   NSTEMI, HLD, HTN    Interventions/Recommendations (treatment strategy):  1. Decreased sodium and fat modified diet   2. Commercial beverage  3. Vitamin and mineral supplement therapy for wound healing   4. Content related nutrition education   5. Collaboration with other providers     Nutrition Diagnosis Status:   New       Malnutrition Assessment     Skin (Micronutrient): dry, bruised, wounds unhealed, edema (Tariq score = 15 (mild risk)       Fluid Accumulation (Malnutrition):  (1+ trace)                         Reason for Assessment    Reason For Assessment: consult (diet assesment and cardiac diet education)  Diagnosis:  (NSTEMI)  Relevant Medical History: T2DM, HLD, HTN  General Information Comments:   2/14/24: 68 y.o. Male admitted for NSTEMI. Pt currently on Cardiac diet, in the ICU. RD consulted for diet assesment and cardiac diet education. H&P noted that the pt  "presented to the ED for further evaluation of acute onset chest pain PTA, pt reported sx of sudden hot flashes followed by substernal chest pain radiating to his L shoulder, SOB, lightheadedness/dizziness, and nausea. Pulmonology PA noted on 2/14 that the pt was taken for LHC/PCTA 2/13 for NSTEMI with stent x3 to LAD and IABP, pt is doing well today, pt reported no chest pain/SOB, noted balloon pump pulled and BP stable. Visited pt at bedside, pt sleeping, visual NFPE performed w/ no signs of malnutrition noted. Spoke to RN stated pt has yet to have meals delivered, pt reported appetite and asking for food, pt had pudding for breakfast w/ no chewing/swallowing difficulties, N/V/D, or abd pain reported, informed RN would confirm pt does begin to receive meals. Unable to provide nutrition education at this time, attached "Heart Healthy Diet" and "Low Salt Diet" to pt discharge papers, will provide handouts and discuss nutritional education at RD follow up. Reviewed chart: LBM 2/12; Skin: dry, bruised; Tariq score: 15 (mild risk); Edema: 1+ trace generalized. Labs, meds, weight reviewed. Weight charted 1/11 189 lbs, 2/14 194 lbs (BMI 27.84, Normal for age), + 5 lb wt gain x 1 month. RD will continue to follow and monitor pt's nutritional status during admit.    Nutrition Discharge Planning: Diabetic 2000 calorie, Cardiac diet + Davy BID + Boost glucose control as warranted    Nutrition Risk Screen    Nutrition Risk Screen: no indicators present    Nutrition/Diet History    Spiritual, Cultural Beliefs, [employer] Practices, Values that Affect Care: no  Food Allergies: other (see comments) (tomato)  Factors Affecting Nutritional Intake: None identified at this time    Anthropometrics    Temp: 98 °F (36.7 °C)  Height: 5' 10" (177.8 cm)  Height (inches): 70 in  Weight Method: Bed Scale  Weight: 88 kg (194 lb 0.1 oz)  Weight (lb): 194.01 lb  Ideal Body Weight (IBW), Male: 166 lb  % Ideal Body Weight, Male (lb): 116.87 " %  BMI (Calculated): 27.8  BMI Grade: 25 - 29.9 - overweight (Normal for age)     Wt Readings from Last 15 Encounters:   02/14/24 88 kg (194 lb 0.1 oz)   02/12/24 83.5 kg (184 lb 1.4 oz)   01/25/24 84.1 kg (185 lb 6.5 oz)   01/11/24 86.1 kg (189 lb 14.8 oz)   10/12/23 84.4 kg (186 lb)   10/04/23 84.6 kg (186 lb 8.2 oz)   09/28/23 84.9 kg (187 lb 2.7 oz)   09/12/23 83 kg (183 lb)   09/12/23 83 kg (183 lb)   09/11/23 83.3 kg (183 lb 10.3 oz)   03/23/23 86.6 kg (190 lb 14.7 oz)   03/16/23 86.7 kg (191 lb 4 oz)   03/13/23 85.8 kg (189 lb 2.5 oz)   01/06/23 86.7 kg (191 lb 2.2 oz)   12/12/22 86.7 kg (191 lb 2.2 oz)     Lab/Procedures/Meds    Pertinent Labs Reviewed: reviewed  Pertinent Labs Comments: Calcium (L), Albumin (L), Cl (H), Glu (H), AST (H)  Pertinent Medications Reviewed: reviewed  Pertinent Medications Comments: ticagrelor, provastatin, Lopressor, magnesium sulfate, Levemir, aspirin  BMP  Lab Results   Component Value Date     02/14/2024    K 3.6 02/14/2024     (H) 02/14/2024    CO2 20 (L) 02/14/2024    BUN 14 02/14/2024    CREATININE 0.9 02/14/2024    CALCIUM 8.5 (L) 02/14/2024    ANIONGAP 11 02/14/2024    EGFRNORACEVR >60 02/14/2024     Lab Results   Component Value Date    ALBUMIN 3.0 (L) 02/14/2024     Lab Results   Component Value Date    ALT 21 02/14/2024     (H) 02/14/2024    ALKPHOS 74 02/14/2024    BILITOT 0.4 02/14/2024     Recent Labs   Lab 02/14/24  1204   POCTGLUCOSE 169*     Lab Results   Component Value Date    HGBA1C 11.7 (H) 02/12/2024     Lab Results   Component Value Date    WBC 10.02 02/14/2024    HGB 11.5 (L) 02/14/2024    HCT 36.7 (L) 02/14/2024    MCV 81 (L) 02/14/2024     02/14/2024       Scheduled Meds:   aspirin  81 mg Oral Daily    insulin detemir U-100  10 Units Subcutaneous QHS    metoprolol tartrate  25 mg Oral BID    mupirocin   Nasal BID    pravastatin  80 mg Oral Daily    ticagrelor  90 mg Oral BID     Continuous Infusions:   dexmedeTOMIDine  (Precedex) infusion (titrating) 0.2 mcg/kg/hr (02/14/24 1100)    DOPamine Stopped (02/13/24 2030)    tirofiban-0.9% sodium chloride 12.5 mg/250ml Stopped (02/13/24 1752)     PRN Meds:.acetaminophen, dextrose 10%, dextrose 10%, DOPamine, glucagon (human recombinant), insulin aspart U-100, nitroGLYCERIN, ondansetron, tirofiban-0.9% sodium chloride 12.5 mg/250ml    Physical Findings/Assessment         Estimated/Assessed Needs    Weight Used For Calorie Calculations: 88 kg (194 lb 0.1 oz)  Energy Calorie Requirements (kcal): 9836-9357 g (MSJ x 1.2-1.4 AF (Diabetes)  Energy Need Method: [facility]  Protein Requirements:  g (1.0-1.5 g/kg ABW (Diabetic w/ wounds)  Weight Used For Protein Calculations: 88 kg (194 lb 0.1 oz)  Fluid Requirements (mL): 5143-7032 mL (1 mL/kcal)  Estimated Fluid Requirement Method: RDA Method  RDA Method (mL): 1993  CHO Requirement: 249-290 g (5053-9721 kcal/8)      Nutrition Prescription Ordered    Current Diet Order: Cardiac diet    Evaluation of Received Nutrient/Fluid Intake  I/O: (Net since admit)   2/14: +201.7 mL    Energy Calories Required: not meeting needs  Protein Required: not meeting needs  Fluid Required: exceeds needs  Total Fluid Intake (mL): 2836.7  Tolerance: tolerating  % Intake of Estimated Energy Needs: 0 - 25 %  % Meal Intake: 0 - 25 %    Nutrition Risk    Level of Risk/Frequency of Follow-up: high (F/u x 2 weekly)       Monitor and Evaluation    Food and Nutrient Intake: energy intake, food and beverage intake  Food and Nutrient Adminstration: diet order  Knowledge/Beliefs/Attitudes: food and nutrition knowledge/skill, beliefs and attitudes  Anthropometric Measurements: weight, weight change, body mass index  Biochemical Data, Medical Tests and Procedures: electrolyte and renal panel, glucose/endocrine profile, inflammatory profile       Nutrition Follow-Up    RD Follow-up?: Yes  Christel Arroyo, Registration Eligible, Provisional LDN

## 2024-02-14 NOTE — ASSESSMENT & PLAN NOTE
- Trop 1.063 and has trended up after admission yesterday; cards following   - Taken for LHC/PCTA 2/13 for NSTEMI with stent x3 to LAD and IABP; Initiated on Aggrastat gtt; transferred to ICU in stable condition  - Continue ASA, Brilinta, statin, BB; Continuous cardiac monitoring   - TTE with EF 60-65%  - 2/14 - IABP removed, Aggrastat/Heparin gtt d/c'ed  - Cardiac diet  - PT/OT ordered; bed rest after IABP removal until this afternoon

## 2024-02-14 NOTE — CONSULTS
[facility] - [facility] (H[facility])  Critical Care Medicine  Consult Note    Patient Name: Tramaine Resendiz  MRN: [id number]  Admission Date: 2/12/2024  Hospital Length of Stay: 0 days  Code Status: Full Code  Attending Physician: Luis Fernando Palmer MD   Primary Care Provider: Jeane Doyle MD   Principal Problem: NSTEMI (non-ST elevated myocardial infarction)    Subjective:     HPI:  Mr. Resendiz is a 68-year-old male with past medical history of HTN, HLD, and T2DM who presented to the ED yesterday (2/12) for worsening chest pain. He was visiting his PCP for evaluation of hip pain and developed L-sided chest pain with associated radiation to L shoulder, mild shortness of breath, and diaphoresis. He denies any enticing injury or trauma. The pain resolved without any intervention but was sent to ED by PCP. He denies any cardiac history or other medical problems not mentioned above. Denies any similar symptoms in the past. No chest pain/shortness of breath upon arrival to ED. No ST elevation on initial EKG, trop 1.063. Repeat troponin 1.370. glucose 243. Cardiology consulted. Started on heparin gtt. Admitted to hospital medicine for treatment of NSTEMI. Patient taken for LHC/PCTA today. LAD treated with stentx3. RCA occluded, treated with stent but subsequent thrombosis needing balloon angioplasty and placed on Aggrastat gtt. Critical care was consulted for continuous cardiac monitoring. Patient on 4L NC. Denies any chest pain on interview.     Past Medical History:   Diagnosis Date    Hypertension     Type 2 diabetes mellitus without complications 2012    BS didn't check 11/28/2022     Past Surgical History:   Procedure Laterality Date    HERNIA REPAIR       Review of patient's allergies indicates:   Allergen Reactions    Tomato      Family History       Problem Relation (Age of Onset)    Diabetes Mother    Hypertension Mother     Tobacco Use    Smoking status: Former     Types: Cigarettes    Smokeless tobacco: Never    Substance and Sexual Activity    Alcohol use: Yes    Drug use: Not on file    Sexual activity: Not on file       Review of Systems   Constitutional:  Negative for diaphoresis and fever.   Respiratory:  Negative for chest tightness and shortness of breath.    Cardiovascular:  Negative for chest pain and palpitations.   Gastrointestinal:  Negative for abdominal pain, nausea and vomiting.   Genitourinary:  Negative for difficulty urinating and dysuria.   Musculoskeletal:  Positive for arthralgias.   Neurological:  Negative for dizziness and headaches.   Psychiatric/Behavioral:  The patient is not nervous/anxious.      Objective:     Vital Signs (Most Recent):  Temp: 97.8 °F (36.6 °C) (02/13/24 1121)  Pulse: 74 (02/13/24 1300)  Resp: 17 (02/13/24 1121)  BP: (!) 90/59 (02/13/24 1121)  SpO2: (!) 94 % (02/13/24 1121) Vital Signs (24h Range):  Temp:  [97.8 °F (36.6 °C)-98.3 °F (36.8 °C)] 97.8 °F (36.6 °C)  Pulse:  [66-84] 74  Resp:  [12-22] 17  SpO2:  [94 %-100 %] 94 %  BP: ()/() 90/59     Weight: 83.9 kg (185 lb)  Body mass index is 26.54 kg/m².  Intake/Output Summary (Last 24 hours) at 2/13/2024 1825  Last data filed at 2/13/2024 1118  Gross per 24 hour   Intake --   Output 300 ml   Net -300 ml     Physical Exam  Constitutional:       General: He is not in acute distress.     Appearance: He is not ill-appearing.   Eyes:      General: No scleral icterus.     Extraocular Movements: Extraocular movements intact.   Cardiovascular:      Rate and Rhythm: Normal rate.      Pulses: Normal pulses.      Heart sounds: Murmur heard.   Pulmonary:      Effort: Pulmonary effort is normal.   Abdominal:      General: Abdomen is flat. There is no distension.      Palpations: Abdomen is soft.      Tenderness: There is no abdominal tenderness.   Skin:     General: Skin is warm.      Capillary Refill: Capillary refill takes less than 2 seconds.      Coloration: Skin is not jaundiced.   Neurological:      Mental Status: He is  alert and oriented to person, place, and time.   Psychiatric:         Mood and Affect: Mood normal.         Behavior: Behavior normal.     Lines/Drains/Airways       Drain  Duration                  Urethral Catheter 02/13/24 1702 Silicone 16 Fr. <1 day              Peripheral Intravenous Line  Duration                  Peripheral IV - Single Lumen 02/12/24 1734 20 G Right Forearm 1 day         Peripheral IV - Single Lumen 02/12/24 1958 20 G Anterior;Left Forearm <1 day                  Significant Labs:    CBC/Anemia Profile:  Recent Labs   Lab 02/12/24  1734 02/13/24  0125 02/13/24  1142   WBC 10.99 8.53  --    HGB 14.3 12.4* 11.2*   HCT 44.0 38.0* 35.2*    255  --    MCV 80* 80*  --    RDW 13.8 13.8  --      Chemistries:  Recent Labs   Lab 02/12/24 1734 02/13/24  0757    139   K 4.1 3.8    107   CO2 20* 21*   BUN 15 17   CREATININE 1.1 0.9   CALCIUM 9.3 8.8   ALBUMIN 3.9  --    PROT 7.7  --    BILITOT 0.3  --    ALKPHOS 96  --    ALT 17  --    AST 25  --      A1C:   Recent Labs   Lab 02/12/24  2037   HGBA1C 11.7*     POCT Glucose:   Recent Labs   Lab 02/13/24  0200 02/13/24  0446 02/13/24  1116   POCTGLUCOSE 203* 202* 168*     Significant Imaging:   I have reviewed all pertinent imaging results/findings within the past 24 hours.  I have reviewed and interpreted all pertinent imaging results/findings within the past 24 hours.    Imaging Results              X-Ray Chest AP Portable (Final result)  Result time 02/12/24 18:29:21      Final result by Felice Colmenares MD (02/12/24 18:29:21)                   Impression:      No acute abnormality.      Electronically signed by: Felice Colmenares  Date:    02/12/2024  Time:    18:29               Narrative:    EXAMINATION:  XR CHEST AP PORTABLE    CLINICAL HISTORY:  chest pain;    TECHNIQUE:  Single frontal view of the chest was performed.    COMPARISON:  None    FINDINGS:  The lungs are clear, with normal appearance of pulmonary vasculature and no pleural  effusion or pneumothorax.    The cardiac silhouette is normal in size. The hilar and mediastinal contours are unremarkable.    Bones are intact.                                     Assessment/Plan:     Cardiac/Vascular  * NSTEMI (non-ST elevated myocardial infarction)  - Trop 1.063 and has trended up  - Cardiology is following  - Taken for LHC/PCTA today with stent x3 to LAD and IABP  - Initiated on Aggrastat gtt; transferred to ICU in stable condition  - Continue ASA, Brilinta, statin   - Continuous cardiac monitoring   - TTE with EF 60-65%  - Will resume home BB if BP permits    Hypertension  - Chronic, uncontrolled. Latest blood pressure and vitals reviewed-   Temp:  [97.8 °F (36.6 °C)-98.3 °F (36.8 °C)]   Pulse:  []   Resp:  [12-22]   BP: ()/(55-97)   SpO2:  [94 %-100 %]   Arterial Line BP: (122-140)/(65-89) .   - Home meds for hypertension were reviewed and noted below.   - While in the hospital, will manage blood pressure as follows; Continue home antihypertensive regimen    Hyperlipidemia associated with type 2 diabetes mellitus  - Resume Pravastatin 80mg daily    Endocrine  Type 2 diabetes mellitus  - Patient's FSGs are uncontrolled due to hyperglycemia on current medication regimen  - Last A1c reviewed:  Lab Results   Component Value Date    HGBA1C 11.7 (H) 02/12/2024   - Most recent fingerstick glucose reviewed:  Recent Labs   Lab 02/13/24  0200 02/13/24  0446 02/13/24  1116   POCTGLUCOSE 203* 202* 168*   - Current correctional scale:  Medium  - Maintain anti-hyperglycemic dose as follows:   Antihyperglycemics (From admission, onward)      Start     Stop Route Frequency Ordered    02/13/24 1220  insulin aspart U-100 pen 0-10 Units         -- SubQ Before meals & nightly PRN 02/13/24 1121    02/13/24 0200  insulin detemir U-100 (Levemir) pen 10 Units         -- SubQ Nightly 02/13/24 0157   - Hold Oral hypoglycemics while patient is in the hospital    Critical Care Time: 51 minutes  Critical  secondary to Patient has a condition that poses threat to life and bodily function: Continuous cardiac monitoring      Critical care was time spent personally by me on the following activities: development of treatment plan with patient or surrogate and bedside caregivers, discussions with consultants, evaluation of patient's response to treatment, examination of patient, ordering and performing treatments and interventions, ordering and review of laboratory studies, ordering and review of radiographic studies, pulse oximetry, re-evaluation of patient's condition. This critical care time did not overlap with that of any other provider or involve time for any procedures.    Thank you for your consult. I will follow-up with patient. Please contact us if you have any additional questions.     Erasmo Ambrocio PA-C  Critical Care Medicine  '[place] - Intensive Care ([facility])

## 2024-02-14 NOTE — PLAN OF CARE
Problem: Adult Inpatient Plan of Care  Goal: Plan of Care Review  Outcome: Ongoing, Progressing  Pt remains AAOx4, no complaints of pain or discomfort. Afebrile. NSR on monitor. BP stable, dopaine gtt weaned off per Dr. Orellana order. Precedex gtt infusing for RASS 0. PIVs also infusing Aggrastat and Heparin gtts per order. 45-100ml/hr urine output via fuentes cath. Pt assisted with weight shift adjustments q2hrs, HOB kept flat due to IABP. POC reviewed with pt, verbalized understanding but needs reinforcement. Will continue with current POC and update as needed.

## 2024-02-14 NOTE — HOSPITAL COURSE
2/14/24: Doing well this morning. No chest pain/shortness of breath. Balloon pump pulled. BP stable. K 3.6 and mag 1.8.

## 2024-02-14 NOTE — PT/OT/SLP PROGRESS
Physical Therapy      Patient Name:  Tramaine Resendiz   MRN:  [id number]    P.ARIAN JUARES INITIATED THIS AM VIA CHART REVIEW, WILL HOLD AT THIS TIME PER MD ORDER, PT IS UNABLE TO BE SEEN UNTIL AFTER 2PM TODAY, WILL CONTINUE EFFORTS    Hilary Luciano, PT  2/14/2024  102

## 2024-02-14 NOTE — ASSESSMENT & PLAN NOTE
- Chronic, uncontrolled. Latest blood pressure and vitals reviewed-   Temp:  [97.8 °F (36.6 °C)-98.7 °F (37.1 °C)]   Pulse:  []   Resp:  [0-18]   BP: ()/()   SpO2:  [94 %-100 %]   Arterial Line BP: (101-153)/(45-89) .   - Home meds for hypertension were reviewed and noted below.   - While in the hospital, will manage blood pressure as follows; Continue home antihypertensive regimen - BB

## 2024-02-14 NOTE — PLAN OF CARE
Received pt from cath lab 1745 with IABP in place; tolerating well. Pt AAOx 4. GCS 15. Agitation and restlessness noted; order received for Precedex to maintain RASS 0; effective. [facility] with stent placement completed today; L radial site with TR band and armboard in place with no bleeding/hematoma; R groin site sealed with angioseal, gauze, and tegaderm with no bleeding/hematoma. IABP in place to L femoral site, with 1:1 setting; leg in immobilizer. VSS. Afebrile. SR on monitor. 4LNC. Reynolds in place; 1300mL UOP for shift. No BM this shift. Accuchecks Q6. Family updated at the bedside.    Pt resting comfortably in bed with side rails up x3; locked and lowered with alarm set. Call light in place. Advised pt to call out if anything is needed. Pt verbalized understanding.

## 2024-02-14 NOTE — HOSPITAL COURSE
2-14-24 s/p PCI of RCA and LAD, no c/o, no CP, has IABP, will pull out this am, continue statin, metoprolol, asa, brilenta    2-15-24  s/p PCI of RCA and LAD, no c/o, no CP, can go home today on  statin, metoprolol, asa, brilenta , follow up CV clinic 2-4 weeks

## 2024-02-14 NOTE — SUBJECTIVE & OBJECTIVE
Objective:     Vital Signs (Most Recent):  Temp: 98.6 °F (37 °C) (02/14/24 0315)  Pulse: 76 (02/14/24 0600)  Resp: 16 (02/14/24 0600)  BP: 123/85 (02/14/24 0630)  SpO2: 99 % (02/14/24 0600) Vital Signs (24h Range):  Temp:  [97.8 °F (36.6 °C)-98.7 °F (37.1 °C)] 98.6 °F (37 °C)  Pulse:  [] 76  Resp:  [0-18] 16  SpO2:  [94 %-100 %] 99 %  BP: ()/() 123/85  Arterial Line BP: (101-153)/(45-89) 151/53     Weight: 88 kg (194 lb 0.1 oz)  Body mass index is 27.84 kg/m².  Intake/Output Summary (Last 24 hours) at 2/14/2024 0851  Last data filed at 2/14/2024 0600  Gross per 24 hour   Intake 2836.71 ml   Output 2635 ml   Net 201.71 ml     Physical Exam  Constitutional:       General: He is not in acute distress.     Appearance: He is ill-appearing.   HENT:      Head: Normocephalic.   Eyes:      Extraocular Movements: Extraocular movements intact.      Conjunctiva/sclera: Conjunctivae normal.   Cardiovascular:      Rate and Rhythm: Normal rate.      Heart sounds: Murmur heard.   Pulmonary:      Effort: Pulmonary effort is normal.      Breath sounds: Normal breath sounds. No wheezing or rales.   Abdominal:      General: There is no distension.      Palpations: Abdomen is soft.      Tenderness: There is no abdominal tenderness.   Musculoskeletal:         General: No swelling.   Skin:     General: Skin is warm.      Capillary Refill: Capillary refill takes less than 2 seconds.      Coloration: Skin is not jaundiced.   Neurological:      Mental Status: He is alert and oriented to person, place, and time.   Psychiatric:         Mood and Affect: Mood normal.         Behavior: Behavior normal.         Thought Content: Thought content normal.         Judgment: Judgment normal.      Review of Systems   Constitutional:  Negative for fatigue and fever.   Respiratory:  Negative for chest tightness and shortness of breath.    Cardiovascular:  Negative for chest pain and palpitations.   Gastrointestinal:  Negative for nausea  "and vomiting.   Genitourinary:  Negative for difficulty urinating and dysuria.   Neurological:  Negative for weakness and headaches.   Psychiatric/Behavioral:  The patient is not nervous/anxious.      Lines/Drains/Airways       Drain  Duration                  Urethral Catheter 02/13/24 1702 Silicone 16 Fr. <1 day              Line  Duration                  IABP 02/13/24 1745 <1 day              Peripheral Intravenous Line  Duration                  Peripheral IV - Single Lumen 02/12/24 1734 20 G Right Forearm 1 day         Peripheral IV - Single Lumen 02/12/24 1958 20 G Anterior;Left Forearm 1 day                  Significant Labs:    CBC/Anemia Profile:  Recent Labs   Lab 02/12/24  1734 02/13/24  0125 02/13/24  1142 02/14/24  0303   WBC 10.99 8.53  --  10.02   HGB 14.3 12.4* 11.2* 11.5*   HCT 44.0 38.0* 35.2* 36.7*    255  --  251   MCV 80* 80*  --  81*   RDW 13.8 13.8  --  14.1     Chemistries:  Recent Labs   Lab 02/12/24  1734 02/13/24  0757 02/14/24  0304    139 142   K 4.1 3.8 3.6    107 111*   CO2 20* 21* 20*   BUN 15 17 14   CREATININE 1.1 0.9 0.9   CALCIUM 9.3 8.8 8.5*   ALBUMIN 3.9  --  3.0*   PROT 7.7  --  6.3   BILITOT 0.3  --  0.4   ALKPHOS 96  --  74   ALT 17  --  21   AST 25  --  100*   MG  --   --  1.8     Cardiac Markers: No results for input(s): "CKMB", "TROPONINT", "MYOGLOBIN" in the last 48 hours.  Coagulation:   Recent Labs   Lab 02/12/24  1831 02/13/24  0128 02/14/24  0303   INR 1.0  --   --    APTT  --    < > 64.1*    < > = values in this interval not displayed.     Significant Imaging:  I have reviewed all pertinent imaging results/findings within the past 24 hours.  I have reviewed and interpreted all pertinent imaging results/findings within the past 24 hours.  "

## 2024-02-14 NOTE — ASSESSMENT & PLAN NOTE
- Patient's FSGs are uncontrolled due to hyperglycemia on current medication regimen  - Last A1c reviewed:  Lab Results   Component Value Date    HGBA1C 11.7 (H) 02/12/2024   - Most recent fingerstick glucose reviewed:  Recent Labs   Lab 02/13/24  1116 02/13/24  2018 02/14/24  0906   POCTGLUCOSE 168* 199* 166*     - Current correctional scale:  Medium  - Maintain anti-hyperglycemic dose as follows:   Antihyperglycemics (From admission, onward)      Start     Stop Route Frequency Ordered    02/13/24 1220  insulin aspart U-100 pen 0-10 Units         -- SubQ Before meals & nightly PRN 02/13/24 1121    02/13/24 0200  insulin detemir U-100 (Levemir) pen 10 Units         -- SubQ Nightly 02/13/24 0157   - Hold Oral hypoglycemics while patient is in the hospital

## 2024-02-14 NOTE — PROGRESS NOTES
[facility] ([facility])  Critical Care Medicine  Progress Note    Patient Name: Tarmaine Resendiz  MRN: [id number]  Admission Date: 2/12/2024  Hospital Length of Stay: 1 days  Code Status: Full Code  Attending Provider: Luis Fernando Palmer MD  Primary Care Provider: Jeane Doyle MD   Principal Problem: NSTEMI (non-ST elevated myocardial infarction)    Subjective:     HPI:  Mr. Resendiz is a 68-year-old male with past medical history of HTN, HLD, and T2DM who presented to the ED yesterday (2/12) for worsening chest pain. He was visiting his PCP for evaluation of hip pain and developed L-sided chest pain with associated radiation to L shoulder, mild shortness of breath, and diaphoresis. He denies any enticing injury or trauma. The pain resolved without any intervention but was sent to ED by PCP. He denies any cardiac history or other medical problems not mentioned above. Denies any similar symptoms in the past. No chest pain/shortness of breath upon arrival to ED. No ST elevation on initial EKG, trop 1.063. Repeat troponin 1.370. glucose 243. Cardiology consulted. Started on heparin gtt. Admitted to hospital medicine for treatment of NSTEMI. Patient taken for LHC/PCTA today. LAD treated with stentx3. RCA occluded, treated with stent but subsequent thrombosis needing balloon angioplasty and placed on Aggrastat gtt. Critical care was consulted for continuous cardiac monitoring. Patient on 4L NC. Denies any chest pain on interview.     Hospital/ICU Course:  2/14/24: Doing well this morning. No chest pain/shortness of breath. Balloon pump pulled. BP stable. K 3.6 and mag 1.8.    Objective:     Vital Signs (Most Recent):  Temp: 98.6 °F (37 °C) (02/14/24 0315)  Pulse: 76 (02/14/24 0600)  Resp: 16 (02/14/24 0600)  BP: 123/85 (02/14/24 0630)  SpO2: 99 % (02/14/24 0600) Vital Signs (24h Range):  Temp:  [97.8 °F (36.6 °C)-98.7 °F (37.1 °C)] 98.6 °F (37 °C)  Pulse:  [] 76  Resp:  [0-18] 16  SpO2:  [94 %-100 %] 99  %  BP: ()/() 123/85  Arterial Line BP: (101-153)/(45-89) 151/53     Weight: 88 kg (194 lb 0.1 oz)  Body mass index is 27.84 kg/m².  Intake/Output Summary (Last 24 hours) at 2/14/2024 0851  Last data filed at 2/14/2024 0600  Gross per 24 hour   Intake 2836.71 ml   Output 2635 ml   Net 201.71 ml     Physical Exam  Constitutional:       General: He is not in acute distress.     Appearance: He is ill-appearing.   HENT:      Head: Normocephalic.   Eyes:      Extraocular Movements: Extraocular movements intact.      Conjunctiva/sclera: Conjunctivae normal.   Cardiovascular:      Rate and Rhythm: Normal rate.      Heart sounds: Murmur heard.   Pulmonary:      Effort: Pulmonary effort is normal.      Breath sounds: Normal breath sounds. No wheezing or rales.   Abdominal:      General: There is no distension.      Palpations: Abdomen is soft.      Tenderness: There is no abdominal tenderness.   Musculoskeletal:         General: No swelling.   Skin:     General: Skin is warm.      Capillary Refill: Capillary refill takes less than 2 seconds.      Coloration: Skin is not jaundiced.   Neurological:      Mental Status: He is alert and oriented to person, place, and time.   Psychiatric:         Mood and Affect: Mood normal.         Behavior: Behavior normal.         Thought Content: Thought content normal.         Judgment: Judgment normal.      Review of Systems   Constitutional:  Negative for fatigue and fever.   Respiratory:  Negative for chest tightness and shortness of breath.    Cardiovascular:  Negative for chest pain and palpitations.   Gastrointestinal:  Negative for nausea and vomiting.   Genitourinary:  Negative for difficulty urinating and dysuria.   Neurological:  Negative for weakness and headaches.   Psychiatric/Behavioral:  The patient is not nervous/anxious.      Lines/Drains/Airways       Drain  Duration                  Urethral Catheter 02/13/24 1702 Silicone 16 Fr. <1 day              Line   "Duration                  IABP 02/13/24 1745 <1 day              Peripheral Intravenous Line  Duration                  Peripheral IV - Single Lumen 02/12/24 1734 20 G Right Forearm 1 day         Peripheral IV - Single Lumen 02/12/24 1958 20 G Anterior;Left Forearm 1 day                  Significant Labs:    CBC/Anemia Profile:  Recent Labs   Lab 02/12/24  1734 02/13/24  0125 02/13/24  1142 02/14/24  0303   WBC 10.99 8.53  --  10.02   HGB 14.3 12.4* 11.2* 11.5*   HCT 44.0 38.0* 35.2* 36.7*    255  --  251   MCV 80* 80*  --  81*   RDW 13.8 13.8  --  14.1     Chemistries:  Recent Labs   Lab 02/12/24  1734 02/13/24  0757 02/14/24  0304    139 142   K 4.1 3.8 3.6    107 111*   CO2 20* 21* 20*   BUN 15 17 14   CREATININE 1.1 0.9 0.9   CALCIUM 9.3 8.8 8.5*   ALBUMIN 3.9  --  3.0*   PROT 7.7  --  6.3   BILITOT 0.3  --  0.4   ALKPHOS 96  --  74   ALT 17  --  21   AST 25  --  100*   MG  --   --  1.8     Cardiac Markers: No results for input(s): "CKMB", "TROPONINT", "MYOGLOBIN" in the last 48 hours.  Coagulation:   Recent Labs   Lab 02/12/24  1831 02/13/24  0128 02/14/24  0303   INR 1.0  --   --    APTT  --    < > 64.1*    < > = values in this interval not displayed.     Significant Imaging:  I have reviewed all pertinent imaging results/findings within the past 24 hours.  I have reviewed and interpreted all pertinent imaging results/findings within the past 24 hours.    Assessment/Plan:     Cardiac/Vascular  * NSTEMI (non-ST elevated myocardial infarction)  - Trop 1.063 and has trended up after admission yesterday; cards following   - Taken for LHC/PCTA 2/13 for NSTEMI with stent x3 to LAD and IABP; Initiated on Aggrastat gtt; transferred to ICU in stable condition  - Continue ASA, Brilinta, statin, BB; Continuous cardiac monitoring   - TTE with EF 60-65%  - 2/14 - IABP removed, Aggrastat/Heparin gtt d/c'ed  - Cardiac diet  - PT/OT ordered; bed rest after IABP removal until this afternoon "     Hypertension  - Chronic, uncontrolled. Latest blood pressure and vitals reviewed-   Temp:  [97.8 °F (36.6 °C)-98.7 °F (37.1 °C)]   Pulse:  []   Resp:  [0-18]   BP: ()/()   SpO2:  [94 %-100 %]   Arterial Line BP: (101-153)/(45-89) .   - Home meds for hypertension were reviewed and noted below.   - While in the hospital, will manage blood pressure as follows; Continue home antihypertensive regimen - BB    Hyperlipidemia associated with type 2 diabetes mellitus  - Pravastatin 80mg daily    Endocrine  Type 2 diabetes mellitus  - Patient's FSGs are uncontrolled due to hyperglycemia on current medication regimen  - Last A1c reviewed:  Lab Results   Component Value Date    HGBA1C 11.7 (H) 02/12/2024   - Most recent fingerstick glucose reviewed:  Recent Labs   Lab 02/13/24  1116 02/13/24 2018 02/14/24  0906   POCTGLUCOSE 168* 199* 166*     - Current correctional scale:  Medium  - Maintain anti-hyperglycemic dose as follows:   Antihyperglycemics (From admission, onward)      Start     Stop Route Frequency Ordered    02/13/24 1220  insulin aspart U-100 pen 0-10 Units         -- SubQ Before meals & nightly PRN 02/13/24 1121    02/13/24 0200  insulin detemir U-100 (Levemir) pen 10 Units         -- SubQ Nightly 02/13/24 0157   - Hold Oral hypoglycemics while patient is in the hospital    Critical Care Time: 32 minutes  Critical secondary to Patient has a condition that poses threat to life and bodily function: Acute Myocardial Infarction      Critical care was time spent personally by me on the following activities: development of treatment plan with patient or surrogate and bedside caregivers, discussions with consultants, evaluation of patient's response to treatment, examination of patient, ordering and performing treatments and interventions, ordering and review of laboratory studies, ordering and review of radiographic studies, pulse oximetry, re-evaluation of patient's condition. This critical care  time did not overlap with that of any other provider or involve time for any procedures.     Erasmo Ambrocio PA-C  Critical Care Medicine  [facility] ([facility])

## 2024-02-14 NOTE — PROGRESS NOTES
[facility] ([facility])  Cardiology  Progress Note    Patient Name: Tramaine Resendiz  MRN: [id number]  Admission Date: 2/12/2024  Hospital Length of Stay: 1 days  Code Status: Full Code   Attending Physician: Luis Fernando Palmer MD   Primary Care Physician: Jeane Doyle MD  Expected Discharge Date:   Principal Problem:NSTEMI (non-ST elevated myocardial infarction)    Subjective:     Hospital Course:   2-14-24 s/p PCI of RCA and LAD, no c/o, no CP, has IABP, will pull out this am, continue statin, metoprolol, asa, brilenta    Interval History:     Review of Systems   Constitutional: Negative. Negative for weight gain.   HENT: Negative.     Eyes: Negative.    Cardiovascular: Negative.  Negative for chest pain, leg swelling and palpitations.   Respiratory: Negative.  Negative for shortness of breath.    Endocrine: Negative.    Hematologic/Lymphatic: Negative.    Skin: Negative.    Musculoskeletal:  Negative for muscle weakness.   Gastrointestinal: Negative.    Genitourinary: Negative.    Neurological: Negative.  Negative for dizziness.   Psychiatric/Behavioral: Negative.     Allergic/Immunologic: Negative.    All other systems reviewed and are negative.    Objective:     Vital Signs (Most Recent):  Temp: 98.6 °F (37 °C) (02/14/24 0315)  Pulse: 76 (02/14/24 0600)  Resp: 16 (02/14/24 0600)  BP: 123/85 (02/14/24 0630)  SpO2: 99 % (02/14/24 0600) Vital Signs (24h Range):  Temp:  [97.8 °F (36.6 °C)-98.7 °F (37.1 °C)] 98.6 °F (37 °C)  Pulse:  [] 76  Resp:  [0-18] 16  SpO2:  [94 %-100 %] 99 %  BP: ()/() 123/85  Arterial Line BP: (101-153)/(45-89) 151/53     Weight: 88 kg (194 lb 0.1 oz)  Body mass index is 27.84 kg/m².     SpO2: 99 %         Intake/Output Summary (Last 24 hours) at 2/14/2024 0837  Last data filed at 2/14/2024 0600  Gross per 24 hour   Intake 2836.71 ml   Output 2635 ml   Net 201.71 ml       Lines/Drains/Airways       Drain       Name Duration         Urethral Catheter 02/13/24  1702 Silicone 16 Fr. <1 day              Line       Name Duration         IABP 02/13/24 1745 <1 day              Peripheral Intravenous Line       Name Duration         Peripheral IV - Single Lumen 02/12/24 1734 20 G Right Forearm 1 day         Peripheral IV - Single Lumen 02/12/24 1958 20 G Anterior;Left Forearm 1 day                       Physical Exam  Vitals and nursing note reviewed.   Constitutional:       Appearance: He is well-developed.   HENT:      Head: Normocephalic and atraumatic.   Eyes:      Conjunctiva/sclera: Conjunctivae normal.      Pupils: Pupils are equal, round, and reactive to light.   Cardiovascular:      Rate and Rhythm: Normal rate and regular rhythm.      Pulses: Intact distal pulses.      Heart sounds: Normal heart sounds.   Pulmonary:      Effort: Pulmonary effort is normal.      Breath sounds: Normal breath sounds.   Abdominal:      General: Bowel sounds are normal.      Palpations: Abdomen is soft.   Musculoskeletal:      Cervical back: Normal range of motion and neck supple.   Skin:     General: Skin is warm and dry.   Neurological:      Mental Status: He is alert and oriented to person, place, and time.            Significant Labs: All pertinent lab results from the last 24 hours have been reviewed.    Significant Imaging: X-Ray: CXR: X-Ray Chest 1 View (CXR):   Results for orders placed or performed during the hospital encounter of 02/12/24   X-Ray Chest 1 View    Narrative    EXAMINATION:  XR CHEST 1 VIEW    CLINICAL HISTORY:  IABP placement;    FINDINGS:  Comparison: February 12, 2024    Tip of the intra-aortic balloon pump overlies the aortic arch.    Mediastinal silhouette is within normal limits.  The lungs are clear.  No pneumothorax or pleural effusion.  No acute osseous finding.      Impression    Tip of the intra-aortic balloon pump overlies the aortic arch.      Electronically signed by: Ramirez Andino  Date:    02/13/2024  Time:    20:31     Assessment and Plan:     Brief  HPI:     * NSTEMI (non-ST elevated myocardial infarction)  -Presents with typical CP symptoms/troponin elevation  -Consistent with NSTEMI  -Stable/CP free during exam  -Continue ASA, statin, heparin gtt  -No  BB given borderline BP  -Nitrates prn  -TTE pending  -[facility] today. All risks, benefits, and treatment alternatives explained to patient in detail. All questions answered. He has agreed to proceed.    Hypertension  -BP soft  -Will optimize regimen as tolerated    Hyperlipidemia associated with type 2 diabetes mellitus  -Statin    Type 2 diabetes mellitus  -Mgmt as per hospital medicine        VTE Risk Mitigation (From admission, onward)           Ordered     IP VTE LOW RISK PATIENT  Once         02/12/24 2018     Reason for no Mechanical VTE Prophylaxis  Once        Question:  Reasons:  Answer:  Physician Provided (leave comment)  Comment:  on heparin drip    02/12/24 2018     Place sequential compression device  Until discontinued         02/12/24 2018     heparin 25,000 units in dextrose 5% (100 units/ml) IV bolus from bag LOW INTENSITY nomogram - [facility]  As needed (PRN)        Question:  Heparin Infusion Adjustment (DO NOT MODIFY ANSWER)  Answer:  [email or web address]\Images\[email or web address]\HeparinInfusions\heparin LOW INTENSITY nomogram for [facility] [id number]    02/12/24 1819     heparin 25,000 units in dextrose 5% (100 units/ml) IV bolus from bag LOW INTENSITY nomogram - [facility]  As needed (PRN)        Question:  Heparin Infusion Adjustment (DO NOT MODIFY ANSWER)  Answer:  [email or web address]\Images\Pharmacy\HeparinInfusions\heparin LOW INTENSITY nomogram for [facility] [id number]    02/12/24 1819     heparin 25,000 units in dextrose 5% 250 mL (100 units/mL) infusion LOW INTENSITY nomogram - [facility]  Continuous        Question:  Begin at (units/kg/hr)  Answer:  12    02/12/24 1819                    Jesus Varma MD  Cardiology  [facility] ([facility])

## 2024-02-14 NOTE — HPI
Mr. Resendiz is a 68-year-old male with past medical history of HTN, HLD, and T2DM who presented to the ED yesterday (2/12) for worsening chest pain. He was visiting his PCP for evaluation of hip pain and developed L-sided chest pain with associated radiation to L shoulder, mild shortness of breath, and diaphoresis. He denies any enticing injury or trauma. The pain resolved without any intervention but was sent to ED by PCP. He denies any cardiac history or other medical problems not mentioned above. Denies any similar symptoms in the past. No chest pain/shortness of breath upon arrival to ED. No ST elevation on initial EKG, trop 1.063. Repeat troponin 1.370. glucose 243. Cardiology consulted. Started on heparin gtt. Admitted to hospital medicine for treatment of NSTEMI. Patient taken for LHC/PCTA today. LAD treated with stentx3. RCA occluded, treated with stent but subsequent thrombosis needing balloon angioplasty and placed on Aggrastat gtt. Critical care was consulted for continuous cardiac monitoring. Patient on 4L NC. Denies any chest pain on interview.

## 2024-02-14 NOTE — PT/OT/SLP PROGRESS
Occupational Therapy      Patient Name:  Tramaine Resendiz   MRN:  [id number]    OT eval orders received. Chart review completed. Patient with lie flat orders until 1400. Will continue efforts.    Rosalba Reinoso, OT  2/14/2024  1208

## 2024-02-14 NOTE — SUBJECTIVE & OBJECTIVE
Past Medical History:   Diagnosis Date    Hypertension     Type 2 diabetes mellitus without complications 2012    BS didn't check 11/28/2022     Past Surgical History:   Procedure Laterality Date    HERNIA REPAIR       Review of patient's allergies indicates:   Allergen Reactions    Tomato      Family History       Problem Relation (Age of Onset)    Diabetes Mother    Hypertension Mother     Tobacco Use    Smoking status: Former     Types: Cigarettes    Smokeless tobacco: Never   Substance and Sexual Activity    Alcohol use: Yes    Drug use: Not on file    Sexual activity: Not on file       Review of Systems   Constitutional:  Negative for diaphoresis and fever.   Respiratory:  Negative for chest tightness and shortness of breath.    Cardiovascular:  Negative for chest pain and palpitations.   Gastrointestinal:  Negative for abdominal pain, nausea and vomiting.   Genitourinary:  Negative for difficulty urinating and dysuria.   Musculoskeletal:  Positive for arthralgias.   Neurological:  Negative for dizziness and headaches.   Psychiatric/Behavioral:  The patient is not nervous/anxious.      Objective:     Vital Signs (Most Recent):  Temp: 97.8 °F (36.6 °C) (02/13/24 1121)  Pulse: 74 (02/13/24 1300)  Resp: 17 (02/13/24 1121)  BP: (!) 90/59 (02/13/24 1121)  SpO2: (!) 94 % (02/13/24 1121) Vital Signs (24h Range):  Temp:  [97.8 °F (36.6 °C)-98.3 °F (36.8 °C)] 97.8 °F (36.6 °C)  Pulse:  [66-84] 74  Resp:  [12-22] 17  SpO2:  [94 %-100 %] 94 %  BP: ()/() 90/59     Weight: 83.9 kg (185 lb)  Body mass index is 26.54 kg/m².  Intake/Output Summary (Last 24 hours) at 2/13/2024 1825  Last data filed at 2/13/2024 1118  Gross per 24 hour   Intake --   Output 300 ml   Net -300 ml     Physical Exam  Constitutional:       General: He is not in acute distress.     Appearance: He is not ill-appearing.   Eyes:      General: No scleral icterus.     Extraocular Movements: Extraocular movements intact.   Cardiovascular:       Rate and Rhythm: Normal rate.      Pulses: Normal pulses.      Heart sounds: Murmur heard.   Pulmonary:      Effort: Pulmonary effort is normal.   Abdominal:      General: Abdomen is flat. There is no distension.      Palpations: Abdomen is soft.      Tenderness: There is no abdominal tenderness.   Skin:     General: Skin is warm.      Capillary Refill: Capillary refill takes less than 2 seconds.      Coloration: Skin is not jaundiced.   Neurological:      Mental Status: He is alert and oriented to person, place, and time.   Psychiatric:         Mood and Affect: Mood normal.         Behavior: Behavior normal.       Lines/Drains/Airways       Drain  Duration                  Urethral Catheter 02/13/24 1702 Silicone 16 Fr. <1 day              Peripheral Intravenous Line  Duration                  Peripheral IV - Single Lumen 02/12/24 1734 20 G Right Forearm 1 day         Peripheral IV - Single Lumen 02/12/24 1958 20 G Anterior;Left Forearm <1 day                  Significant Labs:    CBC/Anemia Profile:  Recent Labs   Lab 02/12/24  1734 02/13/24  0125 02/13/24  1142   WBC 10.99 8.53  --    HGB 14.3 12.4* 11.2*   HCT 44.0 38.0* 35.2*    255  --    MCV 80* 80*  --    RDW 13.8 13.8  --      Chemistries:  Recent Labs   Lab 02/12/24  1734 02/13/24  0757    139   K 4.1 3.8    107   CO2 20* 21*   BUN 15 17   CREATININE 1.1 0.9   CALCIUM 9.3 8.8   ALBUMIN 3.9  --    PROT 7.7  --    BILITOT 0.3  --    ALKPHOS 96  --    ALT 17  --    AST 25  --      A1C:   Recent Labs   Lab 02/12/24  2037   HGBA1C 11.7*     POCT Glucose:   Recent Labs   Lab 02/13/24  0200 02/13/24  0446 02/13/24  1116   POCTGLUCOSE 203* 202* 168*     Significant Imaging:   I have reviewed all pertinent imaging results/findings within the past 24 hours.  I have reviewed and interpreted all pertinent imaging results/findings within the past 24 hours.    Imaging Results              X-Ray Chest AP Portable (Final result)  Result time 02/12/24  18:29:21      Final result by Felice Colmenares MD (02/12/24 18:29:21)                   Impression:      No acute abnormality.      Electronically signed by: Felice Colmenares  Date:    02/12/2024  Time:    18:29               Narrative:    EXAMINATION:  XR CHEST AP PORTABLE    CLINICAL HISTORY:  chest pain;    TECHNIQUE:  Single frontal view of the chest was performed.    COMPARISON:  None    FINDINGS:  The lungs are clear, with normal appearance of pulmonary vasculature and no pleural effusion or pneumothorax.    The cardiac silhouette is normal in size. The hilar and mediastinal contours are unremarkable.    Bones are intact.

## 2024-02-14 NOTE — PROGRESS NOTES
Subjective:       Patient ID: Tramaine Resendiz is a 68 y.o. male.    Chief Complaint: Hip Pain (Patient stated that he started having hip pain about 3 weeks ago. )      HPI  69 y/o male with HTN, HLD, DMII presents to clinic with c/o lower back pain on left side and right lateral hip pain that causes numbness to his right thigh for about 3 weeks.     Patient also reports left sided chest pressure that started this morning with nausea and some shortness of breath. Denies sweats, abdominal pain, vomiting, palpitations, leg swelling. Reports constant and unchanged. Started while he was seated watching TV at home. No known history of MI.     Review of Systems   Constitutional:  Negative for chills, diaphoresis and fever.   Respiratory:  Positive for shortness of breath. Negative for cough, chest tightness and wheezing.    Cardiovascular:  Positive for chest pain. Negative for palpitations and leg swelling.   Gastrointestinal:  Positive for nausea. Negative for abdominal pain and vomiting.   Musculoskeletal:  Positive for back pain and myalgias.       Objective:      Physical Exam  Vitals and nursing note reviewed.   Constitutional:       General: He is not in acute distress.     Appearance: He is well-developed.      Comments: Ambulatory with walking cane   HENT:      Head: Normocephalic and atraumatic.   Eyes:      General: Lids are normal. No scleral icterus.     Extraocular Movements: Extraocular movements intact.      Conjunctiva/sclera: Conjunctivae normal.   Cardiovascular:      Rate and Rhythm: Normal rate and regular rhythm.      Heart sounds: Murmur heard.   Pulmonary:      Effort: Pulmonary effort is normal.      Breath sounds: Normal breath sounds. No decreased breath sounds, wheezing, rhonchi or rales.   Musculoskeletal:        Back:    Neurological:      Mental Status: He is alert.      Cranial Nerves: No cranial nerve deficit.   Psychiatric:         Mood and Affect: Mood and affect normal.          Assessment:       1. Chest pain, unspecified type    2. Nausea    3. Lumbar pain        Plan:   1. Chest pain, unspecified type  -     EKG 12-lead    2. Nausea    3. Lumbar pain        EKG showed NSR with anteroseptal infarct  Symptoms concerning for MI, recommended go directly to ED for cardiac rule out. Patient was agreeable but declined EMS transport and stated he would return home to get his wife and go to ER after that. I encouraged patient to go directly the ER now. He verbalized understanding but stated he would get his wife first. He understands the risk of delaying his care to include cardiac arrest, worsening pain or even death.

## 2024-02-14 NOTE — SUBJECTIVE & OBJECTIVE
Interval History:     Review of Systems   Constitutional: Negative. Negative for weight gain.   HENT: Negative.     Eyes: Negative.    Cardiovascular: Negative.  Negative for chest pain, leg swelling and palpitations.   Respiratory: Negative.  Negative for shortness of breath.    Endocrine: Negative.    Hematologic/Lymphatic: Negative.    Skin: Negative.    Musculoskeletal:  Negative for muscle weakness.   Gastrointestinal: Negative.    Genitourinary: Negative.    Neurological: Negative.  Negative for dizziness.   Psychiatric/Behavioral: Negative.     Allergic/Immunologic: Negative.    All other systems reviewed and are negative.    Objective:     Vital Signs (Most Recent):  Temp: 98.6 °F (37 °C) (02/14/24 0315)  Pulse: 76 (02/14/24 0600)  Resp: 16 (02/14/24 0600)  BP: 123/85 (02/14/24 0630)  SpO2: 99 % (02/14/24 0600) Vital Signs (24h Range):  Temp:  [97.8 °F (36.6 °C)-98.7 °F (37.1 °C)] 98.6 °F (37 °C)  Pulse:  [] 76  Resp:  [0-18] 16  SpO2:  [94 %-100 %] 99 %  BP: ()/() 123/85  Arterial Line BP: (101-153)/(45-89) 151/53     Weight: 88 kg (194 lb 0.1 oz)  Body mass index is 27.84 kg/m².     SpO2: 99 %         Intake/Output Summary (Last 24 hours) at 2/14/2024 0837  Last data filed at 2/14/2024 0600  Gross per 24 hour   Intake 2836.71 ml   Output 2635 ml   Net 201.71 ml       Lines/Drains/Airways       Drain       Name Duration         Urethral Catheter 02/13/24 1702 Silicone 16 Fr. <1 day              Line       Name Duration         IABP 02/13/24 1745 <1 day              Peripheral Intravenous Line       Name Duration         Peripheral IV - Single Lumen 02/12/24 1734 20 G Right Forearm 1 day         Peripheral IV - Single Lumen 02/12/24 1958 20 G Anterior;Left Forearm 1 day                       Physical Exam  Vitals and nursing note reviewed.   Constitutional:       Appearance: He is well-developed.   HENT:      Head: Normocephalic and atraumatic.   Eyes:      Conjunctiva/sclera: Conjunctivae  normal.      Pupils: Pupils are equal, round, and reactive to light.   Cardiovascular:      Rate and Rhythm: Normal rate and regular rhythm.      Pulses: Intact distal pulses.      Heart sounds: Normal heart sounds.   Pulmonary:      Effort: Pulmonary effort is normal.      Breath sounds: Normal breath sounds.   Abdominal:      General: Bowel sounds are normal.      Palpations: Abdomen is soft.   Musculoskeletal:      Cervical back: Normal range of motion and neck supple.   Skin:     General: Skin is warm and dry.   Neurological:      Mental Status: He is alert and oriented to person, place, and time.            Significant Labs: All pertinent lab results from the last 24 hours have been reviewed.    Significant Imaging: X-Ray: CXR: X-Ray Chest 1 View (CXR):   Results for orders placed or performed during the hospital encounter of 02/12/24   X-Ray Chest 1 View    Narrative    EXAMINATION:  XR CHEST 1 VIEW    CLINICAL HISTORY:  IABP placement;    FINDINGS:  Comparison: February 12, 2024    Tip of the intra-aortic balloon pump overlies the aortic arch.    Mediastinal silhouette is within normal limits.  The lungs are clear.  No pneumothorax or pleural effusion.  No acute osseous finding.      Impression    Tip of the intra-aortic balloon pump overlies the aortic arch.      Electronically signed by: Ramirez Andino  Date:    02/13/2024  Time:    20:31

## 2024-02-14 NOTE — ASSESSMENT & PLAN NOTE
- Patient's FSGs are uncontrolled due to hyperglycemia on current medication regimen  - Last A1c reviewed:  Lab Results   Component Value Date    HGBA1C 11.7 (H) 02/12/2024   - Most recent fingerstick glucose reviewed:  Recent Labs   Lab 02/13/24  0200 02/13/24  0446 02/13/24  1116   POCTGLUCOSE 203* 202* 168*   - Current correctional scale:  Medium  - Maintain anti-hyperglycemic dose as follows:   Antihyperglycemics (From admission, onward)      Start     Stop Route Frequency Ordered    02/13/24 1220  insulin aspart U-100 pen 0-10 Units         -- SubQ Before meals & nightly PRN 02/13/24 1121    02/13/24 0200  insulin detemir U-100 (Levemir) pen 10 Units         -- SubQ Nightly 02/13/24 0157   - Hold Oral hypoglycemics while patient is in the hospital

## 2024-02-14 NOTE — ASSESSMENT & PLAN NOTE
- Trop 1.063 and has trended up  - Cardiology is following  - Taken for LHC/PCTA today with stent x3 to LAD and IABP  - Initiated on Aggrastat gtt; transferred to ICU in stable condition  - Continue ASA, Brilinta, statin   - Continuous cardiac monitoring   - TTE with EF 60-65%  - Will resume home BB if BP permits

## 2024-02-14 NOTE — PLAN OF CARE
Nutrition Recommendations 2/12/24:  1. Recommend pt continues on Cardiac diet   2. If PO intake < 50% recommend add Boost glucose control BID   3. Recommend Davy BID for wound healing   4. Nutrition education witll be provided   5. Weight twice weekly  6. Collaboration with other providers     Goals:   1. Pt will tolerate and consume > 75% EEN and EPN prior to RD follow up   2. Pt will consume Davy BID prior to RD follow up   3. Nutrition education will be provided at RD follow up    Christel Arroyo, Registration Eligible, Provisional LDN

## 2024-02-14 NOTE — ASSESSMENT & PLAN NOTE
- Chronic, uncontrolled. Latest blood pressure and vitals reviewed-   Temp:  [97.8 °F (36.6 °C)-98.3 °F (36.8 °C)]   Pulse:  []   Resp:  [12-22]   BP: ()/(55-97)   SpO2:  [94 %-100 %]   Arterial Line BP: (122-140)/(65-89) .   - Home meds for hypertension were reviewed and noted below.   - While in the hospital, will manage blood pressure as follows; Continue home antihypertensive regimen

## 2024-02-15 ENCOUNTER — TELEPHONE (OUTPATIENT)
Dept: CARDIOLOGY | Facility: CLINIC | Age: 69
End: 2024-02-15
Payer: MEDICARE

## 2024-02-15 VITALS
HEART RATE: 85 BPM | TEMPERATURE: 98 F | OXYGEN SATURATION: 97 % | BODY MASS INDEX: 28.02 KG/M2 | SYSTOLIC BLOOD PRESSURE: 138 MMHG | RESPIRATION RATE: 33 BRPM | DIASTOLIC BLOOD PRESSURE: 73 MMHG | WEIGHT: 195.75 LBS | HEIGHT: 70 IN

## 2024-02-15 LAB
ALBUMIN SERPL BCP-MCNC: 2.8 G/DL (ref 3.5–5.2)
ALP SERPL-CCNC: 76 U/L (ref 55–135)
ALT SERPL W/O P-5'-P-CCNC: 20 U/L (ref 10–44)
ANION GAP SERPL CALC-SCNC: 7 MMOL/L (ref 8–16)
AST SERPL-CCNC: 53 U/L (ref 10–40)
BASOPHILS # BLD AUTO: 0.01 K/UL (ref 0–0.2)
BASOPHILS NFR BLD: 0.1 % (ref 0–1.9)
BILIRUB SERPL-MCNC: 0.5 MG/DL (ref 0.1–1)
BUN SERPL-MCNC: 15 MG/DL (ref 8–23)
CALCIUM SERPL-MCNC: 8.5 MG/DL (ref 8.7–10.5)
CHLORIDE SERPL-SCNC: 109 MMOL/L (ref 95–110)
CO2 SERPL-SCNC: 23 MMOL/L (ref 23–29)
CREAT SERPL-MCNC: 1 MG/DL (ref 0.5–1.4)
DIFFERENTIAL METHOD BLD: ABNORMAL
EOSINOPHIL # BLD AUTO: 0 K/UL (ref 0–0.5)
EOSINOPHIL NFR BLD: 0.3 % (ref 0–8)
ERYTHROCYTE [DISTWIDTH] IN BLOOD BY AUTOMATED COUNT: 13.7 % (ref 11.5–14.5)
EST. GFR  (NO RACE VARIABLE): >60 ML/MIN/1.73 M^2
GLUCOSE SERPL-MCNC: 205 MG/DL (ref 70–110)
HCT VFR BLD AUTO: 35.5 % (ref 40–54)
HGB BLD-MCNC: 11.5 G/DL (ref 14–18)
IMM GRANULOCYTES # BLD AUTO: 0.02 K/UL (ref 0–0.04)
IMM GRANULOCYTES NFR BLD AUTO: 0.2 % (ref 0–0.5)
LYMPHOCYTES # BLD AUTO: 1.7 K/UL (ref 1–4.8)
LYMPHOCYTES NFR BLD: 19.8 % (ref 18–48)
MCH RBC QN AUTO: 25.8 PG (ref 27–31)
MCHC RBC AUTO-ENTMCNC: 32.4 G/DL (ref 32–36)
MCV RBC AUTO: 80 FL (ref 82–98)
MONOCYTES # BLD AUTO: 1.2 K/UL (ref 0.3–1)
MONOCYTES NFR BLD: 14.2 % (ref 4–15)
NEUTROPHILS # BLD AUTO: 5.6 K/UL (ref 1.8–7.7)
NEUTROPHILS NFR BLD: 65.4 % (ref 38–73)
NRBC BLD-RTO: 0 /100 WBC
PLATELET # BLD AUTO: 235 K/UL (ref 150–450)
PMV BLD AUTO: 10.4 FL (ref 9.2–12.9)
POTASSIUM SERPL-SCNC: 3.6 MMOL/L (ref 3.5–5.1)
PROT SERPL-MCNC: 5.6 G/DL (ref 6–8.4)
RBC # BLD AUTO: 4.46 M/UL (ref 4.6–6.2)
SODIUM SERPL-SCNC: 139 MMOL/L (ref 136–145)
WBC # BLD AUTO: 8.64 K/UL (ref 3.9–12.7)

## 2024-02-15 PROCEDURE — 97166 OT EVAL MOD COMPLEX 45 MIN: CPT | Mod: HCNC

## 2024-02-15 PROCEDURE — 36415 COLL VENOUS BLD VENIPUNCTURE: CPT | Mod: HCNC

## 2024-02-15 PROCEDURE — 94761 N-INVAS EAR/PLS OXIMETRY MLT: CPT | Mod: HCNC

## 2024-02-15 PROCEDURE — 97530 THERAPEUTIC ACTIVITIES: CPT | Mod: HCNC

## 2024-02-15 PROCEDURE — 25000003 PHARM REV CODE 250: Mod: HCNC | Performed by: INTERNAL MEDICINE

## 2024-02-15 PROCEDURE — 97162 PT EVAL MOD COMPLEX 30 MIN: CPT | Mod: HCNC

## 2024-02-15 PROCEDURE — 85025 COMPLETE CBC W/AUTO DIFF WBC: CPT | Mod: HCNC | Performed by: HOSPITALIST

## 2024-02-15 PROCEDURE — 93005 ELECTROCARDIOGRAM TRACING: CPT | Mod: HCNC

## 2024-02-15 PROCEDURE — 80053 COMPREHEN METABOLIC PANEL: CPT | Mod: HCNC

## 2024-02-15 PROCEDURE — 93010 ELECTROCARDIOGRAM REPORT: CPT | Mod: HCNC,,, | Performed by: INTERNAL MEDICINE

## 2024-02-15 PROCEDURE — 99232 SBSQ HOSP IP/OBS MODERATE 35: CPT | Mod: HCNC,25,, | Performed by: INTERNAL MEDICINE

## 2024-02-15 RX ORDER — PRAVASTATIN SODIUM 80 MG/1
80 TABLET ORAL DAILY
Qty: 90 TABLET | Refills: 3 | Status: SHIPPED | OUTPATIENT
Start: 2024-02-15 | End: 2025-02-14

## 2024-02-15 RX ORDER — NITROGLYCERIN 0.4 MG/1
0.4 TABLET SUBLINGUAL EVERY 5 MIN PRN
Qty: 25 TABLET | Refills: 2 | Status: SHIPPED | OUTPATIENT
Start: 2024-02-15 | End: 2025-02-14

## 2024-02-15 RX ORDER — NAPROXEN SODIUM 220 MG/1
81 TABLET, FILM COATED ORAL DAILY
Qty: 30 TABLET | Refills: 11 | Status: SHIPPED | OUTPATIENT
Start: 2024-02-15 | End: 2025-02-14

## 2024-02-15 RX ORDER — METOPROLOL TARTRATE 25 MG/1
25 TABLET, FILM COATED ORAL 2 TIMES DAILY
Qty: 60 TABLET | Refills: 11 | Status: SHIPPED | OUTPATIENT
Start: 2024-02-15 | End: 2025-02-14

## 2024-02-15 RX ORDER — ONDANSETRON 4 MG/1
4 TABLET, ORALLY DISINTEGRATING ORAL EVERY 6 HOURS PRN
Qty: 20 TABLET | Refills: 0 | Status: SHIPPED | OUTPATIENT
Start: 2024-02-15

## 2024-02-15 RX ADMIN — PRAVASTATIN SODIUM 80 MG: 20 TABLET ORAL at 09:02

## 2024-02-15 RX ADMIN — TICAGRELOR 90 MG: 90 TABLET ORAL at 09:02

## 2024-02-15 RX ADMIN — ASPIRIN 81 MG CHEWABLE TABLET 81 MG: 81 TABLET CHEWABLE at 09:02

## 2024-02-15 RX ADMIN — METOPROLOL TARTRATE 25 MG: 25 TABLET, FILM COATED ORAL at 09:02

## 2024-02-15 RX ADMIN — MUPIROCIN: 20 OINTMENT TOPICAL at 09:02

## 2024-02-15 NOTE — TELEPHONE ENCOUNTER
Attempted X 3 to reach someone to schedule hosp f/u. Scheduled patient in first available appointment. Unable to leave voicemail to confirm date and time with patient.

## 2024-02-15 NOTE — PLAN OF CARE
OT eval completed. Patient at functional baseline. Continued skilled OT intervention in acute or post acute not warranted. D/C OT.

## 2024-02-15 NOTE — PLAN OF CARE
Pt AAOx 4. GCS 15. IABP removed 0900; see nursing note. VSS. Afebrile. SR on monitor. Room air. Reynolds removed 1730; Reynolds in place; 840mL UOP upon removal; urinal at bedside. BM x1 this shift. Accuchecks Q6. Pt with episode of N/V; PRN Zofran given; effective. Family updated at the bedside.     Pt resting comfortably in bed with side rails up x3; locked and lowered with alarm set. Call light in place. Advised pt to call out if anything is needed. Pt verbalized understanding.

## 2024-02-15 NOTE — PT/OT/SLP EVAL
"Occupational Therapy   Evaluation and Discharge Note    Name: Tramaine Resendiz  MRN: [id number]  Admitting Diagnosis: NSTEMI (non-ST elevated myocardial infarction)  Recent Surgery: Procedure(s) (LRB):  Left heart cath (Left)  Thrombectomy, Coronary  Percutaneous coronary intervention (N/A)  Stent, Drug Eluting, Multi Vessel, Coronary  PCI, FOR CHRONIC TOTAL CORONARY ARTERY OCCLUSION  IVUS, Coronary  Placement, IABP  Placement of Closure Device 2 Days Post-Op    Recommendations:     Discharge Recommendations: No Therapy Indicated  Discharge Equipment Recommendations: none  Barriers to discharge:  None    Assessment:     Tramaine Resendiz is a 68 y.o. male with a medical diagnosis of NSTEMI (non-ST elevated myocardial infarction). At this time, patient is functioning at their prior level of function and does not require further acute OT services.     Plan:     During this hospitalization, patient does not require further acute OT services.  Please re-consult if situation changes.    Plan of Care Reviewed with: patient    Subjective     Chief Complaint: Reported "I hope to go home today."  Patient/Family Comments/goals: none reported    Occupational Profile:  Living Environment: Patient lives with his wife in a [facility] with no steps to enter.  Previous level of function: Patient was independent with ADLs and community distance ambulation prior to admission.  Roles and Routines: Retired and active   Equipment Used at home: grab bar  Assistance upon Discharge: family    Pain/Comfort:  Pain Rating 1: 0/10    Objective:     Communicated with: NurseAnkur, prior to session.  Patient found supine with blood pressure cuff, telemetry, pulse ox (continuous), peripheral IV upon OT entry to room.    General Precautions: Standard, fall  Orthopedic Precautions: N/A  Braces: N/A  Respiratory Status: Room air     Occupational Performance:    Bed Mobility:    Patient completed Supine to Sit with independence    Functional " Mobility/Transfers:  Patient completed Sit <> Stand Transfer with independence  with  no assistive device   Patient completed Bed <> Chair Transfer using Step Transfer technique with independence with no assistive device  Functional Mobility: Patient completed x260ft functional mobility. Initially requiring SBA for generalized assessment of mobility. Progressing to independent baseline.    Activities of Daily Living:  Upper Body Dressing: independence .  Lower Body Dressing: independence .    Cognitive/Visual Perceptual:  Cognitive/Psychosocial Skills:     -       Oriented to: Person, Place, Time, and Situation   -       Follows Commands/attention:Follows two-step commands    Physical Exam:  Balance:    -       sitting: WFL, standing: WFL  Upper Extremity Range of Motion:     -       Right Upper Extremity: WFL  -       Left Upper Extremity: WFL  Upper Extremity Strength:    -       Right Upper Extremity: WFL  -       Left Upper Extremity: WFL   Strength:    -       Right Upper Extremity: WFL  -       Left Upper Extremity: WFL    AMPAC 6 Click ADL:  AMPAC Total Score: 24    Treatment & Education:  Patient educated on role of OT in acute setting and benefits of OOB activity. Encouraged OOB throughout the course of hospitalization to decrease risk of hospital related debility. Encouraged completion of B UE AROM therex throughout the day to maintain functional strength and activity tolerance needed for ADL completion. Patient stated understanding and in agreement with POC.    Patient left up in chair with all lines intact, call button in reach, chair alarm on, and nurse notified    History:     Past Medical History:   Diagnosis Date    Hypertension     Type 2 diabetes mellitus without complications 2012    BS didn't check 11/28/2022         Past Surgical History:   Procedure Laterality Date    HERNIA REPAIR         Time Tracking:     OT Date of Treatment: 02/15/24  OT Start Time: 0755  OT Stop Time: 0820  OT Total  Time (min): 25 min    Billable Minutes:Evaluation 15  Therapeutic Activity 10    2/15/2024

## 2024-02-15 NOTE — DISCHARGE SUMMARY
[facility] ([facility])  [facility]  Discharge Summary      Patient Name: Tramaine Resendiz  MRN: [id number]  [facility]: [id number]  Patient Class: IP- Inpatient  Admission Date: 2/12/2024  Hospital Length of Stay: 2 days  Discharge Date and Time: 2/15/2024 10:40 AM  Attending Physician: Jn Yu MD   Discharging Provider: Jn Yu MD  Primary Care Provider: Jeane Doyle MD    Primary Care Team: [facility]ICINE D    HPI:   Tramaine Resendiz is a 68 y.o. male with a PMH  has a past medical history of Hypertension and Type 2 diabetes mellitus without complications (2012). who presented to the ED for further evaluation of acute onset chest pain prior to arrival.  Patient stated he was at local urgent care for evaluation of hip pain when he suddenly developed hot flashes followed by substernal chest pain radiating to his left shoulder, shortness of breath, lightheadedness/dizziness, and nausea.  Pain was described as pressure/sharp/stabbing in nature, constant, lasting approximately 10-20 minutes in duration, subsided without intervention, rated 7/10 in severity at its worst, currently chest pain-free, and reported no known alleviating or aggravating factors noted.  He reported no other associated symptoms and denied experiencing similar symptoms previously.  He denied significant cardiac history including negative MI, CHF, or arrhythmias, and reported being in his usual state of health prior to onset of symptoms.  All other review of systems negative except as noted above.  Initial workup in the ED revealed patient to be hypotensive, hyperglycemic, and troponin elevated at 1.063 with repeat 1.370.  Patient initiated on heparin drip and admitted to [facility] under observation for continued medical management and treatment of NSTEMI.    PCP: Jeane Doyle      Procedure(s) (LRB):  Left heart cath (Left)  Thrombectomy, Coronary  Percutaneous coronary intervention (N/A)  Stent, Drug  Patient provided verbal consent for friends or family to be in room while medical information is discussed. Eluting, Multi Vessel, Coronary  PCI, FOR CHRONIC TOTAL CORONARY ARTERY OCCLUSION  IVUS, Coronary  Placement, IABP  Placement of Closure Device      Hospital Course:   2/13  Admitted for NSTEMI  Troponin trend 1.063 --> 1.370 --> 4.367 --> 5.284  Continue heparin drip, ASA, plavix, statin  TTE report as below  Telemetry monitoring    Echo    Result Date: 2/13/2024    Left Ventricle: The left ventricle is normal in size. Normal wall   thickness. There is concentric remodeling. Normal wall motion. There is   normal systolic function with a visually estimated ejection fraction of 60   - 65%. There is normal diastolic function.    Right Ventricle: Normal right ventricular cavity size. Wall thickness   is normal. Right ventricle wall motion  is normal. Systolic function is   normal.    Pulmonary Artery: The estimated pulmonary artery systolic pressure is   15 mmHg.    IVC/SVC: Normal venous pressure at 3 mmHg.       Patient reports chest pain and SOB improving  Cardiology following, plans for LH today    2/14  LHC yesterday - stents placed to LAD and angioplasty x 2 to the RCA  Patient monitored in ICU s/p LHC - arrived with IABP, dopamine, tirofiban    2/15  NAEON, denies chest pain, SOB  Stable for discharge home  Cardiology reccs reviewed  ASA, BB, statin, ticagrelor  F/U with Cardiology in 2-4 weeks     Goals of Care Treatment Preferences:  Code Status: Full Code      Consults:   Consults (From admission, onward)          Status Ordering Provider     Inpatient consult to Hospitalist  Once        Provider:  (Not yet assigned)    Acknowledged KETAN FARRAR     Inpatient consult to Diabetes educator  Once        Provider:  (Not yet assigned)    Completed HALIMA MORRIS     Inpatient consult to Registered Dietitian/Nutritionist  Once        Provider:  (Not yet assigned)    Completed KAYY BLACK     Inpatient consult to Social Work/Case Management  Once        Provider:  (Not yet assigned)    Completed KAYY BLACK      Inpatient consult to Cardiology  Once        Provider:  Jesus Varma MD    Completed RASHAD FALL            No new Assessment & Plan notes have been filed under this hospital service since the last note was generated.  Service: Hospital Medicine    Final Active Diagnoses:    Diagnosis Date Noted POA    PRINCIPAL PROBLEM:  NSTEMI (non-ST elevated myocardial infarction) [I21.4] 02/13/2024 Yes    Type 2 diabetes mellitus [E11.9] 08/19/2020 Yes    Hypertension [I10] 02/13/2024 Yes    Hyperlipidemia associated with type 2 diabetes mellitus [E11.69, E78.5] 01/25/2024 Yes      Problems Resolved During this Admission:       Discharged Condition: stable    Disposition:     Follow Up:   Follow-up Information       Jesus Varma MD. Schedule an appointment as soon as possible for a visit in 2 week(s).    Specialties: Interventional Cardiology, Cardiology  Why: Hospital discharge follow up 2-4 weeks  Contact information:  [id number]  [place] [address] [phone]               Jeane Doyle MD. Schedule an appointment as soon as possible for a visit in 1 month(s).    Specialty: Family Medicine  Contact information:  [address] [facility] [address] [phone]                           Patient Instructions:      REASON FOR NOT PRESCRIBING ANTIPLATELET MEDICATION AT DISCHARGE     Order Specific Question Answer Comments   Reason for not Prescribing: Other (Comment) Already done       Significant Diagnostic Studies: Labs: All labs within the past 24 hours have been reviewed    Pending Diagnostic Studies:       None           Medications:  Reconciled Home Medications:      Medication List        START taking these medications      aspirin 81 MG Chew  Take 1 tablet (81 mg total) by mouth once daily.     BRILINTA 90 mg tablet  Generic drug: ticagrelor  Take 1 tablet (90 mg total) by mouth 2 (two) times daily.     metoprolol tartrate 25 MG tablet  Commonly known as:  "LOPRESSOR  Take 1 tablet (25 mg total) by mouth 2 (two) times daily.     nitroGLYCERIN 0.4 MG SL tablet  Commonly known as: NITROSTAT  Place 1 tablet (0.4 mg total) under the tongue every 5 (five) minutes as needed for Chest pain (angina).     ondansetron 4 MG Tbdl  Commonly known as: ZOFRAN-ODT  Take 1 tablet (4 mg total) by mouth every 6 (six) hours as needed (Nausea).     pravastatin 80 MG tablet  Commonly known as: PRAVACHOL  Take 1 tablet (80 mg total) by mouth once daily.  Replaces: simvastatin 10 MG tablet            CONTINUE taking these medications      BD ULTRA-FINE SHORT PEN NEEDLE 31 gauge x 5/16" Ndle  Generic drug: pen needle, diabetic  to be used with lantus daily     blood sugar diagnostic Strp  To check BG 1-2 times daily, to use with insurance preferred meter     blood-glucose meter kit  To check BG 1-2 times daily, to use with insurance preferred meter     cyclobenzaprine 10 MG tablet  Commonly known as: FLEXERIL  Take 1 tablet (10 mg total) by mouth 3 (three) times daily.     lancets Misc  To check BG 1-2 times daily, to use with insurance preferred meter     LANTUS SOLOSTAR U-100 INSULIN glargine 100 units/mL SubQ pen  Generic drug: insulin  Inject 10 Units into the skin every evening.     meloxicam 7.5 MG tablet  Commonly known as: MOBIC  Take 1 tablet (7.5 mg total) by mouth once daily.            STOP taking these medications      simvastatin 10 MG tablet  Commonly known as: ZOCOR  Replaced by: pravastatin 80 MG tablet              Indwelling Lines/Drains at time of discharge:   Lines/Drains/Airways       None                   Time spent on the discharge of patient: 45 minutes      Jn Yu MD  Department of Hospital Medicine  [facility] ([facility])  "

## 2024-02-15 NOTE — PLAN OF CARE
P.T. EVAL COMPLETE.  PT CURRENTLY REQUIRES SBA FOR BED MOBILITY, TF'S, AND GAIT NO AD.  P.T. RECOMMENDS LOW INTENSITY THERAPY UPON DISCHARGE

## 2024-02-15 NOTE — SUBJECTIVE & OBJECTIVE
Interval History:     Review of Systems   Constitutional: Negative. Negative for weight gain.   HENT: Negative.     Eyes: Negative.    Cardiovascular: Negative.  Negative for chest pain, leg swelling and palpitations.   Respiratory: Negative.  Negative for shortness of breath.    Endocrine: Negative.    Hematologic/Lymphatic: Negative.    Skin: Negative.    Musculoskeletal:  Negative for muscle weakness.   Gastrointestinal: Negative.    Genitourinary: Negative.    Neurological: Negative.  Negative for dizziness.   Psychiatric/Behavioral: Negative.     Allergic/Immunologic: Negative.    All other systems reviewed and are negative.    Objective:     Vital Signs (Most Recent):  Temp: 99.3 °F (37.4 °C) (02/15/24 0305)  Pulse: 77 (02/15/24 0605)  Resp: 16 (02/15/24 0605)  BP: 131/65 (02/15/24 0605)  SpO2: 96 % (02/15/24 0605) Vital Signs (24h Range):  Temp:  [98 °F (36.7 °C)-99.7 °F (37.6 °C)] 99.3 °F (37.4 °C)  Pulse:  [74-87] 77  Resp:  [7-31] 16  SpO2:  [94 %-100 %] 96 %  BP: ()/(52-78) 131/65  Arterial Line BP: (119-155)/(58-67) 119/58     Weight: 88.8 kg (195 lb 12.3 oz)  Body mass index is 28.09 kg/m².     SpO2: 96 %         Intake/Output Summary (Last 24 hours) at 2/15/2024 0751  Last data filed at 2/14/2024 2124  Gross per 24 hour   Intake 311.47 ml   Output 840 ml   Net -528.53 ml       Lines/Drains/Airways       Peripheral Intravenous Line       Name Duration         Peripheral IV - Single Lumen 02/12/24 1734 20 G Right Forearm 2 days         Peripheral IV - Single Lumen 02/12/24 1958 20 G Anterior;Left Forearm 2 days                       Physical Exam  Vitals and nursing note reviewed.   Constitutional:       Appearance: He is well-developed.   HENT:      Head: Normocephalic and atraumatic.   Eyes:      Conjunctiva/sclera: Conjunctivae normal.      Pupils: Pupils are equal, round, and reactive to light.   Cardiovascular:      Rate and Rhythm: Normal rate and regular rhythm.      Pulses: Intact distal  pulses.      Heart sounds: Normal heart sounds.   Pulmonary:      Effort: Pulmonary effort is normal.      Breath sounds: Normal breath sounds.   Abdominal:      General: Bowel sounds are normal.      Palpations: Abdomen is soft.   Musculoskeletal:      Cervical back: Normal range of motion and neck supple.   Skin:     General: Skin is warm and dry.   Neurological:      Mental Status: He is alert and oriented to person, place, and time.            Significant Labs: All pertinent lab results from the last 24 hours have been reviewed.    Significant Imaging: X-Ray: CXR: X-Ray Chest 1 View (CXR):   Results for orders placed or performed during the hospital encounter of 02/12/24   X-Ray Chest 1 View    Narrative    EXAMINATION:  XR CHEST 1 VIEW    CLINICAL HISTORY:  IABP placement;    FINDINGS:  Comparison: February 12, 2024    Tip of the intra-aortic balloon pump overlies the aortic arch.    Mediastinal silhouette is within normal limits.  The lungs are clear.  No pneumothorax or pleural effusion.  No acute osseous finding.      Impression    Tip of the intra-aortic balloon pump overlies the aortic arch.      Electronically signed by: Ramirez Andino  Date:    02/13/2024  Time:    20:31

## 2024-02-15 NOTE — PROGRESS NOTES
[facility] - Intensive Care ([facility])  Cardiology  Progress Note    Patient Name: Tramaine Resendiz  MRN: [id number]  Admission Date: 2/12/2024  Hospital Length of Stay: 2 days  Code Status: Full Code   Attending Physician: Jn Yu MD   Primary Care Physician: Jeane Doyle MD  Expected Discharge Date:   Principal Problem:NSTEMI (non-ST elevated myocardial infarction)    Subjective:     Hospital Course:   2-14-24 s/p PCI of RCA and LAD, no c/o, no CP, has IABP, will pull out this am, continue statin, metoprolol, asa, brilenta    2-15-24  s/p PCI of RCA and LAD, no c/o, no CP, can go home today on  statin, metoprolol, asa, brilenta , follow up CV clinic 2-4 weeks    Interval History:     Review of Systems   Constitutional: Negative. Negative for weight gain.   HENT: Negative.     Eyes: Negative.    Cardiovascular: Negative.  Negative for chest pain, leg swelling and palpitations.   Respiratory: Negative.  Negative for shortness of breath.    Endocrine: Negative.    Hematologic/Lymphatic: Negative.    Skin: Negative.    Musculoskeletal:  Negative for muscle weakness.   Gastrointestinal: Negative.    Genitourinary: Negative.    Neurological: Negative.  Negative for dizziness.   Psychiatric/Behavioral: Negative.     Allergic/Immunologic: Negative.    All other systems reviewed and are negative.    Objective:     Vital Signs (Most Recent):  Temp: 99.3 °F (37.4 °C) (02/15/24 0305)  Pulse: 77 (02/15/24 0605)  Resp: 16 (02/15/24 0605)  BP: 131/65 (02/15/24 0605)  SpO2: 96 % (02/15/24 0605) Vital Signs (24h Range):  Temp:  [98 °F (36.7 °C)-99.7 °F (37.6 °C)] 99.3 °F (37.4 °C)  Pulse:  [74-87] 77  Resp:  [7-31] 16  SpO2:  [94 %-100 %] 96 %  BP: ()/(52-78) 131/65  Arterial Line BP: (119-155)/(58-67) 119/58     Weight: 88.8 kg (195 lb 12.3 oz)  Body mass index is 28.09 kg/m².     SpO2: 96 %         Intake/Output Summary (Last 24 hours) at 2/15/2024 0751  Last data filed at 2/14/2024 2124  Gross per 24 hour   Intake  311.47 ml   Output 840 ml   Net -528.53 ml       Lines/Drains/Airways       Peripheral Intravenous Line       Name Duration         Peripheral IV - Single Lumen 02/12/24 1734 20 G Right Forearm 2 days         Peripheral IV - Single Lumen 02/12/24 1958 20 G Anterior;Left Forearm 2 days                       Physical Exam  Vitals and nursing note reviewed.   Constitutional:       Appearance: He is well-developed.   HENT:      Head: Normocephalic and atraumatic.   Eyes:      Conjunctiva/sclera: Conjunctivae normal.      Pupils: Pupils are equal, round, and reactive to light.   Cardiovascular:      Rate and Rhythm: Normal rate and regular rhythm.      Pulses: Intact distal pulses.      Heart sounds: Normal heart sounds.   Pulmonary:      Effort: Pulmonary effort is normal.      Breath sounds: Normal breath sounds.   Abdominal:      General: Bowel sounds are normal.      Palpations: Abdomen is soft.   Musculoskeletal:      Cervical back: Normal range of motion and neck supple.   Skin:     General: Skin is warm and dry.   Neurological:      Mental Status: He is alert and oriented to person, place, and time.            Significant Labs: All pertinent lab results from the last 24 hours have been reviewed.    Significant Imaging: X-Ray: CXR: X-Ray Chest 1 View (CXR):   Results for orders placed or performed during the hospital encounter of 02/12/24   X-Ray Chest 1 View    Narrative    EXAMINATION:  XR CHEST 1 VIEW    CLINICAL HISTORY:  IABP placement;    FINDINGS:  Comparison: February 12, 2024    Tip of the intra-aortic balloon pump overlies the aortic arch.    Mediastinal silhouette is within normal limits.  The lungs are clear.  No pneumothorax or pleural effusion.  No acute osseous finding.      Impression    Tip of the intra-aortic balloon pump overlies the aortic arch.      Electronically signed by: Ramirez Andino  Date:    02/13/2024  Time:    20:31     Assessment and Plan:     Brief HPI:     * NSTEMI (non-ST elevated  myocardial infarction)  -Presents with typical CP symptoms/troponin elevation  -Consistent with NSTEMI  -Stable/CP free during exam  -Continue ASA, statin, heparin gtt  -No  BB given borderline BP  -Nitrates prn  -TTE pending  -[facility] today. All risks, benefits, and treatment alternatives explained to patient in detail. All questions answered. He has agreed to proceed.    Hypertension  -BP soft  -Will optimize regimen as tolerated    Hyperlipidemia associated with type 2 diabetes mellitus  -Statin    Type 2 diabetes mellitus  -Mgmt as per hospital medicine        VTE Risk Mitigation (From admission, onward)           Ordered     IP VTE LOW RISK PATIENT  Once         02/12/24 2018     Reason for no Mechanical VTE Prophylaxis  Once        Question:  Reasons:  Answer:  Physician Provided (leave comment)  Comment:  on heparin drip    02/12/24 2018     Place sequential compression device  Until discontinued         02/12/24 2018                    Jesus Varma MD  Cardiology  [facility] - Intensive Care ([facility]

## 2024-02-15 NOTE — PT/OT/SLP EVAL
"Physical Therapy Evaluation and Treatment    Patient Name:  Tramaine Resendiz   MRN:  [id number]    Recommendations:     Discharge Recommendations: Low Intensity Therapy   Discharge Equipment Recommendations: none   Barriers to discharge: None    Assessment:     Tramaine Resendiz is a 68 y.o. male admitted with a medical diagnosis of NSTEMI (non-ST elevated myocardial infarction).  He presents with the following impairments/functional limitations: impaired endurance, weakness, impaired functional mobility, gait instability, decreased coordination, decreased safety awareness.    Rehab Prognosis: Good; patient would benefit from acute skilled PT services to address these deficits and reach maximum level of function.    Recent Surgery: Procedure(s) (LRB):  Left heart cath (Left)  Thrombectomy, Coronary  Percutaneous coronary intervention (N/A)  Stent, Drug Eluting, Multi Vessel, Coronary  PCI, FOR CHRONIC TOTAL CORONARY ARTERY OCCLUSION  IVUS, Coronary  Placement, IABP  Placement of Closure Device 2 Days Post-Op    Plan:     During this hospitalization, patient to be seen 3 x/week to address the identified rehab impairments via gait training, therapeutic activities, therapeutic exercises and progress toward the following goals:    Plan of Care Expires:  02/29/24    Subjective     Chief Complaint: NONE, READY TO GO HOME, "I WALK BETTER IN MY BOOTS"  Patient/Family Comments/goals:   Pain/Comfort:  Pain Rating 1: 0/10    Patients cultural, spiritual, [employer]ism conflicts given the current situation:      Living Environment:  PT LIVES WITH WIFE 1 STORY HOUSE NO STEPS TO ENTER, PT AMB INDEP COMMUNITY DISTANCES, DRIVES, RETIRED, INDEP WITH ADL'S  Prior to admission, patients level of function was INDEP.  Equipment used at home: grab bar.  DME owned (not currently used): single point cane and wheelchair.  Upon discharge, patient will have assistance from WIFE.    Objective:     Communicated with NURSE MCDUFFIE prior to session.  " "Patient found supine with pulse ox (continuous), blood pressure cuff, peripheral IV, telemetry  upon PT entry to room.    General Precautions: Standard, fall  Orthopedic Precautions:N/A   Braces: N/A  Respiratory Status: Room air    Exams:  Cognitive Exam:  Patient is oriented to Person, Place, Time, and Situation  Postural Exam:  Patient presented with the following abnormalities:    -       Rounded shoulders  Sensation:    -       Intact  RLE ROM: WFL  RLE Strength: WFL  LLE ROM: WFL  LLE Strength: WFL    Functional Mobility:  Bed Mobility:     Rolling Left:  stand by assistance  Scooting: stand by assistance  Supine to Sit: stand by assistance  Transfers:     Sit to Stand:  stand by assistance with no AD  Bed to Chair: stand by assistance with  no AD  using  Step Transfer  Gait: PT ' NO AD WITH SBA, NO LOB OR SOB ON ROOM AIR  Balance: GOOD SITTING AND STANDING BALANCE  PT EDUCATED IN BLE THEREX TO PERFORM THROUGHOUT THE DAY WHILE SEATED IN CHAIR: HIP FLEX/EXT, LAQ, AP'S  BP IN SUPINE: 138/66, SITTIN/68, STANDIN/73    AM-PAC 6 CLICK MOBILITY  Total Score:21     Treatment & Education:  PT EDUCATION:  - ROLE OF P.T. AND POC IN ACUTE CARE HOSPITAL SETTING  - ENCOURAGED TO INCREASE TIME OOB IN CHAIR TO TOLERANCE   - TO CONTINUE THERAPUETIC EXERCISES THROUGHOUT THE DAY TO INCREASE ACTIVITY TOLERANCE AND DECREASE RISK FOR PNEUMONIA AND BLOOD CLOTS: HIP FLEX/EXT, HIP ABD/ADD, QUAD SET, HEEL SLIDE, AP  - RISK FOR FALLS DUE TO GENERALIZED WEAKNESS, EDUCATED ON "CALL DON'T FALL", ENCOURAGED TO CALL FOR ASSISTANCE WITH ALL NEEDS SUCH AS BED<>CHAIR TRANSFERS OR TRIPS TO BATHROOM, PT AGREEABLE TO SAFETY PRECAUTIONS    Patient left up in chair with all lines intact, call button in reach, chair alarm on, and NURSE notified.    GOALS:   Multidisciplinary Problems       Physical Therapy Goals          Problem: Physical Therapy    Goal Priority Disciplines Outcome Goal Variances Interventions   Physical Therapy " Goal     PT, PT/OT      Description: LTG'S TO BE MET IN 14 DAYS (2-29-24)  PT WILL BE WANDA FOR BED MOBILITY  PT WILL BE WANDA FOR BED<>CHAIR TF'S  PT WILL  FEET NO AD WANDA  PT WILL INC AMPAC SCORE BY 2 POINTS TO PROGRESS GROSS FUNC MOBILITY                         History:     Past Medical History:   Diagnosis Date    Hypertension     Type 2 diabetes mellitus without complications 2012    BS didn't check 11/28/2022       Past Surgical History:   Procedure Laterality Date    HERNIA REPAIR         Time Tracking:     PT Received On: 02/15/24  PT Start Time: 0800     PT Stop Time: 0825  PT Total Time (min): 25 min     Billable Minutes: Evaluation 15 and Therapeutic Activity 10    02/15/2024

## 2024-02-15 NOTE — PLAN OF CARE
Hemodynamically stable.  Able to urinate but did not collect urine to the urinal.Tolerated room air.

## 2024-02-16 ENCOUNTER — PATIENT OUTREACH (OUTPATIENT)
Dept: ADMINISTRATIVE | Facility: CLINIC | Age: 69
End: 2024-02-16
Payer: MEDICARE

## 2024-02-16 LAB
OHS QRS DURATION: 98 MS
OHS QTC CALCULATION: 444 MS

## 2024-02-16 NOTE — PROGRESS NOTES
C3 nurse spoke with Tramaine Resendiz and his spouse for a TCC post hospital discharge follow up call. Pt denies any new symptoms but states he had to use his PRN nitroglycerin once since his discharge home, after which his symptoms subsided. He requested information about when to remove his dressing and the pts spouse verbalized understanding to call his cardiologist for further instructions, and to call his PCP if she cannot get ahold of the cardiologist's office. She also verbalized understanding to call O[facility] for any new or worsening symptoms.     The patient does not have a scheduled HOSFU appointment with his PCP, Jeane Doyle MD within 5-7 days post hospital discharge date of 2/15/24. C3 nurse was unable to schedule HOSFU appointment in [facility] and the pt denied needing an NPHV.    Message sent to PCP staff requesting they contact patient and schedule follow up appointment.

## 2024-02-19 ENCOUNTER — TELEPHONE (OUTPATIENT)
Dept: CARDIOLOGY | Facility: CLINIC | Age: 69
End: 2024-02-19
Payer: MEDICARE

## 2024-02-19 NOTE — TELEPHONE ENCOUNTER
Attempted without success x2 to contact pt to discuss wound care instructions. Unable to leave voicemail due to mailbox being full.     ----- Message from Jesus Varma MD sent at 2/19/2024  3:07 PM CST -----  Regarding: FW: wound care instructions  Please give post cath instructions    ----- Message -----  From: Shannan Hensley RN  Sent: 2/19/2024   8:09 AM CST  To: Jesus Varma MD  Subject: RE: wound care instructions                      Good morning Dr. Lee,     I am not allowed to receive verbal or written orders with this position. We are more like a segue between the pt and provider to get them what they need. If you wouldn't mind please call the pt/his spouse and explain the wound care instructions.     Thank you for your time,     Shannan Hensley RN  Transitional Care Coordinator   ----- Message -----  From: Jesus Varma MD  Sent: 2/17/2024   5:02 AM CST  To: Shannan Hensley RN  Subject: RE: wound care instructions                      Can remove it now  ----- Message -----  From: Shannan Hensley RN  Sent: 2/16/2024   3:22 PM CST  To: Jesus Varma MD  Subject: wound care instructions                          Shirley Varma,     I am a discharge nurse who just got off the phone with this pt and his spouse. They are requesting information about when they can remove the pts dressing. I instructed them to call your clinic for clarification.     Please call the pt and relay information about woundcare instructions.     Thank you for your time,     Shannan Hensley RN  Transitional Care Coordinator

## 2024-02-26 ENCOUNTER — OFFICE VISIT (OUTPATIENT)
Dept: CARDIOLOGY | Facility: CLINIC | Age: 69
End: 2024-02-26
Payer: MEDICARE

## 2024-02-26 VITALS
OXYGEN SATURATION: 100 % | RESPIRATION RATE: 16 BRPM | SYSTOLIC BLOOD PRESSURE: 138 MMHG | HEART RATE: 64 BPM | DIASTOLIC BLOOD PRESSURE: 80 MMHG | HEIGHT: 70 IN | BODY MASS INDEX: 26.16 KG/M2 | WEIGHT: 182.75 LBS

## 2024-02-26 DIAGNOSIS — I10 PRIMARY HYPERTENSION: ICD-10-CM

## 2024-02-26 DIAGNOSIS — I21.4 NSTEMI (NON-ST ELEVATED MYOCARDIAL INFARCTION): ICD-10-CM

## 2024-02-26 DIAGNOSIS — E78.5 HYPERLIPIDEMIA ASSOCIATED WITH TYPE 2 DIABETES MELLITUS: Primary | ICD-10-CM

## 2024-02-26 DIAGNOSIS — E11.69 HYPERLIPIDEMIA ASSOCIATED WITH TYPE 2 DIABETES MELLITUS: Primary | ICD-10-CM

## 2024-02-26 LAB
POC ACTIVATED CLOTTING TIME K: 196 SEC (ref 74–137)
POC ACTIVATED CLOTTING TIME K: 217 SEC (ref 74–137)
POC ACTIVATED CLOTTING TIME K: 228 SEC (ref 74–137)
POC ACTIVATED CLOTTING TIME K: 260 SEC (ref 74–137)
POC ACTIVATED CLOTTING TIME K: 266 SEC (ref 74–137)
SAMPLE: ABNORMAL

## 2024-02-26 PROCEDURE — 3046F HEMOGLOBIN A1C LEVEL >9.0%: CPT | Mod: HCNC,CPTII,S$GLB, | Performed by: INTERNAL MEDICINE

## 2024-02-26 PROCEDURE — 1101F PT FALLS ASSESS-DOCD LE1/YR: CPT | Mod: HCNC,CPTII,S$GLB, | Performed by: INTERNAL MEDICINE

## 2024-02-26 PROCEDURE — 3288F FALL RISK ASSESSMENT DOCD: CPT | Mod: HCNC,CPTII,S$GLB, | Performed by: INTERNAL MEDICINE

## 2024-02-26 PROCEDURE — 99214 OFFICE O/P EST MOD 30 MIN: CPT | Mod: HCNC,S$GLB,, | Performed by: INTERNAL MEDICINE

## 2024-02-26 PROCEDURE — 3075F SYST BP GE 130 - 139MM HG: CPT | Mod: HCNC,CPTII,S$GLB, | Performed by: INTERNAL MEDICINE

## 2024-02-26 PROCEDURE — 99999 PR PBB SHADOW E&M-EST. PATIENT-LVL IV: CPT | Mod: PBBFAC,HCNC,, | Performed by: INTERNAL MEDICINE

## 2024-02-26 PROCEDURE — 3079F DIAST BP 80-89 MM HG: CPT | Mod: HCNC,CPTII,S$GLB, | Performed by: INTERNAL MEDICINE

## 2024-02-26 PROCEDURE — 3008F BODY MASS INDEX DOCD: CPT | Mod: HCNC,CPTII,S$GLB, | Performed by: INTERNAL MEDICINE

## 2024-02-26 PROCEDURE — 1160F RVW MEDS BY RX/DR IN RCRD: CPT | Mod: HCNC,CPTII,S$GLB, | Performed by: INTERNAL MEDICINE

## 2024-02-26 PROCEDURE — 1159F MED LIST DOCD IN RCRD: CPT | Mod: HCNC,CPTII,S$GLB, | Performed by: INTERNAL MEDICINE

## 2024-02-26 PROCEDURE — 1111F DSCHRG MED/CURRENT MED MERGE: CPT | Mod: HCNC,CPTII,S$GLB, | Performed by: INTERNAL MEDICINE

## 2024-02-26 NOTE — PROGRESS NOTES
Subjective:   Patient ID:  Tramaine Resendiz is a 68 y.o. male who presents for follow-up of No chief complaint on file.  Pt is s/p PCI LAD and RCA afrer NSTEMI  Patient denies CP, angina or anginal equivalent.  Hypertension  This is a chronic problem. The current episode started more than 1 year ago. The problem has been gradually improving since onset. The problem is controlled. Pertinent negatives include no chest pain, palpitations or shortness of breath. Past treatments include beta blockers. The current treatment provides moderate improvement. There are no compliance problems.    Hyperlipidemia  This is a chronic problem. The current episode started more than 1 year ago. The problem is controlled. Pertinent negatives include no chest pain or shortness of breath. Current antihyperlipidemic treatment includes statins. The current treatment provides moderate improvement of lipids. There are no compliance problems.    Coronary Artery Disease  Presents for follow-up visit. Pertinent negatives include no chest pain, chest pressure, chest tightness, dizziness, leg swelling, muscle weakness, palpitations, shortness of breath or weight gain. Risk factors include hyperlipidemia. The symptoms have been stable. Compliance with diet is good. Compliance with exercise is good. Compliance with medications is good.       Review of Systems   Constitutional: Negative. Negative for weight gain.   HENT: Negative.     Eyes: Negative.    Cardiovascular: Negative.  Negative for chest pain, leg swelling and palpitations.   Respiratory: Negative.  Negative for chest tightness and shortness of breath.    Endocrine: Negative.    Hematologic/Lymphatic: Negative.    Skin: Negative.    Musculoskeletal:  Negative for muscle weakness.   Gastrointestinal: Negative.    Genitourinary: Negative.    Neurological: Negative.  Negative for dizziness.   Psychiatric/Behavioral: Negative.     Allergic/Immunologic: Negative.    All other systems reviewed  and are negative.    Family History   Problem Relation Age of Onset    Hypertension Mother     Diabetes Mother      Past Medical History:   Diagnosis Date    Hypertension     Type 2 diabetes mellitus without complications 2012    BS didn't check 11/28/2022     Social History     Socioeconomic History    Marital status:    Tobacco Use    Smoking status: Former     Types: Cigarettes    Smokeless tobacco: Never   Substance and Sexual Activity    Alcohol use: Yes     Social Determinants of Health     Financial Resource Strain: Patient Declined (2/14/2024)    Overall Financial Resource Strain (CARDIA)     Difficulty of Paying Living Expenses: Patient declined   Food Insecurity: Patient Declined (2/14/2024)    Hunger Vital Sign     Worried About Running Out of Food in the Last Year: Patient declined     Ran Out of Food in the Last Year: Patient declined   Transportation Needs: Patient Declined (2/14/2024)    PRAPARE - Transportation     Lack of Transportation (Medical): Patient declined     Lack of Transportation (Non-Medical): Patient declined   Stress: Patient Declined (2/14/2024)    [employer] - Occupational Stress Questionnaire     Feeling of Stress : Patient declined   Housing Stability: Patient Declined (2/14/2024)    Housing Stability Vital Sign     Unable to Pay for Housing in the Last Year: Patient declined     Unstable Housing in the Last Year: Patient declined     Current Outpatient Medications on File Prior to Visit   Medication Sig Dispense Refill    aspirin 81 MG Chew Take 1 tablet (81 mg total) by mouth once daily. 30 tablet 11    blood sugar diagnostic Strp To check BG 1-2 times daily, to use with insurance preferred meter 100 strip 2    cyclobenzaprine (FLEXERIL) 10 MG tablet Take 1 tablet (10 mg total) by mouth 3 (three) times daily. 30 tablet 1    insulin (LANTUS SOLOSTAR U-100 INSULIN) glargine 100 units/mL SubQ pen Inject 10 Units into the skin every evening. 3 mL  "1    lancets Misc To check BG 1-2 times daily, to use with insurance preferred meter 100 each 2    meloxicam (MOBIC) 7.5 MG tablet Take 1 tablet (7.5 mg total) by mouth once daily. 30 tablet 1    metoprolol tartrate (LOPRESSOR) 25 MG tablet Take 1 tablet (25 mg total) by mouth 2 (two) times daily. 60 tablet 11    nitroGLYCERIN (NITROSTAT) 0.4 MG SL tablet Place 1 tablet (0.4 mg total) under the tongue every 5 (five) minutes as needed for Chest pain (angina). 25 tablet 2    ondansetron (ZOFRAN-ODT) 4 MG TbDL Take 1 tablet (4 mg total) by mouth every 6 (six) hours as needed (Nausea). 20 tablet 0    pen needle, diabetic 31 gauge x 5/16" Ndle to be used with lantus daily 100 each 11    pravastatin (PRAVACHOL) 80 MG tablet Take 1 tablet (80 mg total) by mouth once daily. 90 tablet 3    ticagrelor (BRILINTA) 90 mg tablet Take 1 tablet (90 mg total) by mouth 2 (two) times daily. 60 tablet 11    blood-glucose meter kit To check BG 1-2 times daily, to use with insurance preferred meter 1 each 0     No current facility-administered medications on file prior to visit.     Review of patient's allergies indicates:   Allergen Reactions    Tomato        Objective:     Physical Exam  Vitals and nursing note reviewed.   Constitutional:       Appearance: He is well-developed.   HENT:      Head: Normocephalic and atraumatic.   Eyes:      Conjunctiva/sclera: Conjunctivae normal.      Pupils: Pupils are equal, round, and reactive to light.   Cardiovascular:      Rate and Rhythm: Normal rate and regular rhythm.      Pulses: Intact distal pulses.      Heart sounds: Normal heart sounds.   Pulmonary:      Effort: Pulmonary effort is normal.      Breath sounds: Normal breath sounds.   Abdominal:      General: Bowel sounds are normal.      Palpations: Abdomen is soft.   Musculoskeletal:      Cervical back: Normal range of motion and neck supple.   Skin:     General: Skin is warm and dry.   Neurological:      Mental Status: He is alert and " oriented to person, place, and time.         Assessment:     1. Hyperlipidemia associated with type 2 diabetes mellitus    2. Primary hypertension    3. NSTEMI (non-ST elevated myocardial infarction)        Plan:     Hyperlipidemia associated with type 2 diabetes mellitus    Primary hypertension    NSTEMI (non-ST elevated myocardial infarction)        Continue asa, brilenta- CAD/PCI  Continue statin-HLP  Continue metoprolol- HTN  F/u Reyna

## 2024-02-29 ENCOUNTER — PROCEDURE VISIT (OUTPATIENT)
Dept: UROLOGY | Facility: CLINIC | Age: 69
End: 2024-02-29
Payer: MEDICARE

## 2024-02-29 DIAGNOSIS — R97.20 ELEVATED PSA: Primary | ICD-10-CM

## 2024-02-29 PROCEDURE — 55700 PR BIOPSY OF PROSTATE,NEEDLE/PUNCH: CPT | Mod: HCNC,S$GLB,, | Performed by: UROLOGY

## 2024-02-29 PROCEDURE — 88344 IMHCHEM/IMCYTCHM EA MLT ANTB: CPT | Mod: 26,HCNC,, | Performed by: PATHOLOGY

## 2024-02-29 PROCEDURE — 76872 US TRANSRECTAL: CPT | Mod: 26,HCNC,S$GLB, | Performed by: UROLOGY

## 2024-02-29 PROCEDURE — 88344 IMHCHEM/IMCYTCHM EA MLT ANTB: CPT | Mod: HCNC | Performed by: PATHOLOGY

## 2024-02-29 PROCEDURE — 88305 TISSUE EXAM BY PATHOLOGIST: CPT | Mod: HCNC | Performed by: PATHOLOGY

## 2024-02-29 PROCEDURE — G0416 PROSTATE BIOPSY, ANY MTHD: HCPCS | Mod: 26,HCNC,, | Performed by: PATHOLOGY

## 2024-02-29 NOTE — PROCEDURES
Procedures    CC: elevated PSA    HPI:  68-year-old gentleman with elevated PSA to 5.5 and no concerning lesions noted on MRI of the prostate who presents today for prostate biopsy.  He states that he has stopped all blood thinners.      Procedure: (1) Transrectal Prostate Biopsy                      (2) Transrectal ultrasound of prostate                     (3) Ultrasound Guidance of Prostate Biopsy needles    Detail: After proper consents were obtained, the patient was prepped and draped in normal fashion in the left lateral decubitus position for TRUS/Bx.  5 ml of lidocaine jelly was instilled in the rectum.  The U/S with rectal probe was used to size the prostate.  A spinal needle was used and 20ml of 1% lidocaine was instilled on the side of the prostate at the base of the seminal vesicles.  Biopsy was then performed using an 18Ga biopsy needle directed at the base, mid and apex bilaterally for a total of 12 cores. Antibiotic prophylaxis was provided using IM rocephin.    Findings: The prostate is 5.9W, 3.4H, and 4.3L for volume of 46 grams    Impression/Plan:  - will call with path  - blood per rectum, in urine, and in ejaculate are common  - instructed to present to ED for fevers >101F, inability to void, or other issues    Howard Baum MD

## 2024-03-05 LAB
FINAL PATHOLOGIC DIAGNOSIS: NORMAL
GROSS: NORMAL
Lab: NORMAL
MICROSCOPIC EXAM: NORMAL

## 2024-03-14 ENCOUNTER — OFFICE VISIT (OUTPATIENT)
Dept: UROLOGY | Facility: CLINIC | Age: 69
End: 2024-03-14
Payer: MEDICARE

## 2024-03-14 VITALS
WEIGHT: 183 LBS | HEART RATE: 88 BPM | BODY MASS INDEX: 26.26 KG/M2 | RESPIRATION RATE: 18 BRPM | DIASTOLIC BLOOD PRESSURE: 94 MMHG | SYSTOLIC BLOOD PRESSURE: 149 MMHG

## 2024-03-14 DIAGNOSIS — R97.20 ELEVATED PSA: ICD-10-CM

## 2024-03-14 DIAGNOSIS — N52.9 ERECTILE DYSFUNCTION, UNSPECIFIED ERECTILE DYSFUNCTION TYPE: Primary | ICD-10-CM

## 2024-03-14 PROCEDURE — 1125F AMNT PAIN NOTED PAIN PRSNT: CPT | Mod: HCNC,CPTII,S$GLB, | Performed by: UROLOGY

## 2024-03-14 PROCEDURE — 99214 OFFICE O/P EST MOD 30 MIN: CPT | Mod: HCNC,S$GLB,, | Performed by: UROLOGY

## 2024-03-14 PROCEDURE — 3046F HEMOGLOBIN A1C LEVEL >9.0%: CPT | Mod: HCNC,CPTII,S$GLB, | Performed by: UROLOGY

## 2024-03-14 PROCEDURE — 3080F DIAST BP >= 90 MM HG: CPT | Mod: HCNC,CPTII,S$GLB, | Performed by: UROLOGY

## 2024-03-14 PROCEDURE — 1159F MED LIST DOCD IN RCRD: CPT | Mod: HCNC,CPTII,S$GLB, | Performed by: UROLOGY

## 2024-03-14 PROCEDURE — 1101F PT FALLS ASSESS-DOCD LE1/YR: CPT | Mod: HCNC,CPTII,S$GLB, | Performed by: UROLOGY

## 2024-03-14 PROCEDURE — 3008F BODY MASS INDEX DOCD: CPT | Mod: HCNC,CPTII,S$GLB, | Performed by: UROLOGY

## 2024-03-14 PROCEDURE — 3077F SYST BP >= 140 MM HG: CPT | Mod: HCNC,CPTII,S$GLB, | Performed by: UROLOGY

## 2024-03-14 PROCEDURE — 1160F RVW MEDS BY RX/DR IN RCRD: CPT | Mod: HCNC,CPTII,S$GLB, | Performed by: UROLOGY

## 2024-03-14 PROCEDURE — 1111F DSCHRG MED/CURRENT MED MERGE: CPT | Mod: HCNC,CPTII,S$GLB, | Performed by: UROLOGY

## 2024-03-14 PROCEDURE — 3288F FALL RISK ASSESSMENT DOCD: CPT | Mod: HCNC,CPTII,S$GLB, | Performed by: UROLOGY

## 2024-03-14 PROCEDURE — 99999 PR PBB SHADOW E&M-EST. PATIENT-LVL IV: CPT | Mod: PBBFAC,HCNC,, | Performed by: UROLOGY

## 2024-03-14 RX ORDER — TADALAFIL 20 MG/1
20 TABLET ORAL DAILY PRN
Qty: 30 TABLET | Refills: 11 | Status: SHIPPED | OUTPATIENT
Start: 2024-03-14 | End: 2025-03-14

## 2024-03-14 NOTE — PROGRESS NOTES
Chief Complaint:   Elevated PSA    HPI:   03/14/2024 -  returns today for follow-up  and review his prostate biopsy results, this shows benign prostate tissue, no cancer or premalignant lesions found, wants to try something more for his ED, no gross hematuria or dysuria    09/28/2023 - patient returns today for follow-up, unfortunately he lost his mom the beginning of this month, still getting over it, no new voiding issues, Viagra not very helpful for his ED, wants to try something else, no gross hematuria or dysuria    03/16/2023 - patient returns today for follow-up and review of his MRI, this shows a 45 g prostate without suspicious lesions, patient also notes issues with ED, has never tried anything for his ED, does not take any nitrates, voiding well no gross hematuria, notes that his 93 yo mom has dementia and just recently broke her hip so he is busy taking care of her    Patient is a 67-year-old male that is presenting with an elevated PSA, 5.5.  Patient denies gross hematuria, no BPH meds.  Denies nocturia and stream is strong during the day.  Urine in clinic is negative and PVR was 45 mL.  No prior prostate biopsies.  No  cancers in his family.No urolithiasis.  No urinary bother.    Allergies:  Tomato    Medications:  has a current medication list which includes the following prescription(s): aspirin, blood sugar diagnostic, cyclobenzaprine, lantus solostar u-100 insulin, lancets, meloxicam, metoprolol tartrate, nitroglycerin, ondansetron, pen needle, diabetic, pravastatin, ticagrelor, blood-glucose meter, and tadalafil.    Review of Systems:  General: No fever, chills  Skin: No rashes  Chest:  Denies cough and sputum production  Heart: Denies chest pain  Resp: Denies dyspnea  Abdomen: Denies diarrhea, abdominal pain, hematemesis, or blood in stool.  Musculoskeletal: No joint stiffness or swelling. Denies back pain.  : see HPI  Neuro: no dizziness or weakness      PMH:   has a past medical history  of Hypertension and Type 2 diabetes mellitus without complications (2012).    PSH:   has a past surgical history that includes Hernia repair; Left heart catheterization (Left, 2/13/2024); thrombectomy, coronary (2/13/2024); percutaneous coronary intervention, artery (N/A, 2/13/2024); stent, drug eluting, multi vessel, coronary (2/13/2024); Percutaneous coronary intervention (PCI) for chronic total occlusion of coronary artery (2/13/2024); ivus, coronary (2/13/2024); placement, iabp (2/13/2024); and closure device (2/13/2024).    FamHx: family history includes Diabetes in his mother; Hypertension in his mother.    SocHx:  reports that he has quit smoking. His smoking use included cigarettes. He has never used smokeless tobacco. He reports current alcohol use. No history on file for drug use.      Physical Exam:  General: A&Ox3, no apparent distress, no deformities  Neck: No masses, normal thyroid  Lungs: normal inspiration, no use of accessory muscles  Heart: normal pulse, no arrhythmias  Abdomen: Soft, NT, ND, no masses, no hernias, no hepatosplenomegaly  Lymphatic: Neck and groin nodes negative  Skin: The skin is warm and dry. No jaundice.  Ext: No c/c/e.  : Test desc ap, no abnormalities of epididymus. Penis uncircumcised, with normal penile and scrotal skin. Meatus normal. Normal rectal tone, no hemorrhoids. Prost 40 gm no nodules or masses appreciated. SV not palpable. Perineum and anus normal.    MRI PROSTATE W W/O CONTRAST 01/17/2023     CLINICAL HISTORY:  prostate cancer;  Elevated prostate specific antigen (PSA)     TECHNIQUE:  Multiparametric MR imaging of the prostate gland is performed with and without the intravenous administration of 10 cc of Gadavist.     3D reconstructions: 3D reconstructions were ordered by the urologist to generate a 3D model of the prostate gland for subsequent target lesion mapping as needed for direct or fusion prostate biopsy.  I, the interpreting radiologist, performed the  3D reconstructions on iGo, an independent workstation, with reports and key images saved to PACS.     COMPARISON:  None     FINDINGS:  The prostate gland measures 5.8 x 3.9 x 4.0 cm for total volume of 45.72 cc.  The T1 sequence shows no intra prostatic hemorrhage.     The transitional zone contains hyperplastic nodules.  No suspicious transitional zone lesion.     The peripheral zone shows no diffusion restriction or suspicious T2 hypointense nodule.     Prostate capsule intact.  Rectoprostatic angle preserved and the neurovascular bundles have a normal appearance.  Normal seminal vesicles.  No pelvic lymphadenopathy.  No focal bone marrow replacing lesion in the bony pelvis.     Impression:     1. No focal suspicious lesion in the prostate gland to localize a clinically significant prostate cancer.  2. Total prostate volume 45.72 cc.  3. No pelvic lymphadenopathy.  PIRADS 2 - Low (clinically significant cancer unlikely to be present. )    Labs/Studies:    Latest Reference Range & Units 09/26/22 10:29   PSA, Screen 0.00 - 4.00 ng/mL 5.5 (H)   (H): Data is abnormally high    Impression/Plan:   Elevated PSA - negative MRI and biopsy, repeat PSA in 6 months    ED - RX for cialis sent,  instructed patient that he is not able to take nitroglycerin if he has chest pain after taking Cialis, that he  will need to present to the ED for evaluation if this occurs    Howard Baum MD

## 2024-03-21 ENCOUNTER — LAB VISIT (OUTPATIENT)
Dept: LAB | Facility: HOSPITAL | Age: 69
End: 2024-03-21
Payer: MEDICARE

## 2024-03-21 ENCOUNTER — OFFICE VISIT (OUTPATIENT)
Dept: INTERNAL MEDICINE | Facility: CLINIC | Age: 69
End: 2024-03-21
Payer: MEDICARE

## 2024-03-21 VITALS
SYSTOLIC BLOOD PRESSURE: 122 MMHG | OXYGEN SATURATION: 98 % | HEIGHT: 70 IN | TEMPERATURE: 98 F | WEIGHT: 183 LBS | HEART RATE: 79 BPM | BODY MASS INDEX: 26.2 KG/M2 | DIASTOLIC BLOOD PRESSURE: 62 MMHG

## 2024-03-21 DIAGNOSIS — I25.10 CORONARY ARTERY DISEASE INVOLVING NATIVE CORONARY ARTERY OF NATIVE HEART WITHOUT ANGINA PECTORIS: ICD-10-CM

## 2024-03-21 DIAGNOSIS — I10 PRIMARY HYPERTENSION: ICD-10-CM

## 2024-03-21 DIAGNOSIS — E78.5 HYPERLIPIDEMIA ASSOCIATED WITH TYPE 2 DIABETES MELLITUS: ICD-10-CM

## 2024-03-21 DIAGNOSIS — I21.4 NSTEMI (NON-ST ELEVATED MYOCARDIAL INFARCTION): ICD-10-CM

## 2024-03-21 DIAGNOSIS — Z09 HOSPITAL DISCHARGE FOLLOW-UP: Primary | ICD-10-CM

## 2024-03-21 DIAGNOSIS — E11.65 UNCONTROLLED TYPE 2 DIABETES MELLITUS WITH HYPERGLYCEMIA: ICD-10-CM

## 2024-03-21 DIAGNOSIS — E11.69 HYPERLIPIDEMIA ASSOCIATED WITH TYPE 2 DIABETES MELLITUS: ICD-10-CM

## 2024-03-21 DIAGNOSIS — E11.69 TYPE 2 DIABETES MELLITUS WITH OTHER SPECIFIED COMPLICATION, UNSPECIFIED WHETHER LONG TERM INSULIN USE: ICD-10-CM

## 2024-03-21 DIAGNOSIS — E11.65 TYPE 2 DIABETES MELLITUS WITH HYPERGLYCEMIA, WITHOUT LONG-TERM CURRENT USE OF INSULIN: ICD-10-CM

## 2024-03-21 LAB
ALBUMIN SERPL BCP-MCNC: 4 G/DL (ref 3.5–5.2)
ALBUMIN/CREAT UR: 198.7 UG/MG (ref 0–30)
ALP SERPL-CCNC: 89 U/L (ref 55–135)
ALT SERPL W/O P-5'-P-CCNC: 37 U/L (ref 10–44)
ANION GAP SERPL CALC-SCNC: 6 MMOL/L (ref 8–16)
AST SERPL-CCNC: 23 U/L (ref 10–40)
BILIRUB SERPL-MCNC: 0.3 MG/DL (ref 0.1–1)
BUN SERPL-MCNC: 12 MG/DL (ref 8–23)
CALCIUM SERPL-MCNC: 9.9 MG/DL (ref 8.7–10.5)
CHLORIDE SERPL-SCNC: 107 MMOL/L (ref 95–110)
CO2 SERPL-SCNC: 29 MMOL/L (ref 23–29)
CREAT SERPL-MCNC: 1 MG/DL (ref 0.5–1.4)
CREAT UR-MCNC: 156 MG/DL (ref 23–375)
EST. GFR  (NO RACE VARIABLE): >60 ML/MIN/1.73 M^2
ESTIMATED AVG GLUCOSE: 266 MG/DL (ref 68–131)
GLUCOSE SERPL-MCNC: 206 MG/DL (ref 70–110)
HBA1C MFR BLD: 10.9 % (ref 4–5.6)
MICROALBUMIN UR DL<=1MG/L-MCNC: 310 UG/ML
POTASSIUM SERPL-SCNC: 4.4 MMOL/L (ref 3.5–5.1)
PROT SERPL-MCNC: 7.4 G/DL (ref 6–8.4)
SODIUM SERPL-SCNC: 142 MMOL/L (ref 136–145)

## 2024-03-21 PROCEDURE — 83036 HEMOGLOBIN GLYCOSYLATED A1C: CPT | Mod: HCNC | Performed by: FAMILY MEDICINE

## 2024-03-21 PROCEDURE — 3074F SYST BP LT 130 MM HG: CPT | Mod: HCNC,CPTII,S$GLB,

## 2024-03-21 PROCEDURE — 99214 OFFICE O/P EST MOD 30 MIN: CPT | Mod: HCNC,S$GLB,,

## 2024-03-21 PROCEDURE — 3046F HEMOGLOBIN A1C LEVEL >9.0%: CPT | Mod: HCNC,CPTII,S$GLB,

## 2024-03-21 PROCEDURE — 36415 COLL VENOUS BLD VENIPUNCTURE: CPT | Mod: HCNC | Performed by: FAMILY MEDICINE

## 2024-03-21 PROCEDURE — 3288F FALL RISK ASSESSMENT DOCD: CPT | Mod: HCNC,CPTII,S$GLB,

## 2024-03-21 PROCEDURE — 99999 PR PBB SHADOW E&M-EST. PATIENT-LVL IV: CPT | Mod: PBBFAC,HCNC,,

## 2024-03-21 PROCEDURE — 82043 UR ALBUMIN QUANTITATIVE: CPT | Mod: HCNC | Performed by: FAMILY MEDICINE

## 2024-03-21 PROCEDURE — 3078F DIAST BP <80 MM HG: CPT | Mod: HCNC,CPTII,S$GLB,

## 2024-03-21 PROCEDURE — 80053 COMPREHEN METABOLIC PANEL: CPT | Mod: HCNC | Performed by: FAMILY MEDICINE

## 2024-03-21 PROCEDURE — 1125F AMNT PAIN NOTED PAIN PRSNT: CPT | Mod: HCNC,CPTII,S$GLB,

## 2024-03-21 PROCEDURE — 1101F PT FALLS ASSESS-DOCD LE1/YR: CPT | Mod: HCNC,CPTII,S$GLB,

## 2024-03-21 PROCEDURE — 1159F MED LIST DOCD IN RCRD: CPT | Mod: HCNC,CPTII,S$GLB,

## 2024-03-21 NOTE — PROGRESS NOTES
Tramaine Resendiz  03/21/2024  [id number]    Jeane Doyle MD  Patient Care Team:  Jeane Doyle MD as PCP - General (Family Medicine)  Sultana Ibarra NP as Nurse Practitioner (Internal Medicine)          Visit Type:a scheduled visit following a recent hospitalization    Chief Complaint:  Chief Complaint   Patient presents with    Follow-up        History of Present Illness:    Tramaine Resendiz is a 68 y.o. male with a PMH  has a past medical history of Hypertension and Type 2 diabetes mellitus without complications (2012). who presented to the ED for further evaluation of acute onset chest pain prior to arrival.  Patient stated he was at local urgent care for evaluation of hip pain when he suddenly developed hot flashes followed by substernal chest pain radiating to his left shoulder, shortness of breath, lightheadedness/dizziness, and nausea.  Pain was described as pressure/sharp/stabbing in nature, constant, lasting approximately 10-20 minutes in duration, subsided without intervention, rated 7/10 in severity at its worst, currently chest pain-free, and reported no known alleviating or aggravating factors noted.  He reported no other associated symptoms and denied experiencing similar symptoms previously.  He denied significant cardiac history including negative MI, CHF, or arrhythmias, and reported being in his usual state of health prior to onset of symptoms.  All other review of systems negative except as noted above.  Initial workup in the ED revealed patient to be hypotensive, hyperglycemic, and troponin elevated at 1.063 with repeat 1.370.  Patient initiated on heparin drip and admitted to Hospital Medicine under observation for continued medical management and treatment of NSTEMI.      Procedure(s) (LRB):  Left heart cath (Left)  Thrombectomy, Coronary  Percutaneous coronary intervention (N/A)  Stent, Drug Eluting, Multi Vessel, Coronary  PCI, FOR CHRONIC TOTAL CORONARY ARTERY OCCLUSION  IVUS,  Coronary  Placement, IABP  Placement of Closure Device       Hospital Course:   2/13  Admitted for NSTEMI  Troponin trend 1.063 --> 1.370 --> 4.367 --> 5.284  Continue heparin drip, ASA, plavix, statin  TTE report as below  Telemetry monitoring     Echo     Result Date: 2/13/2024    Left Ventricle: The left ventricle is normal in size. Normal wall   thickness. There is concentric remodeling. Normal wall motion. There is   normal systolic function with a visually estimated ejection fraction of 60   - 65%. There is normal diastolic function.    Right Ventricle: Normal right ventricular cavity size. Wall thickness   is normal. Right ventricle wall motion  is normal. Systolic function is   normal.    Pulmonary Artery: The estimated pulmonary artery systolic pressure is   15 mmHg.    IVC/SVC: Normal venous pressure at 3 mmHg.        Patient reports chest pain and SOB improving  Cardiology following, plans for LHC today     2/14  LHC yesterday - stents placed to LAD and angioplasty x 2 to the RCA  Patient monitored in ICU s/p LHC - arrived with IABP, dopamine, tirofiban     2/15  NAEON, denies chest pain, SOB  Stable for discharge home  Cardiology reccs reviewed  ASA, BB, statin, ticagrelor  F/U with Cardiology in 2-4 weeks     Had a hospital follow up appt with cards on 2/26    Transitional Care Note    Family and/or Caretaker present at visit?  No.  Diagnostic tests reviewed/disposition: I have reviewed all completed as well as pending diagnostic tests at the time of discharge.  Disease/illness education:   Home health/community services discussion/referrals: Patient does not have home health established from hospital visit.  They do not need home health.  If needed, we will set up home health for the patient.   Establishment or re-establishment of referral orders for community resources: No other necessary community resources.   Discussion with other health care providers: No discussion with other health care providers  necessary.      DM  Lab Results   Component Value Date    HGBA1C 11.7 (H) 02/12/2024    Taking 10 units of lantus       History:  Past Medical History:   Diagnosis Date    Hypertension     Type 2 diabetes mellitus without complications 2012    BS didn't check 11/28/2022     Past Surgical History:   Procedure Laterality Date    CLOSURE DEVICE  2/13/2024    Procedure: Placement of Closure Device;  Surgeon: Matthew Orellana MD;  Location: [facility] CATH LAB;  Service: Cardiology;;    HERNIA REPAIR      IVUS, CORONARY  2/13/2024    Procedure: IVUS, Coronary;  Surgeon: Matthew Orellana MD;  Location: [facility] CATH LAB;  Service: Cardiology;;    LEFT HEART CATHETERIZATION Left 2/13/2024    Procedure: Left heart cath;  Surgeon: Matthew Orellana MD;  Location: [facility] CATH LAB;  Service: Cardiology;  Laterality: Left;    PERCUTANEOUS CORONARY INTERVENTION (PCI) FOR CHRONIC TOTAL OCCLUSION OF CORONARY ARTERY  2/13/2024    Procedure: PCI, FOR CHRONIC TOTAL CORONARY ARTERY OCCLUSION;  Surgeon: Matthew Orellana MD;  Location: [facility] CATH LAB;  Service: Cardiology;;    PERCUTANEOUS CORONARY INTERVENTION, ARTERY N/A 2/13/2024    Procedure: Percutaneous coronary intervention;  Surgeon: Matthew Orellana MD;  Location: [facility] CATH LAB;  Service: Cardiology;  Laterality: N/A;    PLACEMENT, IABP  2/13/2024    Procedure: Placement, IABP;  Surgeon: Matthew Orellana MD;  Location: [facility] CATH LAB;  Service: Cardiology;;    STENT, DRUG ELUTING, MULTI VESSEL, CORONARY  2/13/2024    Procedure: Stent, Drug Eluting, Multi Vessel, Coronary;  Surgeon: Matthew Orellana MD;  Location: [facility] CATH LAB;  Service: Cardiology;;    THROMBECTOMY, CORONARY  2/13/2024    Procedure: Thrombectomy, Coronary;  Surgeon: Matthew Orellana MD;  Location: [facility] CATH LAB;  Service: Cardiology;;     Family History   Problem Relation Age of Onset    Hypertension Mother     Diabetes Mother      Social History     Socioeconomic History    Marital status:    Tobacco Use    Smoking  status: Former     Types: Cigarettes    Smokeless tobacco: Never   Substance and Sexual Activity    Alcohol use: Yes     Social Determinants of Health     Financial Resource Strain: Patient Declined (2/14/2024)    Overall Financial Resource Strain (CARDIA)     Difficulty of Paying Living Expenses: Patient declined   Food Insecurity: Patient Declined (2/14/2024)    Hunger Vital Sign     Worried About Running Out of Food in the Last Year: Patient declined     Ran Out of Food in the Last Year: Patient declined   Transportation Needs: Patient Declined (2/14/2024)    PRAPARE - Transportation     Lack of Transportation (Medical): Patient declined     Lack of Transportation (Non-Medical): Patient declined   Stress: Patient Declined (2/14/2024)    [employer] [place] [employer] - Occupational Stress Questionnaire     Feeling of Stress : Patient declined   Housing Stability: Patient Declined (2/14/2024)    Housing Stability Vital Sign     Unable to Pay for Housing in the Last Year: Patient declined     Unstable Housing in the Last Year: Patient declined     Patient Active Problem List   Diagnosis    Erectile dysfunction    Type 2 diabetes mellitus    Hyperlipidemia associated with type 2 diabetes mellitus    NSTEMI (non-ST elevated myocardial infarction)    Hypertension    Coronary artery disease involving native coronary artery of native heart without angina pectoris     Review of patient's allergies indicates:   Allergen Reactions    Tomato        The following were reviewed at this visit: active problem list, medication list, allergies, family history, social history, and health maintenance.    Medications:  Current Outpatient Medications on File Prior to Visit   Medication Sig Dispense Refill    aspirin 81 MG Chew Take 1 tablet (81 mg total) by mouth once daily. 30 tablet 11    blood sugar diagnostic Strp To check BG 1-2 times daily, to use with insurance preferred meter 100 strip 2    blood-glucose meter kit  "To check BG 1-2 times daily, to use with insurance preferred meter 1 each 0    cyclobenzaprine (FLEXERIL) 10 MG tablet Take 1 tablet (10 mg total) by mouth 3 (three) times daily. 30 tablet 1    insulin (LANTUS SOLOSTAR U-100 INSULIN) glargine 100 units/mL SubQ pen Inject 10 Units into the skin every evening. 3 mL 1    lancets Misc To check BG 1-2 times daily, to use with insurance preferred meter 100 each 2    meloxicam (MOBIC) 7.5 MG tablet Take 1 tablet (7.5 mg total) by mouth once daily. 30 tablet 1    metoprolol tartrate (LOPRESSOR) 25 MG tablet Take 1 tablet (25 mg total) by mouth 2 (two) times daily. 60 tablet 11    nitroGLYCERIN (NITROSTAT) 0.4 MG SL tablet Place 1 tablet (0.4 mg total) under the tongue every 5 (five) minutes as needed for Chest pain (angina). 25 tablet 2    ondansetron (ZOFRAN-ODT) 4 MG TbDL Take 1 tablet (4 mg total) by mouth every 6 (six) hours as needed (Nausea). 20 tablet 0    pen needle, diabetic 31 gauge x 5/16" Ndle to be used with lantus daily 100 each 11    pravastatin (PRAVACHOL) 80 MG tablet Take 1 tablet (80 mg total) by mouth once daily. 90 tablet 3    tadalafiL (CIALIS) 20 MG Tab Take 1 tablet (20 mg total) by mouth daily as needed (prior to sex). 30 tablet 11    ticagrelor (BRILINTA) 90 mg tablet Take 1 tablet (90 mg total) by mouth 2 (two) times daily. 60 tablet 11     No current facility-administered medications on file prior to visit.       Medications have been reviewed and reconciled with patient at this visit.  Barriers to medications reviewed with patient.    Adverse reactions to current medications reviewed with patient..    Over the counter medications reviewed and reconciled with patient.    Exam:  Wt Readings from Last 3 Encounters:   03/14/24 83 kg (182 lb 15.7 oz)   02/26/24 82.9 kg (182 lb 12.2 oz)   02/15/24 88.8 kg (195 lb 12.3 oz)     Temp Readings from Last 3 Encounters:   02/15/24 98 °F (36.7 °C) (Temporal)   02/12/24 96.4 °F (35.8 °C) (Tympanic)   10/04/23 " (!) 95.9 °F (35.5 °C)     BP Readings from Last 3 Encounters:   03/14/24 (!) 149/94   02/26/24 138/80   02/15/24 138/73     Pulse Readings from Last 3 Encounters:   03/14/24 88   02/26/24 64   02/15/24 85     There is no height or weight on file to calculate BMI.      Review of Systems   Respiratory:  Negative for sputum production and shortness of breath.    Cardiovascular:  Negative for chest pain and palpitations.     Physical Exam  Nursing note reviewed.   HENT:      Head: Normocephalic and atraumatic.   Cardiovascular:      Rate and Rhythm: Normal rate and regular rhythm.      Pulses: Normal pulses.      Heart sounds: Normal heart sounds.   Pulmonary:      Effort: Pulmonary effort is normal. No respiratory distress.      Breath sounds: Normal breath sounds.   Skin:     General: Skin is dry.   Neurological:      Mental Status: He is alert and oriented to person, place, and time.   Psychiatric:         Mood and Affect: Mood normal.         Behavior: Behavior normal.         Thought Content: Thought content normal.         Judgment: Judgment normal.         Laboratory Reviewed ({Yes)  Lab Results   Component Value Date    WBC 8.64 02/15/2024    HGB 11.5 (L) 02/15/2024    HCT 35.5 (L) 02/15/2024     02/15/2024    CHOL 154 02/12/2024    TRIG 59 02/12/2024    HDL 43 02/12/2024    ALT 20 02/15/2024    AST 53 (H) 02/15/2024     02/15/2024    K 3.6 02/15/2024     02/15/2024    CREATININE 1.0 02/15/2024    BUN 15 02/15/2024    CO2 23 02/15/2024    PSA 5.5 (H) 09/26/2022    INR 1.0 02/12/2024    HGBA1C 11.7 (H) 02/12/2024     Tramaine was seen today for follow-up.    Diagnoses and all orders for this visit:    Hospital discharge follow-up    Coronary artery disease involving native coronary artery of native heart without angina pectoris    NSTEMI (non-ST elevated myocardial infarction)    Primary hypertension  At visit, Blood pressure is at goal. Continue current medications      Type 2 diabetes mellitus  with other specified complication, unspecified whether long term insulin use    Uncontrolled type 2 diabetes mellitus with hyperglycemia      DM  Scheduled for labs today  Depending on A1C may need adjustment to medications    CAD  Continue asa, brilenta- CAD/PCI  Continue statin-HLP  Continue metoprolol- HTN    Pt states that he has been complaint with his medications  Discussed importance of not delaying care when having chest pain or other emergent care    Will wait before ordering colonoscopy     DM eye exam     Appt with Dr. Doyle in 2-3 months for DM       Care Plan/Goals: Reviewed    Goals    None         Follow up: No follow-ups on file.    After visit summary was printed and given to patient upon discharge today.  Patient goals and care plan are included in After Visit Summary.

## 2024-03-22 DIAGNOSIS — E11.65 TYPE 2 DIABETES MELLITUS WITH HYPERGLYCEMIA, WITHOUT LONG-TERM CURRENT USE OF INSULIN: ICD-10-CM

## 2024-03-22 RX ORDER — INSULIN GLARGINE 100 [IU]/ML
15 INJECTION, SOLUTION SUBCUTANEOUS NIGHTLY
Qty: 15 ML | Refills: 3 | Status: SHIPPED | OUTPATIENT
Start: 2024-03-22 | End: 2025-03-22

## 2024-03-22 RX ORDER — SEMAGLUTIDE 0.68 MG/ML
0.25 INJECTION, SOLUTION SUBCUTANEOUS
Qty: 3 ML | Refills: 3 | Status: SHIPPED | OUTPATIENT
Start: 2024-03-22

## 2024-03-22 RX ORDER — SEMAGLUTIDE 0.68 MG/ML
0.25 INJECTION, SOLUTION SUBCUTANEOUS
Qty: 3 ML | Refills: 3 | Status: SHIPPED | OUTPATIENT
Start: 2024-03-22 | End: 2024-03-22 | Stop reason: SDUPTHER

## 2024-04-05 ENCOUNTER — TELEPHONE (OUTPATIENT)
Dept: INTERNAL MEDICINE | Facility: CLINIC | Age: 69
End: 2024-04-05
Payer: MEDICARE

## 2024-04-05 NOTE — TELEPHONE ENCOUNTER
----- Message from Leeann Conley sent at 4/5/2024 10:50 AM CDT -----  Patient's wife is requesting a call back regarding OZEMPIC. Call back number is [phone]. [email or web address]

## 2024-04-30 DIAGNOSIS — E11.65 TYPE 2 DIABETES MELLITUS WITH HYPERGLYCEMIA, WITHOUT LONG-TERM CURRENT USE OF INSULIN: ICD-10-CM

## 2024-04-30 NOTE — TELEPHONE ENCOUNTER
No care due was identified.  [facility] Embedded Care Due Messages. Reference number: [id number].   4/30/2024 8:35:27 AM CDT

## 2024-04-30 NOTE — TELEPHONE ENCOUNTER
Called and spoke with patient. Patient says he is currently taking 15 units of Lantus.  Says Ozempic is too expensive and he is unable to afford it so he is not taking Ozempic.

## 2024-05-01 RX ORDER — INSULIN GLARGINE 100 [IU]/ML
15 INJECTION, SOLUTION SUBCUTANEOUS NIGHTLY
Qty: 3 ML | Refills: 1 | Status: SHIPPED | OUTPATIENT
Start: 2024-05-01 | End: 2025-05-01

## 2024-05-03 NOTE — Clinical Note
Health Maintenance       Hepatitis B Vaccine (1 of 3 - 19+ 3-dose series)  Never done    Shingles Vaccine (1 of 2)  Never done    Hepatitis C Screening (Once)  Never done    COVID-19 Vaccine (3 - 2023-24 season)  Overdue since 9/1/2023    Depression Screening (Yearly)  Due soon on 11/2/2024           Following review of the above:  Patient wishes to discuss with clinician: Hepatitis C Screening, COVID-19, Hep B, and Shingles    Note: Refer to final orders and clinician documentation.       ID band present and verified. Daughters in patient's room

## 2024-05-20 PROBLEM — I21.4 NSTEMI (NON-ST ELEVATED MYOCARDIAL INFARCTION): Status: RESOLVED | Noted: 2024-02-13 | Resolved: 2024-05-20

## 2024-06-07 ENCOUNTER — PATIENT OUTREACH (OUTPATIENT)
Dept: ADMINISTRATIVE | Facility: HOSPITAL | Age: 69
End: 2024-06-07
Payer: MEDICARE

## 2024-06-07 NOTE — PROGRESS NOTES
VBHM Score: 3     Colon Cancer Screening  Eye Exam  AAA Screening    Tetanus Vaccine  Shingles/Zoster Vaccine  RSV Vaccine        Additional Notes:  Attempted to contact the patient to discuss/schedule overdue HM screenings, no answer, no voicemail. Patient has upcoming appointment with PCP on 6/17/24.           Care Management, Digital Medicine, and/or Education Referrals    OPCM Risk Score: 36.6         Next Steps - Referral Actions: no referrals        Additional Notes:  SDOH reviewed 2/14/24, recently patient was contacted to discuss dig med program by the [facility] med dept, unable to contact the patient

## 2024-06-28 ENCOUNTER — OFFICE VISIT (OUTPATIENT)
Dept: INTERNAL MEDICINE | Facility: CLINIC | Age: 69
End: 2024-06-28
Payer: MEDICARE

## 2024-06-28 VITALS
WEIGHT: 185.19 LBS | SYSTOLIC BLOOD PRESSURE: 130 MMHG | HEIGHT: 70 IN | HEART RATE: 92 BPM | OXYGEN SATURATION: 97 % | BODY MASS INDEX: 26.51 KG/M2 | TEMPERATURE: 96 F | DIASTOLIC BLOOD PRESSURE: 60 MMHG

## 2024-06-28 DIAGNOSIS — I10 PRIMARY HYPERTENSION: ICD-10-CM

## 2024-06-28 DIAGNOSIS — M17.0 BILATERAL PRIMARY OSTEOARTHRITIS OF KNEE: ICD-10-CM

## 2024-06-28 DIAGNOSIS — I25.10 CORONARY ARTERY DISEASE INVOLVING NATIVE CORONARY ARTERY OF NATIVE HEART WITHOUT ANGINA PECTORIS: ICD-10-CM

## 2024-06-28 DIAGNOSIS — I21.4 NSTEMI (NON-ST ELEVATED MYOCARDIAL INFARCTION): ICD-10-CM

## 2024-06-28 DIAGNOSIS — M25.551 RIGHT HIP PAIN: ICD-10-CM

## 2024-06-28 DIAGNOSIS — E11.65 TYPE 2 DIABETES MELLITUS WITH HYPERGLYCEMIA, WITHOUT LONG-TERM CURRENT USE OF INSULIN: Primary | ICD-10-CM

## 2024-06-28 DIAGNOSIS — Z12.11 COLON CANCER SCREENING: ICD-10-CM

## 2024-06-28 PROCEDURE — 99999 PR PBB SHADOW E&M-EST. PATIENT-LVL V: CPT | Mod: PBBFAC,,,

## 2024-06-28 NOTE — PROGRESS NOTES
Tramaine Resendiz  06/28/2024  [id number]    Jeane Doyle MD  Patient Care Team:  Jeane Doyle MD as PCP - General (Family Medicine)  Sultana Ibarra NP as Nurse Practitioner (Internal Medicine)          Visit Type:a scheduled routine follow-up visit    Chief Complaint:  Chief Complaint   Patient presents with    Follow-up        History of Present Illness:      DM  Lab Results   Component Value Date    HGBA1C 10.9 (H) 03/21/2024   Lantus 15 units once a day   He has not been taking metformin  He did not start taking the Ozempic. Too expensive       At visit states that he has pain in his right hip and right knee  Pain with ambulating. Using cane  No injuries to call pain  States hip gets weak when ambulating   Previously referred to Ortho  He left without being seen     History of NSTEMI  Denies SOB, CP, leg swelling  Taking brilinta and statin as prescribed     History:  Past Medical History:   Diagnosis Date    Hypertension     Type 2 diabetes mellitus without complications 2012    BS didn't check 11/28/2022     Past Surgical History:   Procedure Laterality Date    CLOSURE DEVICE  2/13/2024    Procedure: Placement of Closure Device;  Surgeon: Matthew Orellana MD;  Location: [facility] CATH LAB;  Service: Cardiology;;    HERNIA REPAIR      IVUS, CORONARY  2/13/2024    Procedure: IVUS, Coronary;  Surgeon: Matthew Orellana MD;  Location: [facility] CATH LAB;  Service: Cardiology;;    LEFT HEART CATHETERIZATION Left 2/13/2024    Procedure: Left heart cath;  Surgeon: Matthew Orellana MD;  Location: [facility] CATH LAB;  Service: Cardiology;  Laterality: Left;    PERCUTANEOUS CORONARY INTERVENTION (PCI) FOR CHRONIC TOTAL OCCLUSION OF CORONARY ARTERY  2/13/2024    Procedure: PCI, FOR CHRONIC TOTAL CORONARY ARTERY OCCLUSION;  Surgeon: Matthew Orellana MD;  Location: [facility] CATH LAB;  Service: Cardiology;;    PERCUTANEOUS CORONARY INTERVENTION, ARTERY N/A 2/13/2024    Procedure: Percutaneous coronary intervention;  Surgeon: Reyna  Matthew OTTO MD;  Location: [facility] CATH LAB;  Service: Cardiology;  Laterality: N/A;    PLACEMENT, IABP  2/13/2024    Procedure: Placement, IABP;  Surgeon: Matthew Orellana MD;  Location: [facility] CATH LAB;  Service: Cardiology;;    STENT, DRUG ELUTING, MULTI VESSEL, CORONARY  2/13/2024    Procedure: Stent, Drug Eluting, Multi Vessel, Coronary;  Surgeon: Matthew Orellana MD;  Location: [facility] CATH LAB;  Service: Cardiology;;    THROMBECTOMY, CORONARY  2/13/2024    Procedure: Thrombectomy, Coronary;  Surgeon: Matthew Orellana MD;  Location: [facility] CATH LAB;  Service: Cardiology;;     Family History   Problem Relation Name Age of Onset    Hypertension Mother      Diabetes Mother       Social History     Socioeconomic History    Marital status:    Tobacco Use    Smoking status: Former     Types: Cigarettes    Smokeless tobacco: Never   Substance and Sexual Activity    Alcohol use: Yes     Social Determinants of Health     Financial Resource Strain: Patient Declined (2/14/2024)    Overall Financial Resource Strain (CARDIA)     Difficulty of Paying Living Expenses: Patient declined   Food Insecurity: Patient Declined (2/14/2024)    Hunger Vital Sign     Worried About Running Out of Food in the Last Year: Patient declined     Ran Out of Food in the Last Year: Patient declined   Transportation Needs: Patient Declined (2/14/2024)    PRAPARE - Transportation     Lack of Transportation (Medical): Patient declined     Lack of Transportation (Non-Medical): Patient declined   Stress: Patient Declined (2/14/2024)    [employer] [place] [employer] - Occupational Stress Questionnaire     Feeling of Stress : Patient declined   Housing Stability: Patient Declined (2/14/2024)    Housing Stability Vital Sign     Unable to Pay for Housing in the Last Year: Patient declined     Unstable Housing in the Last Year: Patient declined     Patient Active Problem List   Diagnosis    Erectile dysfunction    Type 2 diabetes mellitus     "Hyperlipidemia associated with type 2 diabetes mellitus    Hypertension    Coronary artery disease involving native coronary artery of native heart without angina pectoris     Review of patient's allergies indicates:   Allergen Reactions    Tomato        The following were reviewed at this visit: active problem list, medication list, allergies, family history, social history, and health maintenance.    Medications:  Current Outpatient Medications on File Prior to Visit   Medication Sig Dispense Refill    aspirin 81 MG Chew Take 1 tablet (81 mg total) by mouth once daily. 30 tablet 11    blood sugar diagnostic Strp To check BG 1-2 times daily, to use with insurance preferred meter 100 strip 2    blood-glucose meter kit To check BG 1-2 times daily, to use with insurance preferred meter 1 each 0    cyclobenzaprine (FLEXERIL) 10 MG tablet Take 1 tablet (10 mg total) by mouth 3 (three) times daily. 30 tablet 1    insulin (LANTUS SOLOSTAR U-100 INSULIN) glargine 100 units/mL SubQ pen Inject 15 Units into the skin every evening. 15 mL 3    insulin (LANTUS SOLOSTAR U-100 INSULIN) glargine 100 units/mL SubQ pen Inject 15 Units into the skin every evening. 3 mL 1    lancets Misc To check BG 1-2 times daily, to use with insurance preferred meter 100 each 2    meloxicam (MOBIC) 7.5 MG tablet Take 1 tablet (7.5 mg total) by mouth once daily. 30 tablet 1    metoprolol tartrate (LOPRESSOR) 25 MG tablet Take 1 tablet (25 mg total) by mouth 2 (two) times daily. 60 tablet 11    nitroGLYCERIN (NITROSTAT) 0.4 MG SL tablet Place 1 tablet (0.4 mg total) under the tongue every 5 (five) minutes as needed for Chest pain (angina). 25 tablet 2    ondansetron (ZOFRAN-ODT) 4 MG TbDL Take 1 tablet (4 mg total) by mouth every 6 (six) hours as needed (Nausea). 20 tablet 0    pen needle, diabetic 31 gauge x 5/16" Ndle to be used with lantus daily 100 each 11    pravastatin (PRAVACHOL) 80 MG tablet Take 1 tablet (80 mg total) by mouth once daily. 90 " tablet 3    semaglutide (OZEMPIC) 0.25 mg or 0.5 mg (2 mg/3 mL) pen injector Inject 0.25 mg into the skin every 7 days. 3 mL 3    tadalafiL (CIALIS) 20 MG Tab Take 1 tablet (20 mg total) by mouth daily as needed (prior to sex). 30 tablet 11    ticagrelor (BRILINTA) 90 mg tablet Take 1 tablet (90 mg total) by mouth 2 (two) times daily. 60 tablet 11     No current facility-administered medications on file prior to visit.       Medications have been reviewed and reconciled with patient at this visit.  Barriers to medications reviewed with patient.    Adverse reactions to current medications reviewed with patient..    Over the counter medications reviewed and reconciled with patient.    Exam:  Wt Readings from Last 3 Encounters:   03/21/24 83 kg (182 lb 15.7 oz)   03/14/24 83 kg (182 lb 15.7 oz)   02/26/24 82.9 kg (182 lb 12.2 oz)     Temp Readings from Last 3 Encounters:   03/21/24 98.1 °F (36.7 °C) (Tympanic)   02/15/24 98 °F (36.7 °C) (Temporal)   02/12/24 96.4 °F (35.8 °C) (Tympanic)     BP Readings from Last 3 Encounters:   03/21/24 122/62   03/14/24 (!) 149/94   02/26/24 138/80     Pulse Readings from Last 3 Encounters:   03/21/24 79   03/14/24 88   02/26/24 64     There is no height or weight on file to calculate BMI.      Review of Systems   Musculoskeletal:  Positive for joint pain. Negative for falls.   Neurological:  Positive for weakness.     Physical Exam  Nursing note reviewed.   HENT:      Head: Normocephalic and atraumatic.   Cardiovascular:      Rate and Rhythm: Normal rate and regular rhythm.      Heart sounds: Normal heart sounds.   Pulmonary:      Effort: Pulmonary effort is normal. No respiratory distress.      Breath sounds: Normal breath sounds.   Musculoskeletal:         General: Tenderness present. No swelling.      Right hip: Tenderness present. Decreased range of motion.      Right knee: Decreased range of motion. Tenderness present.      Comments: The patient is using an assistive device  during the visit     Neurological:      Mental Status: He is alert.      Motor: Weakness present.      Gait: Gait abnormal.   Psychiatric:         Mood and Affect: Mood normal.         Behavior: Behavior normal.         Thought Content: Thought content normal.         Judgment: Judgment normal.         Laboratory Reviewed ({Yes)  Lab Results   Component Value Date    WBC 8.64 02/15/2024    HGB 11.5 (L) 02/15/2024    HCT 35.5 (L) 02/15/2024     02/15/2024    CHOL 154 02/12/2024    TRIG 59 02/12/2024    HDL 43 02/12/2024    ALT 37 03/21/2024    AST 23 03/21/2024     03/21/2024    K 4.4 03/21/2024     03/21/2024    CREATININE 1.0 03/21/2024    BUN 12 03/21/2024    CO2 29 03/21/2024    PSA 5.5 (H) 09/26/2022    INR 1.0 02/12/2024    HGBA1C 10.9 (H) 03/21/2024     Tramaine was seen today for follow-up.    Diagnoses and all orders for this visit:    Type 2 diabetes mellitus with hyperglycemia, without long-term current use of insulin  -     Hemoglobin A1C; Future    Primary hypertension  At visit, Blood pressure is at goal. Continue current medications      Right hip pain  -     X-Ray Hip 2 or 3 views Right with Pelvis when performed; Future  -     Ambulatory referral/consult to Orthopedics; Future    Bilateral primary osteoarthritis of knee  -     X-ray Knee Ortho Right with Flexion; Future    Colon cancer screening  -     Cologuard Screening (Multitarget Stool DNA); Future  -     Cologuard Screening (Multitarget Stool DNA)      Offered PT. Patient denies  Will schedule follow up appt with Ortho  Patient is using an assistive device at visit. Disused fall prevention      Coronary artery disease involving native coronary artery of native heart without angina pectoris  On Brilinta and Pravachol 80 mg   Next follow up appt with cards in Aug 2024     NSTEMI (non-ST elevated myocardial infarction)  On Brilinta and Pravachol 80 mg   Next follow up appt with cards in Aug 2024   Denies CP at visit     Labs and  xray today  DM eye exam   Will keep on current dose of medication for now      Follow up exam with Dr. Doyle in 3 months     Informed the patient that they can go to local their local pharmacy for recommend vaccines     Visit today included increased complexity associated with the care of the episodic problem DM  , which was addressed while instituting co-management of the longitudinal care of the patient due to the serious and/or complex managed problem(s) .    I have evaluated and discussed management associated with medical care services that serve as the continuing focal point for all needed health care services and/or with medical care services that are part of ongoing care related to my patient's single, serious condition or a complex condition(s).    I am providing ongoing care and I am the primary care provider for this patient, and they are being managed, monitored, and/or observed for their chronic conditions over time.     I have addressed their ongoing health maintenance requirements and needs for all health care services and reviewed co-management plans provided by specialty providers when available.    Health Maintenance Due   Topic Date Due    TETANUS VACCINE  Never done    Colorectal Cancer Screening  Never done    Shingles Vaccine (1 of 2) Never done    RSV Vaccine (Age 60+ and Pregnant patients) (1 - 1-dose 60+ series) Never done    Abdominal Aortic Aneurysm Screening  Never done    Eye Exam  11/28/2023    COVID-19 Vaccine (5 - 2023-24 season) 02/12/2024    Hemoglobin A1c  06/21/2024          Care Plan/Goals: Reviewed    Goals    None         Follow up: No follow-ups on file.    After visit summary was printed and given to patient upon discharge today.  Patient goals and care plan are included in After Visit Summary.

## 2024-07-01 DIAGNOSIS — E11.65 TYPE 2 DIABETES MELLITUS WITH HYPERGLYCEMIA, WITHOUT LONG-TERM CURRENT USE OF INSULIN: ICD-10-CM

## 2024-07-01 DIAGNOSIS — E11.65 UNCONTROLLED TYPE 2 DIABETES MELLITUS WITH HYPERGLYCEMIA: Primary | ICD-10-CM

## 2024-07-01 DIAGNOSIS — Z79.4 TYPE 2 DIABETES MELLITUS WITH HYPERGLYCEMIA, WITH LONG-TERM CURRENT USE OF INSULIN: ICD-10-CM

## 2024-07-01 DIAGNOSIS — E11.65 TYPE 2 DIABETES MELLITUS WITH HYPERGLYCEMIA, WITH LONG-TERM CURRENT USE OF INSULIN: ICD-10-CM

## 2024-07-01 RX ORDER — INSULIN GLARGINE 100 [IU]/ML
20 INJECTION, SOLUTION SUBCUTANEOUS NIGHTLY
Qty: 18 ML | Refills: 3 | Status: SHIPPED | OUTPATIENT
Start: 2024-07-01 | End: 2025-07-01

## 2024-07-02 ENCOUNTER — TELEPHONE (OUTPATIENT)
Dept: INTERNAL MEDICINE | Facility: CLINIC | Age: 69
End: 2024-07-02
Payer: MEDICARE

## 2024-07-02 DIAGNOSIS — E11.65 UNCONTROLLED TYPE 2 DIABETES MELLITUS WITH HYPERGLYCEMIA: Primary | ICD-10-CM

## 2024-07-02 NOTE — TELEPHONE ENCOUNTER
Returned patient's call in regards to needing cheaper medication. Patient says that the Jardiance that was sent in for him is too expensive. Says even after his insurance pays, he is responsible for a little over $100 out of pocket each fill. Patient says that he is unable to afford that at this time.    Also provided patient with his lab results. Patient says he has his insulin and will increase to 20 units as instructed.

## 2024-07-03 RX ORDER — NITROGLYCERIN 0.4 MG/1
0.4 TABLET SUBLINGUAL EVERY 5 MIN PRN
Qty: 25 TABLET | Refills: 2 | OUTPATIENT
Start: 2024-07-03 | End: 2025-07-03

## 2024-07-03 NOTE — TELEPHONE ENCOUNTER
Refill Routing Note   Medication(s) are not appropriate for processing by [facility] for the following reason(s):        Outside of protocol    [facility] action(s):  Route               Appointments  past 12m or future 3m with PCP    Date Provider   Last Visit   1/25/2024 Jeane Doyle MD   Next Visit   10/9/2024 Jeane Doyle MD   ED visits in past 90 days: 0        Note composed:5:06 PM 07/03/2024

## 2024-07-03 NOTE — TELEPHONE ENCOUNTER
No care due was identified.  [facility] Embedded Care Due Messages. Reference number: [id number].   7/03/2024 1:31:15 PM CDT

## 2024-07-03 NOTE — TELEPHONE ENCOUNTER
----- Message from Shabana Neri sent at 7/3/2024  1:32 PM CDT -----  Contact: Tramaine   Requesting an RX refill or new RX.    Is this a refill or new RX: refill     RX name and strength (copy/paste from chart):  Nitroglycerin     Is this a 30 day or 90 day RX:     Pharmacy name and phone # (copy/paste from chart):[facility] [address] in [place]       The doctors have asked that we provide their patients with the following 2 reminders -- prescription refills can take up to 72 hours, and a friendly reminder that in the future you can use your [facility] account to request refills:

## 2024-07-03 NOTE — TELEPHONE ENCOUNTER
No care due was identified.  [facility] Embedded Care Due Messages. Reference number: [id number].   7/03/2024 1:54:55 PM CDT

## 2024-07-05 RX ORDER — NITROGLYCERIN 0.4 MG/1
0.4 TABLET SUBLINGUAL EVERY 5 MIN PRN
Qty: 25 TABLET | Refills: 2 | OUTPATIENT
Start: 2024-07-05 | End: 2025-07-05

## 2024-07-25 ENCOUNTER — OFFICE VISIT (OUTPATIENT)
Dept: OPHTHALMOLOGY | Facility: CLINIC | Age: 69
End: 2024-07-25
Payer: MEDICARE

## 2024-07-25 DIAGNOSIS — H25.13 NUCLEAR SCLEROSIS OF BOTH EYES: ICD-10-CM

## 2024-07-25 DIAGNOSIS — E11.9 DIABETES MELLITUS TYPE 2 WITHOUT RETINOPATHY: Primary | ICD-10-CM

## 2024-07-25 PROCEDURE — 99999 PR PBB SHADOW E&M-EST. PATIENT-LVL III: CPT | Mod: PBBFAC,HCNC,, | Performed by: OPHTHALMOLOGY

## 2024-07-25 NOTE — PROGRESS NOTES
HPI     Diabetic Eye Exam     Additional comments: Patient states OU is doing well, happy with OTC   readers for small print and defers refraction.      FBS:has not been checking   Lab Results       Component                Value               Date                       HGBA1C                   10.6 (H)            06/28/2024                      Comments    1. Diabetic   2. Cataracts OU  3. Refractive error             Last edited by Mavis Jauregui, Patient Care Assistant on 7/25/2024  9:03   AM.            Assessment /Plan     For exam results, see Encounter Report.    Diabetes mellitus type 2 without retinopathy  Last A1c 10.6 . Diabetes uncontrolled with no diabetic retinopathy on dilated exam. Reviewed diabetic eye precautions including excellent blood sugar control, and importance of regular follow up.      Nuclear sclerosis, both eyes  Cataracts are present but not visually significant. Will continue to monitor.     RTC year sooner if needed

## 2024-07-31 ENCOUNTER — OFFICE VISIT (OUTPATIENT)
Dept: INTERNAL MEDICINE | Facility: CLINIC | Age: 69
End: 2024-07-31
Payer: MEDICARE

## 2024-07-31 VITALS
HEIGHT: 70 IN | OXYGEN SATURATION: 99 % | DIASTOLIC BLOOD PRESSURE: 88 MMHG | HEART RATE: 62 BPM | SYSTOLIC BLOOD PRESSURE: 164 MMHG | BODY MASS INDEX: 26.82 KG/M2 | WEIGHT: 187.38 LBS

## 2024-07-31 DIAGNOSIS — E11.69 HYPERLIPIDEMIA ASSOCIATED WITH TYPE 2 DIABETES MELLITUS: ICD-10-CM

## 2024-07-31 DIAGNOSIS — Z59.41 FOOD INSECURITY: ICD-10-CM

## 2024-07-31 DIAGNOSIS — Z74.09 OTHER REDUCED MOBILITY: ICD-10-CM

## 2024-07-31 DIAGNOSIS — E78.5 HYPERLIPIDEMIA ASSOCIATED WITH TYPE 2 DIABETES MELLITUS: ICD-10-CM

## 2024-07-31 DIAGNOSIS — I10 PRIMARY HYPERTENSION: ICD-10-CM

## 2024-07-31 DIAGNOSIS — R80.9 DIABETES MELLITUS WITH MICROALBUMINURIA: ICD-10-CM

## 2024-07-31 DIAGNOSIS — Z00.00 ENCOUNTER FOR PREVENTIVE HEALTH EXAMINATION: Primary | ICD-10-CM

## 2024-07-31 DIAGNOSIS — Z59.9 FINANCIAL DIFFICULTIES: ICD-10-CM

## 2024-07-31 DIAGNOSIS — Z99.89 DEPENDENCE ON OTHER ENABLING MACHINES AND DEVICES: ICD-10-CM

## 2024-07-31 DIAGNOSIS — I25.10 CORONARY ARTERY DISEASE INVOLVING NATIVE CORONARY ARTERY OF NATIVE HEART WITHOUT ANGINA PECTORIS: ICD-10-CM

## 2024-07-31 DIAGNOSIS — E11.29 DIABETES MELLITUS WITH MICROALBUMINURIA: ICD-10-CM

## 2024-07-31 PROCEDURE — 3079F DIAST BP 80-89 MM HG: CPT | Mod: HCNC,CPTII,S$GLB, | Performed by: NURSE PRACTITIONER

## 2024-07-31 PROCEDURE — 3046F HEMOGLOBIN A1C LEVEL >9.0%: CPT | Mod: HCNC,CPTII,S$GLB, | Performed by: NURSE PRACTITIONER

## 2024-07-31 PROCEDURE — 3288F FALL RISK ASSESSMENT DOCD: CPT | Mod: HCNC,CPTII,S$GLB, | Performed by: NURSE PRACTITIONER

## 2024-07-31 PROCEDURE — 3060F POS MICROALBUMINURIA REV: CPT | Mod: HCNC,CPTII,S$GLB, | Performed by: NURSE PRACTITIONER

## 2024-07-31 PROCEDURE — 1101F PT FALLS ASSESS-DOCD LE1/YR: CPT | Mod: HCNC,CPTII,S$GLB, | Performed by: NURSE PRACTITIONER

## 2024-07-31 PROCEDURE — 99999 PR PBB SHADOW E&M-EST. PATIENT-LVL V: CPT | Mod: PBBFAC,HCNC,, | Performed by: NURSE PRACTITIONER

## 2024-07-31 PROCEDURE — 1170F FXNL STATUS ASSESSED: CPT | Mod: HCNC,CPTII,S$GLB, | Performed by: NURSE PRACTITIONER

## 2024-07-31 PROCEDURE — 1158F ADVNC CARE PLAN TLK DOCD: CPT | Mod: HCNC,CPTII,S$GLB, | Performed by: NURSE PRACTITIONER

## 2024-07-31 PROCEDURE — 1126F AMNT PAIN NOTED NONE PRSNT: CPT | Mod: HCNC,CPTII,S$GLB, | Performed by: NURSE PRACTITIONER

## 2024-07-31 PROCEDURE — 1160F RVW MEDS BY RX/DR IN RCRD: CPT | Mod: HCNC,CPTII,S$GLB, | Performed by: NURSE PRACTITIONER

## 2024-07-31 PROCEDURE — G0439 PPPS, SUBSEQ VISIT: HCPCS | Mod: HCNC,S$GLB,, | Performed by: NURSE PRACTITIONER

## 2024-07-31 PROCEDURE — 1159F MED LIST DOCD IN RCRD: CPT | Mod: HCNC,CPTII,S$GLB, | Performed by: NURSE PRACTITIONER

## 2024-07-31 PROCEDURE — 3077F SYST BP >= 140 MM HG: CPT | Mod: HCNC,CPTII,S$GLB, | Performed by: NURSE PRACTITIONER

## 2024-07-31 PROCEDURE — 3066F NEPHROPATHY DOC TX: CPT | Mod: HCNC,CPTII,S$GLB, | Performed by: NURSE PRACTITIONER

## 2024-07-31 SDOH — SOCIAL DETERMINANTS OF HEALTH (SDOH): FOOD INSECURITY: Z59.41

## 2024-07-31 SDOH — SOCIAL DETERMINANTS OF HEALTH (SDOH): PROBLEM RELATED TO HOUSING AND ECONOMIC CIRCUMSTANCES, UNSPECIFIED: Z59.9
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
## 2024-07-31 NOTE — PATIENT INSTRUCTIONS
Counseling and Referral of Other Preventative  (Italic type indicates deductible and co-insurance are waived)    Patient Name: Tramaine Resendiz  Today's Date: 7/31/2024    Health Maintenance       Date Due Completion Date    TETANUS VACCINE Never done ---    Colorectal Cancer Screening Never done ---    Shingles Vaccine (1 of 2) Never done ---    RSV Vaccine (Age 60+ and Pregnant patients) (1 - 1-dose 60+ series) Never done ---    Abdominal Aortic Aneurysm Screening Never done ---    COVID-19 Vaccine (5 - 2023-24 season) 02/12/2024 10/12/2023    Influenza Vaccine (1) 09/01/2024 10/4/2023    Hemoglobin A1c 09/28/2024 6/28/2024    Foot Exam 01/25/2025 1/25/2024    Lipid Panel 02/12/2025 2/12/2024    Diabetes Urine Screening 03/21/2025 3/21/2024    High Dose Statin 07/25/2025 7/25/2024    Eye Exam 07/25/2025 7/25/2024        Orders Placed This Encounter   Procedures    Ambulatory referral/consult to Outpatient Case Management       The following information is provided to all patients.  This information is to help you find resources for any of the problems found today that may be affecting your health:                  Living healthy guide: www.[facility].[place].gov      Understanding Diabetes: www.diabetes.org      Eating healthy: www.cdc.gov/healthyweight      [facility] home safety checklist: www.cdc.gov/steadi/patient.html      Agency on Aging: www.goea.[place].[facility]      Alcoholics anonymous (AA): www.aa.org      Physical Activity: www.joanna.nih.gov/xg3xdhb      Tobacco use: www.quitwithusla.org

## 2024-08-01 ENCOUNTER — TELEPHONE (OUTPATIENT)
Dept: ORTHOPEDICS | Facility: CLINIC | Age: 69
End: 2024-08-01
Payer: MEDICARE

## 2024-08-01 DIAGNOSIS — M25.552 BILATERAL HIP PAIN: ICD-10-CM

## 2024-08-01 DIAGNOSIS — M25.551 BILATERAL HIP PAIN: ICD-10-CM

## 2024-08-01 DIAGNOSIS — M25.562 PAIN IN BOTH KNEES, UNSPECIFIED CHRONICITY: Primary | ICD-10-CM

## 2024-08-01 DIAGNOSIS — M25.561 PAIN IN BOTH KNEES, UNSPECIFIED CHRONICITY: Primary | ICD-10-CM

## 2024-08-01 NOTE — TELEPHONE ENCOUNTER
Called the patient in regards to their appointment on 9/30/24 with Dr. Cline. Informed the patient that I need to reschedule their appointment due to the fact that Dr. Cline will be out of the office for two weeks. I got the patient reschedule for 9/5/24 at 9:40 with 9:15 x-rays. Verbalized understanding.

## 2024-08-02 ENCOUNTER — OFFICE VISIT (OUTPATIENT)
Dept: DIABETES | Facility: CLINIC | Age: 69
End: 2024-08-02
Payer: MEDICARE

## 2024-08-02 VITALS
HEART RATE: 90 BPM | SYSTOLIC BLOOD PRESSURE: 133 MMHG | WEIGHT: 186.06 LBS | BODY MASS INDEX: 26.49 KG/M2 | DIASTOLIC BLOOD PRESSURE: 85 MMHG

## 2024-08-02 DIAGNOSIS — E78.5 HYPERLIPIDEMIA ASSOCIATED WITH TYPE 2 DIABETES MELLITUS: ICD-10-CM

## 2024-08-02 DIAGNOSIS — E11.22 TYPE 2 DIABETES MELLITUS WITH CHRONIC KIDNEY DISEASE, WITH LONG-TERM CURRENT USE OF INSULIN, UNSPECIFIED CKD STAGE: ICD-10-CM

## 2024-08-02 DIAGNOSIS — I25.10 CORONARY ARTERY DISEASE INVOLVING NATIVE CORONARY ARTERY OF NATIVE HEART WITHOUT ANGINA PECTORIS: ICD-10-CM

## 2024-08-02 DIAGNOSIS — I10 PRIMARY HYPERTENSION: ICD-10-CM

## 2024-08-02 DIAGNOSIS — E11.69 HYPERLIPIDEMIA ASSOCIATED WITH TYPE 2 DIABETES MELLITUS: ICD-10-CM

## 2024-08-02 DIAGNOSIS — E11.65 UNCONTROLLED TYPE 2 DIABETES MELLITUS WITH HYPERGLYCEMIA: Primary | ICD-10-CM

## 2024-08-02 DIAGNOSIS — Z79.4 TYPE 2 DIABETES MELLITUS WITH CHRONIC KIDNEY DISEASE, WITH LONG-TERM CURRENT USE OF INSULIN, UNSPECIFIED CKD STAGE: ICD-10-CM

## 2024-08-02 LAB — GLUCOSE SERPL-MCNC: 241 MG/DL (ref 70–110)

## 2024-08-02 PROCEDURE — 99999 PR PBB SHADOW E&M-EST. PATIENT-LVL IV: CPT | Mod: PBBFAC,HCNC,, | Performed by: NURSE PRACTITIONER

## 2024-08-02 RX ORDER — PEN NEEDLE, DIABETIC 30 GX3/16"
1 NEEDLE, DISPOSABLE MISCELLANEOUS DAILY
Qty: 100 EACH | Refills: 1 | Status: SHIPPED | OUTPATIENT
Start: 2024-08-02

## 2024-08-02 RX ORDER — INSULIN GLARGINE 100 [IU]/ML
INJECTION, SOLUTION SUBCUTANEOUS
Qty: 15 ML | Refills: 1 | Status: SHIPPED | OUTPATIENT
Start: 2024-08-02

## 2024-08-02 NOTE — PROGRESS NOTES
Patient ID: Tramaine Resendiz is a 68 y.o. male.  Patient's current PCP is Jeane Doyle MD.     Chief Complaint: Diabetes Mellitus    HPI  Tramaine Resendiz is a 68 y.o. Black or  male presenting for a new consult for diabetes. Patient has been diagnosed with type 2 diabetes for > 10 years.    Recent diabetes related hospitalizations: MI 2/12/24  Pertinent to decision making is/are the following comorbidities:  Htn  Hln  Cad    Complications related to diabetes: nephropathy and cardiovascular disease    Current issues:Hyperglycemia, Cost of medications and copay too high      CURRENT DM MEDICATIONS:   Diabetes Medications               insulin glargine U-100, Lantus, (LANTUS SOLOSTAR U-100 INSULIN) 100 unit/mL (3 mL) InPn pen Inject 20 Units into the skin every evening.    empagliflozin (JARDIANCE) 25 mg tablet Take 1 tablet (25 mg total) by mouth once daily  Not taking.          Past failed treatment(s) include:   Glyburide  Ozempic - too expensive    His blood sugar in the clinic today was:   Lab Results   Component Value Date    POCGLU 241 (A) 08/02/2024       Blood glucose testing is not performed. Patient is testing 0 times per day.  Meter/cgm: -  Any episodes of hypoglycemia? no   Glucose trends: unknown        Current diet: drinking coffee and tea with sugar with occasional soda. Eating 3 meals per day  Activity level: Active  Occupation:Retired [employer]/,   Previous diabetes education: no      STANDARDS OF CARE  Eye exam: 7/25/24  Foot exam: UTD  Ace/Arb: -  Statin: pravastatin 80 prescribed - ? If taking  ASA: 81/ Brilinta    Preferred lab site: [facility]  Preferred visit site: [facility]    Wt Readings from Last 3 Encounters:   08/02/24 0659 84.4 kg (186 lb 1.1 oz)   07/31/24 0926 85 kg (187 lb 6.3 oz)   06/28/24 0919 84 kg (185 lb 3 oz)     Labs reviewed and are noted below.    Lab Results   Component Value Date    HGBA1C 10.6 (H) 06/28/2024    HGBA1C 10.9 (H)  "03/21/2024    HGBA1C 11.7 (H) 02/12/2024     Lab Results   Component Value Date    WBC 8.64 02/15/2024    HGB 11.5 (L) 02/15/2024    HCT 35.5 (L) 02/15/2024     02/15/2024    CHOL 154 02/12/2024    TRIG 59 02/12/2024    HDL 43 02/12/2024    LDLCALC 99.2 02/12/2024    ALT 37 03/21/2024    AST 23 03/21/2024     03/21/2024    K 4.4 03/21/2024     03/21/2024    ANIONGAP 6 (L) 03/21/2024    CREATININE 1.0 03/21/2024    ESTGFRAFRICA >60 01/08/2019    EGFRNONAA >60 01/08/2019    BUN 12 03/21/2024    CO2 29 03/21/2024    PSA 5.5 (H) 09/26/2022    INR 1.0 02/12/2024     (H) 03/21/2024     Lab Results   Component Value Date    ZUESULFH32 822 10/04/2023    CALCIUM 9.9 03/21/2024     No results found for: "CPEPTIDE"  No results found for: "GLUTAMICACID"  Glucose   Date Value Ref Range Status   03/21/2024 206 (H) 70 - 110 mg/dL Final     Anion Gap   Date Value Ref Range Status   03/21/2024 6 (L) 8 - 16 mmol/L Final     eGFR if    Date Value Ref Range Status   01/08/2019 >60 >60 mL/min/1.73 m^2 Final     eGFR if non    Date Value Ref Range Status   01/08/2019 >60 >60 mL/min/1.73 m^2 Final     Comment:     Calculation used to obtain the estimated glomerular filtration  rate (eGFR) is the CKD-EPI equation.              Review of patient's allergies indicates:   Allergen Reactions    Tomato      Social History     Socioeconomic History    Marital status:    Tobacco Use    Smoking status: Former     Types: Cigarettes    Smokeless tobacco: Never   Substance and Sexual Activity    Alcohol use: Not Currently    Drug use: Never    Sexual activity: Not Currently     Social Determinants of Health     Financial Resource Strain: High Risk (7/31/2024)    Overall Financial Resource Strain (CARDIA)     Difficulty of Paying Living Expenses: Hard   Food Insecurity: Food Insecurity Present (7/31/2024)    Hunger Vital Sign     Worried About Running Out of Food in the Last Year: Often " true     Ran Out of Food in the Last Year: Never true   Transportation Needs: No Transportation Needs (7/31/2024)    PRAPARE - Transportation     Lack of Transportation (Medical): No     Lack of Transportation (Non-Medical): No   Physical Activity: Insufficiently Active (7/31/2024)    Exercise Vital Sign     Days of Exercise per Week: 3 days     Minutes of Exercise per Session: 30 min   Stress: No Stress Concern Present (7/31/2024)    [place] [employer] - Occupational Stress Questionnaire     Feeling of Stress : Not at all   Housing Stability: Low Risk  (7/31/2024)    Housing Stability Vital Sign     Unable to Pay for Housing in the Last Year: No     Homeless in the Last Year: No     Past Medical History:   Diagnosis Date    Heavy drinker     cut back around 2018/2019    Hypertension     Type 2 diabetes mellitus without complications 2012    BS didn't check 11/28/2022       Review of Systems   Constitutional:  Negative for malaise/fatigue and weight loss.   Eyes:  Negative for blurred vision and double vision.   Respiratory:  Negative for shortness of breath.    Cardiovascular: Negative.    Gastrointestinal: Negative.    Genitourinary:  Negative for frequency.   Musculoskeletal:  Negative for myalgias.   Neurological: Negative.    Psychiatric/Behavioral: Negative.           Physical Exam  Vitals reviewed.   Constitutional:       General: He is not in acute distress.     Appearance: Normal appearance.   Eyes:      Conjunctiva/sclera: Conjunctivae normal.      Pupils: Pupils are equal, round, and reactive to light.   Neck:      Thyroid: No thyroid mass, thyromegaly or thyroid tenderness.      Vascular: No carotid bruit.   Cardiovascular:      Rate and Rhythm: Normal rate and regular rhythm.      Pulses: Normal pulses.      Heart sounds: Normal heart sounds.   Pulmonary:      Effort: Pulmonary effort is normal.      Breath sounds: Normal breath sounds.   Abdominal:      General: Bowel sounds are  "normal.      Palpations: Abdomen is soft.   Musculoskeletal:      Cervical back: Normal range of motion and neck supple.      Right lower leg: No edema.      Left lower leg: No edema.   Skin:     General: Skin is warm and dry.      Comments: Sites without hypertrophy and/or infection    FOOT EVALUATION: 10 gram monofilament exam with protective sensation intact bilaterally. Nails appropriately trimmed. No ulcers. Distal pulses palpable.     Neurological:      General: No focal deficit present.      Mental Status: He is alert and oriented to person, place, and time.   Psychiatric:         Mood and Affect: Mood normal.         Behavior: Behavior normal.             Assessment & Plan    1. Uncontrolled type 2 diabetes mellitus with hyperglycemia - poor control in setting of poor health literacy and issues with costs of care  -     Ambulatory referral/consult to Diabetic Advanced Practice Providers (Medical Management)  -     POCT Glucose, Hand-Held Device  -     insulin glargine U-100, Lantus, (LANTUS SOLOSTAR U-100 INSULIN) 100 unit/mL (3 mL) InPn pen; Inject up to 50 units per day  Dispense: 15 mL; Refill: 1  -     pen needle, diabetic 32 gauge x 5/32" Ndle; 1 each by Misc.(Non-Drug; Combo Route) route once daily.  Dispense: 100 each; Refill: 1  -     Continue Lantus 20 units daily  -     Inquire on eligibility for Dual Eligibility thru [employer]. If does not qualify - pursue patient assistance for medications  -     Consider Jardiance and/or prandial insulin vs mixed insulin    2. Type 2 diabetes mellitus with chronic kidney disease, with long-term current use of insulin, unspecified CKD stage    3. Primary hypertension    4. Hyperlipidemia associated with type 2 diabetes mellitus    5. Coronary artery disease involving native coronary artery of native heart without angina pectoris     Question compliance with all medications due to cost. Plan as above and will review meds once completed. Stressed with patient " benefits/risks of not taking medications. Patient verbalizes understanding      - Follow up:  4 weeks    Visit today included increased complexity associated with the care of the episodic problem hyperglycemia addressed and managing the longitudinal care of the patient due to the serious and/or complex managed problem(s) T2DM.

## 2024-08-02 NOTE — PATIENT INSTRUCTIONS
Lantus 20 units every day    Try Splenda instead of sugar in coffee and tea    Call the [facility] phone number to check on Dual Eligibility: [phone]

## 2024-08-04 PROCEDURE — 93010 ELECTROCARDIOGRAM REPORT: CPT | Mod: HCNC,,, | Performed by: INTERNAL MEDICINE

## 2024-08-05 ENCOUNTER — OFFICE VISIT (OUTPATIENT)
Dept: INTERNAL MEDICINE | Facility: CLINIC | Age: 69
End: 2024-08-05
Payer: MEDICARE

## 2024-08-05 ENCOUNTER — HOSPITAL ENCOUNTER (OUTPATIENT)
Dept: CARDIOLOGY | Facility: HOSPITAL | Age: 69
Discharge: HOME | End: 2024-08-05
Payer: MEDICARE

## 2024-08-05 VITALS
BODY MASS INDEX: 26.01 KG/M2 | WEIGHT: 181.69 LBS | HEART RATE: 79 BPM | HEIGHT: 70 IN | OXYGEN SATURATION: 100 % | SYSTOLIC BLOOD PRESSURE: 144 MMHG | DIASTOLIC BLOOD PRESSURE: 92 MMHG | TEMPERATURE: 97 F

## 2024-08-05 DIAGNOSIS — R06.02 SHORTNESS OF BREATH: ICD-10-CM

## 2024-08-05 DIAGNOSIS — I10 PRIMARY HYPERTENSION: ICD-10-CM

## 2024-08-05 DIAGNOSIS — U07.1 COVID: Primary | ICD-10-CM

## 2024-08-05 DIAGNOSIS — Z79.4 TYPE 2 DIABETES MELLITUS WITH CHRONIC KIDNEY DISEASE, WITH LONG-TERM CURRENT USE OF INSULIN, UNSPECIFIED CKD STAGE: ICD-10-CM

## 2024-08-05 DIAGNOSIS — I25.10 CORONARY ARTERY DISEASE INVOLVING NATIVE CORONARY ARTERY OF NATIVE HEART WITHOUT ANGINA PECTORIS: ICD-10-CM

## 2024-08-05 DIAGNOSIS — J40 BRONCHITIS: ICD-10-CM

## 2024-08-05 DIAGNOSIS — E11.22 TYPE 2 DIABETES MELLITUS WITH CHRONIC KIDNEY DISEASE, WITH LONG-TERM CURRENT USE OF INSULIN, UNSPECIFIED CKD STAGE: ICD-10-CM

## 2024-08-05 LAB
CTP QC/QA: YES
OHS QRS DURATION: 94 MS
OHS QTC CALCULATION: 429 MS
SARS-COV-2 RDRP RESP QL NAA+PROBE: POSITIVE

## 2024-08-05 PROCEDURE — 99214 OFFICE O/P EST MOD 30 MIN: CPT | Mod: HCNC,S$GLB,, | Performed by: FAMILY MEDICINE

## 2024-08-05 PROCEDURE — 3077F SYST BP >= 140 MM HG: CPT | Mod: HCNC,CPTII,S$GLB, | Performed by: FAMILY MEDICINE

## 2024-08-05 PROCEDURE — 93005 ELECTROCARDIOGRAM TRACING: CPT | Mod: HCNC | Performed by: INTERNAL MEDICINE

## 2024-08-05 PROCEDURE — 3046F HEMOGLOBIN A1C LEVEL >9.0%: CPT | Mod: HCNC,CPTII,S$GLB, | Performed by: FAMILY MEDICINE

## 2024-08-05 PROCEDURE — 3080F DIAST BP >= 90 MM HG: CPT | Mod: HCNC,CPTII,S$GLB, | Performed by: FAMILY MEDICINE

## 2024-08-05 PROCEDURE — 1126F AMNT PAIN NOTED NONE PRSNT: CPT | Mod: HCNC,CPTII,S$GLB, | Performed by: FAMILY MEDICINE

## 2024-08-05 PROCEDURE — 87635 SARS-COV-2 COVID-19 AMP PRB: CPT | Mod: QW,HCNC,S$GLB, | Performed by: FAMILY MEDICINE

## 2024-08-05 PROCEDURE — 3060F POS MICROALBUMINURIA REV: CPT | Mod: HCNC,CPTII,S$GLB, | Performed by: FAMILY MEDICINE

## 2024-08-05 PROCEDURE — 3008F BODY MASS INDEX DOCD: CPT | Mod: HCNC,CPTII,S$GLB, | Performed by: FAMILY MEDICINE

## 2024-08-05 PROCEDURE — 1159F MED LIST DOCD IN RCRD: CPT | Mod: HCNC,CPTII,S$GLB, | Performed by: FAMILY MEDICINE

## 2024-08-05 PROCEDURE — G2211 COMPLEX E/M VISIT ADD ON: HCPCS | Mod: HCNC,S$GLB,, | Performed by: FAMILY MEDICINE

## 2024-08-05 PROCEDURE — 99999 PR PBB SHADOW E&M-EST. PATIENT-LVL IV: CPT | Mod: PBBFAC,HCNC,, | Performed by: FAMILY MEDICINE

## 2024-08-05 PROCEDURE — 3066F NEPHROPATHY DOC TX: CPT | Mod: HCNC,CPTII,S$GLB, | Performed by: FAMILY MEDICINE

## 2024-08-05 RX ORDER — PROMETHAZINE HYDROCHLORIDE AND DEXTROMETHORPHAN HYDROBROMIDE 6.25; 15 MG/5ML; MG/5ML
5 SYRUP ORAL EVERY 8 HOURS PRN
Qty: 118 ML | Refills: 0 | Status: SHIPPED | OUTPATIENT
Start: 2024-08-05 | End: 2024-08-15

## 2024-08-05 RX ORDER — DOXYCYCLINE 100 MG/1
100 CAPSULE ORAL EVERY 12 HOURS
Qty: 20 CAPSULE | Refills: 0 | Status: CANCELLED | OUTPATIENT
Start: 2024-08-05

## 2024-08-05 RX ORDER — NIRMATRELVIR AND RITONAVIR 300-100 MG
KIT ORAL
Qty: 30 TABLET | Refills: 0 | Status: SHIPPED | OUTPATIENT
Start: 2024-08-05

## 2024-08-06 ENCOUNTER — OUTPATIENT CASE MANAGEMENT (OUTPATIENT)
Dept: ADMINISTRATIVE | Facility: OTHER | Age: 69
End: 2024-08-06
Payer: MEDICARE

## 2024-08-07 ENCOUNTER — TELEPHONE (OUTPATIENT)
Dept: PHARMACY | Facility: CLINIC | Age: 69
End: 2024-08-07
Payer: MEDICARE

## 2024-08-29 NOTE — PROGRESS NOTES
Patient ID: Tramaine Resendiz is a 68 y.o. male.  Patient's current PCP is Jeane Doyle MD.     Chief Complaint: Diabetes Mellitus    HPI  Tramaine Resendiz is a 68 y.o. Black or  male presenting for a follow up for diabetes. Patient has been diagnosed with type 2 diabetes for > 10 years.    Recent diabetes related hospitalizations: MI 2/12/24  Pertinent to decision making is/are the following comorbidities:  Htn  Hln  Cad    Complications related to diabetes: nephropathy and cardiovascular disease    Changes made at last visit:  -     Continue Lantus 20 units daily  -     Inquire on eligibility for Dual Eligibility thru [employer]. If does not qualify - pursue     patient assistance for medications  -     Consider Jardiance and/or prandial insulin vs mixed insulin    Current issues:Hyperglycemia, Cost of medications and copay too high. Right leg pain - worse when sitting. + cramping, does not sound like claudication.  Dxd with Covid shortly after last visit. No residual issues per patient    CURRENT DM MEDICATIONS:   Diabetes Medications               insulin glargine U-100, Lantus, (LANTUS SOLOSTAR U-100 INSULIN) 100 unit/mL (3 mL) InPn pen Inject 20 Units into the skin every evening.    empagliflozin (JARDIANCE) 25 mg tablet Take 1 tablet (25 mg total) by mouth once daily  Not taking.          Past failed treatment(s) include:   Glyburide  Ozempic - too expensive    His blood sugar in the clinic today was:   Lab Results   Component Value Date    POCGLU 332 (A) 08/30/2024       Meter/cgm: -  Blood glucose testing is not performed. Patient is testing 0 times per day.  Any episodes of hypoglycemia? no   Glucose trends: unknown    Current diet:  Eating 3 meals per day. Continues with poor discretion. Recent increase in etoh intake  Activity level: Active  Occupation:Retired [employer]/,   Previous diabetes education: no, declines      Diabetes Management Status    Statin:  "Taking  ACE/ARB: Not taking    Screening or Prevention Patient's value Goal Complete/Controlled?   HgA1C Testing and Control   Lab Results   Component Value Date    HGBA1C 10.6 (H) 06/28/2024      Annually/Less than 8% No   Lipid profile : 02/12/2024 Annually Yes   LDL control Lab Results   Component Value Date    LDLCALC 99.2 02/12/2024    Annually/Less than 100 mg/dl  Yes   Nephropathy screening Lab Results   Component Value Date    LABMICR 310.0 03/21/2024     No results found for: "PROTEINUA"  No results found for: "UTPCR"   Annually Yes   Blood pressure BP Readings from Last 1 Encounters:   08/30/24 133/71    Less than 140/90 No   Dilated retinal exam : 07/25/2024 Annually Yes   Foot exam   : 01/25/2024/ 8/7/24 Annually Yes       Preferred lab site: [facility]  Preferred visit site: [facility]    Wt Readings from Last 3 Encounters:   08/30/24 0815 82.1 kg (181 lb)   08/05/24 0845 82.4 kg (181 lb 10.5 oz)   08/02/24 0659 84.4 kg (186 lb 1.1 oz)     Labs reviewed and are noted below.    Lab Results   Component Value Date    HGBA1C 10.6 (H) 06/28/2024    HGBA1C 10.9 (H) 03/21/2024    HGBA1C 11.7 (H) 02/12/2024     Lab Results   Component Value Date    WBC 8.64 02/15/2024    HGB 11.5 (L) 02/15/2024    HCT 35.5 (L) 02/15/2024     02/15/2024    CHOL 154 02/12/2024    TRIG 59 02/12/2024    HDL 43 02/12/2024    LDLCALC 99.2 02/12/2024    ALT 37 03/21/2024    AST 23 03/21/2024     03/21/2024    K 4.4 03/21/2024     03/21/2024    ANIONGAP 6 (L) 03/21/2024    CREATININE 1.0 03/21/2024    ESTGFRAFRICA >60 01/08/2019    EGFRNONAA >60 01/08/2019    BUN 12 03/21/2024    CO2 29 03/21/2024    PSA 5.5 (H) 09/26/2022    INR 1.0 02/12/2024     (H) 03/21/2024     Lab Results   Component Value Date    YIEEXQES87 822 10/04/2023    CALCIUM 9.9 03/21/2024     No results found for: "CPEPTIDE"  No results found for: "GLUTAMICACID"  Glucose   Date Value Ref Range Status   03/21/2024 206 (H) 70 - 110 mg/dL Final     Anion " Gap   Date Value Ref Range Status   03/21/2024 6 (L) 8 - 16 mmol/L Final     eGFR if    Date Value Ref Range Status   01/08/2019 >60 >60 mL/min/1.73 m^2 Final     eGFR if non    Date Value Ref Range Status   01/08/2019 >60 >60 mL/min/1.73 m^2 Final     Comment:     Calculation used to obtain the estimated glomerular filtration  rate (eGFR) is the CKD-EPI equation.              Review of patient's allergies indicates:   Allergen Reactions    Tomato      Social History     Socioeconomic History    Marital status:    Tobacco Use    Smoking status: Former     Types: Cigarettes    Smokeless tobacco: Never   Substance and Sexual Activity    Alcohol use: Not Currently    Drug use: Never    Sexual activity: Not Currently     Social Determinants of Health     Financial Resource Strain: High Risk (7/31/2024)    Overall Financial Resource Strain (CARDIA)     Difficulty of Paying Living Expenses: Hard   Food Insecurity: Food Insecurity Present (7/31/2024)    Hunger Vital Sign     Worried About Running Out of Food in the Last Year: Often true     Ran Out of Food in the Last Year: Never true   Transportation Needs: No Transportation Needs (7/31/2024)    PRAPARE - Transportation     Lack of Transportation (Medical): No     Lack of Transportation (Non-Medical): No   Physical Activity: Insufficiently Active (7/31/2024)    Exercise Vital Sign     Days of Exercise per Week: 3 days     Minutes of Exercise per Session: 30 min   Stress: No Stress Concern Present (7/31/2024)    [employer] [employer] - Occupational Stress Questionnaire     Feeling of Stress : Not at all   Housing Stability: Low Risk  (7/31/2024)    Housing Stability Vital Sign     Unable to Pay for Housing in the Last Year: No     Homeless in the Last Year: No     Past Medical History:   Diagnosis Date    Heavy drinker     cut back around 2018/2019    Hypertension     Type 2 diabetes mellitus without complications  2012    BS didn't check 11/28/2022       Review of Systems   Constitutional:  Negative for malaise/fatigue and weight loss.   Eyes:  Negative for blurred vision and double vision.   Respiratory:  Negative for shortness of breath.    Cardiovascular: Negative.  Negative for chest pain, claudication and leg swelling.   Gastrointestinal: Negative.    Genitourinary:  Negative for frequency.   Musculoskeletal:  Positive for myalgias (cramps right leg). Negative for back pain and falls.   Neurological: Negative.    Psychiatric/Behavioral: Negative.           Physical Exam  Vitals reviewed.   Constitutional:       General: He is not in acute distress.     Appearance: Normal appearance.   Eyes:      Conjunctiva/sclera: Conjunctivae normal.      Pupils: Pupils are equal, round, and reactive to light.   Cardiovascular:      Rate and Rhythm: Normal rate and regular rhythm.      Pulses: Normal pulses.   Pulmonary:      Effort: Pulmonary effort is normal.   Musculoskeletal:         General: No swelling or tenderness.      Right lower leg: No edema.      Left lower leg: No edema.   Skin:     General: Skin is warm and dry.   Neurological:      General: No focal deficit present.      Mental Status: He is alert and oriented to person, place, and time.   Psychiatric:         Mood and Affect: Mood normal.         Behavior: Behavior normal.             Assessment & Plan    Type 2 diabetes mellitus with chronic kidney disease, with long-term current use of insulin, unspecified CKD stage - poor control in setting of poor health literacy  -     POCT Glucose, Hand-Held Device  -     Name and phone number of patient assistance department contact given as well as medications needed given to patient to follow up with. Patient needs to provide information in order to receive medications.  -     Stressed with patient need to take medications as prescribed EVEN IF not feeling bad at this time to prevent complications including MI,CVA and even  death.   -     Reviewed and reinforced diet recommendations and encouraged to decrease alcohol intake           - Follow up: 6 weeks    Visit today included increased complexity associated with the care of the episodic problem hyperglycemia addressed and managing the longitudinal care of the patient due to the serious and/or complex managed problem(s) T2DM.

## 2024-08-30 ENCOUNTER — OFFICE VISIT (OUTPATIENT)
Dept: DIABETES | Facility: CLINIC | Age: 69
End: 2024-08-30
Payer: MEDICARE

## 2024-08-30 VITALS
DIASTOLIC BLOOD PRESSURE: 71 MMHG | WEIGHT: 181 LBS | HEART RATE: 81 BPM | SYSTOLIC BLOOD PRESSURE: 133 MMHG | BODY MASS INDEX: 25.76 KG/M2

## 2024-08-30 DIAGNOSIS — E11.22 TYPE 2 DIABETES MELLITUS WITH CHRONIC KIDNEY DISEASE, WITH LONG-TERM CURRENT USE OF INSULIN, UNSPECIFIED CKD STAGE: Primary | ICD-10-CM

## 2024-08-30 DIAGNOSIS — Z79.4 TYPE 2 DIABETES MELLITUS WITH CHRONIC KIDNEY DISEASE, WITH LONG-TERM CURRENT USE OF INSULIN, UNSPECIFIED CKD STAGE: Primary | ICD-10-CM

## 2024-08-30 LAB — GLUCOSE SERPL-MCNC: 332 MG/DL (ref 70–110)

## 2024-08-30 PROCEDURE — 99999 PR PBB SHADOW E&M-EST. PATIENT-LVL IV: CPT | Mod: PBBFAC,HCNC,, | Performed by: NURSE PRACTITIONER

## 2024-08-30 NOTE — PATIENT INSTRUCTIONS
Please instruct the patient to reach out to Hien SOUSA @[phone] to help with getting medication free from companies:    Lantus insulin 20 units every day    Jardiance 25 mg pills

## 2024-09-03 ENCOUNTER — TELEPHONE (OUTPATIENT)
Dept: DIABETES | Facility: CLINIC | Age: 69
End: 2024-09-03
Payer: MEDICARE

## 2024-09-05 ENCOUNTER — HOSPITAL ENCOUNTER (OUTPATIENT)
Dept: RADIOLOGY | Facility: HOSPITAL | Age: 69
Discharge: HOME | End: 2024-09-05
Attending: ORTHOPAEDIC SURGERY
Payer: MEDICARE

## 2024-09-05 ENCOUNTER — OFFICE VISIT (OUTPATIENT)
Dept: ORTHOPEDICS | Facility: CLINIC | Age: 69
End: 2024-09-05
Payer: MEDICARE

## 2024-09-05 VITALS — BODY MASS INDEX: 26.04 KG/M2 | HEIGHT: 70 IN | WEIGHT: 181.88 LBS

## 2024-09-05 DIAGNOSIS — M25.551 BILATERAL HIP PAIN: ICD-10-CM

## 2024-09-05 DIAGNOSIS — M25.562 PAIN IN BOTH KNEES, UNSPECIFIED CHRONICITY: ICD-10-CM

## 2024-09-05 DIAGNOSIS — M94.261 CHONDROMALACIA, RIGHT KNEE: ICD-10-CM

## 2024-09-05 DIAGNOSIS — M94.262 CHONDROMALACIA, LEFT KNEE: ICD-10-CM

## 2024-09-05 DIAGNOSIS — M25.552 BILATERAL HIP PAIN: ICD-10-CM

## 2024-09-05 DIAGNOSIS — M25.561 PAIN IN BOTH KNEES, UNSPECIFIED CHRONICITY: ICD-10-CM

## 2024-09-05 DIAGNOSIS — M70.61 TROCHANTERIC BURSITIS OF RIGHT HIP: Primary | ICD-10-CM

## 2024-09-05 DIAGNOSIS — M25.551 RIGHT HIP PAIN: Primary | ICD-10-CM

## 2024-09-05 DIAGNOSIS — M25.551 RIGHT HIP PAIN: ICD-10-CM

## 2024-09-05 PROCEDURE — 73564 X-RAY EXAM KNEE 4 OR MORE: CPT | Mod: TC,50,HCNC

## 2024-09-05 PROCEDURE — 99999 PR PBB SHADOW E&M-EST. PATIENT-LVL IV: CPT | Mod: PBBFAC,HCNC,, | Performed by: ORTHOPAEDIC SURGERY

## 2024-09-05 PROCEDURE — 73521 X-RAY EXAM HIPS BI 2 VIEWS: CPT | Mod: TC,HCNC

## 2024-09-05 NOTE — PROGRESS NOTES
Subjective:     Patient ID: Tramaine Resendiz is a 68 y.o. male.    Chief Complaint: Pain of the Right Hip and Pain of the Right Knee    HPI:  09/05/2024   Recent fall off a boat.  Placed on my schedule for evaluation.  He feels a right leg gives out on him specially on the right knee.  Previously diagnosed with bursitis of his hips.  His hemoglobin A1c is above 10 and not controlled well.  He eats a lot of fruits.  You supposed to get some medication for free however he thinks that he should go and get it from the pharmacy himself.  He is using a cane to get around.  He states just the knee gives out on him and then he has pain on the right side mostly.  Occasional numbness in the anterior thigh.  He said he does not have any back pain any loss of bowel bladder control.  No fever no chills.  His pain is around 7/10.  Never been to physical therapy.  Used to have a motorcycle that was sold by his wife.  At this time his wife thinks that he should get rid of the boat   He is a patient of Dr. Hull in    Past Medical History:   Diagnosis Date    Heavy drinker     cut back around 2018/2019    Hypertension     Type 2 diabetes mellitus without complications 2012    BS didn't check 11/28/2022     Past Surgical History:   Procedure Laterality Date    CLOSURE DEVICE  2/13/2024    Procedure: Placement of Closure Device;  Surgeon: Matthew Orellana MD;  Location: [facility] CATH LAB;  Service: Cardiology;;    HERNIA REPAIR      IVUS, CORONARY  2/13/2024    Procedure: IVUS, Coronary;  Surgeon: Matthew Orellana MD;  Location: [facility] CATH LAB;  Service: Cardiology;;    LEFT HEART CATHETERIZATION Left 2/13/2024    Procedure: Left heart cath;  Surgeon: Matthew Orellana MD;  Location: [facility] CATH LAB;  Service: Cardiology;  Laterality: Left;    PERCUTANEOUS CORONARY INTERVENTION (PCI) FOR CHRONIC TOTAL OCCLUSION OF CORONARY ARTERY  2/13/2024    Procedure: PCI, FOR CHRONIC TOTAL CORONARY ARTERY OCCLUSION;  Surgeon: Matthew Orellana MD;   Location: [facility] CATH LAB;  Service: Cardiology;;    PERCUTANEOUS CORONARY INTERVENTION, ARTERY N/A 2/13/2024    Procedure: Percutaneous coronary intervention;  Surgeon: Matthew Orellana MD;  Location: [facility] CATH LAB;  Service: Cardiology;  Laterality: N/A;    PLACEMENT, IABP  2/13/2024    Procedure: Placement, IABP;  Surgeon: Matthew Orellana MD;  Location: [facility] CATH LAB;  Service: Cardiology;;    STENT, DRUG ELUTING, MULTI VESSEL, CORONARY  2/13/2024    Procedure: Stent, Drug Eluting, Multi Vessel, Coronary;  Surgeon: Matthew Orellana MD;  Location: [facility] CATH LAB;  Service: Cardiology;;    THROMBECTOMY, CORONARY  2/13/2024    Procedure: Thrombectomy, Coronary;  Surgeon: Matthew Orellana MD;  Location: [facility] CATH LAB;  Service: Cardiology;;     Family History   Problem Relation Name Age of Onset    Hypertension Mother      Diabetes Mother       Social History     Socioeconomic History    Marital status:    Tobacco Use    Smoking status: Former     Types: Cigarettes    Smokeless tobacco: Never   Substance and Sexual Activity    Alcohol use: Not Currently    Drug use: Never    Sexual activity: Not Currently     Social Determinants of Health     Financial Resource Strain: High Risk (7/31/2024)    Overall Financial Resource Strain (CARDIA)     Difficulty of Paying Living Expenses: Hard   Food Insecurity: Food Insecurity Present (7/31/2024)    Hunger Vital Sign     Worried About Running Out of Food in the Last Year: Often true     Ran Out of Food in the Last Year: Never true   Transportation Needs: No Transportation Needs (7/31/2024)    PRAPARE - Transportation     Lack of Transportation (Medical): No     Lack of Transportation (Non-Medical): No   Physical Activity: Insufficiently Active (7/31/2024)    Exercise Vital Sign     Days of Exercise per Week: 3 days     Minutes of Exercise per Session: 30 min   Stress: No Stress Concern Present (7/31/2024)    [employer] [place] [employer] - Occupational Stress  "Questionnaire     Feeling of Stress : Not at all   Housing Stability: Low Risk  (7/31/2024)    Housing Stability Vital Sign     Unable to Pay for Housing in the Last Year: No     Homeless in the Last Year: No     Medication List with Changes/Refills   Current Medications    ASPIRIN 81 MG CHEW    Take 1 tablet (81 mg total) by mouth once daily.    BLOOD SUGAR DIAGNOSTIC STRP    To check BG 1-2 times daily, to use with insurance preferred meter    BLOOD-GLUCOSE METER KIT    To check BG 1-2 times daily, to use with insurance preferred meter    EMPAGLIFLOZIN (JARDIANCE) 25 MG TABLET    Take 1 tablet (25 mg total) by mouth once daily.    INSULIN GLARGINE U-100, LANTUS, (LANTUS SOLOSTAR U-100 INSULIN) 100 UNIT/ML (3 ML) INPN PEN    Inject up to 50 units per day    LANCETS MISC    To check BG 1-2 times daily, to use with insurance preferred meter    METOPROLOL TARTRATE (LOPRESSOR) 25 MG TABLET    Take 1 tablet (25 mg total) by mouth 2 (two) times daily.    NIRMATRELVIR-RITONAVIR (PAXLOVID) 300 MG (150 MG X 2)-100 MG COPACKAGED TABLETS (EUA)    Take 3 tablets by mouth 2 (two) times daily. Each dose contains 2 nirmatrelvir (pink tablets) and 1 ritonavir (white tablet). Take all 3 tablets together    NITROGLYCERIN (NITROSTAT) 0.4 MG SL TABLET    Place 1 tablet (0.4 mg total) under the tongue every 5 (five) minutes as needed for Chest pain (angina).    ONDANSETRON (ZOFRAN-ODT) 4 MG TBDL    Take 1 tablet (4 mg total) by mouth every 6 (six) hours as needed (Nausea).    PEN NEEDLE, DIABETIC 32 GAUGE X 5/32" NDLE    1 each by Misc.(Non-Drug; Combo Route) route once daily.    PRAVASTATIN (PRAVACHOL) 80 MG TABLET    Take 1 tablet (80 mg total) by mouth once daily.    TICAGRELOR (BRILINTA) 90 MG TABLET    Take 1 tablet (90 mg total) by mouth 2 (two) times daily.     Review of patient's allergies indicates:   Allergen Reactions    Tomato      Review of Systems   Constitutional: Negative for decreased appetite.   HENT:  Negative for " tinnitus.    Eyes:  Negative for double vision.   Cardiovascular:  Negative for chest pain.   Respiratory:  Negative for wheezing.    Hematologic/Lymphatic: Negative for bleeding problem.   Skin:  Negative for dry skin.   Musculoskeletal:  Positive for muscle weakness. Negative for arthritis, back pain, gout, neck pain and stiffness.   Gastrointestinal:  Negative for abdominal pain.   Genitourinary:  Negative for bladder incontinence.   Neurological:  Negative for numbness, paresthesias and sensory change.   Psychiatric/Behavioral:  Negative for altered mental status.        Objective:   Body mass index is 26.1 kg/m².  There were no vitals filed for this visit.       General    Constitutional: He is oriented to person, place, and time. He appears well-developed.   HENT:   Head: Atraumatic.   Eyes: EOM are normal.   Pulmonary/Chest: Effort normal.   Neurological: He is alert and oriented to person, place, and time.   Psychiatric: Judgment normal.           Ambulating with a cane   Pelvis is level   Right hip tenderness over the greater trochanter to palpation.  Passive hip internal external rotation without pain in the groin.  Hip flexors, abductors and adductors are slightly weak at 5-/5  Left hip with full motion no pain in the groin.  No pain to palpation over the greater trochanter.  Hip flexors and abductors and adductors are 5/5   Right knee with medial joint tenderness.  There is crepitus to compression on the patella.  Collaterals and cruciates are stable.  He has full range of motion.  Minimal swelling if any.  There is no defect in the patella or quadriceps tendon.  Questionable Torin sign   Left knee with very mild if any joint tenderness.  Very mild crepitus on the patella.  Collaterals and cruciates are stable.  No defect in the patella or quadriceps tendon.  Negative Torin's   Calves are soft nontender   Ankle motion intact    Relevant imaging results reviewed and interpreted by me, discussed with  the patient and / or family today   X-ray 09/05/2024 bilateral hips good joint space.  No fracture seen.  Very early cystic changes in the superior acetabulum  X-ray 09/05/2024 bilateral knees no fracture seen.  Very small marginal osteophytes.  Joint space very well maintained consistent with chondromalacia  Assessment:     Encounter Diagnoses   Name Primary?    Right hip pain     Trochanteric bursitis of right hip Yes    Chondromalacia, right knee     Chondromalacia, left knee         Plan:   Trochanteric bursitis of right hip    Right hip pain  -     Ambulatory referral/consult to Orthopedics    Chondromalacia, right knee    Chondromalacia, left knee         Patient Instructions   Because of her heart condition please do not take ibuprofen or Motrin or Aleve over-the-counter  You may take Tylenol 650 mg 3 times a day if needed for pain   You have bursitis of the right hip and you have early arthritis of both of her knees   Your x-ray today did not show anything broken just mild arthritis in the knees but no surgery as needed   You using a cane to get around   You legs give out on you   Will start you on some physical therapy to strengthen your hip muscles as well as your leg muscles   We will give you a brace to the right knee   Will get you started at [facility] [address] physical therapy  You sugars are not controlled well so I can not give you injections in your hip or in the knee  Return in 3 month    Right banana, mangoes, watermelon, grapes will up her sugar quickly   You can have some Berry's like strawberries and blueberries as well as you can have apple and 1 orange a day and should not mess you up that much but within reason        Disclaimer: This note was prepared using a voice recognition system and is likely to have sound alike errors within the text.

## 2024-09-05 NOTE — PATIENT INSTRUCTIONS
Because of her heart condition please do not take ibuprofen or Motrin or Aleve over-the-counter  You may take Tylenol 650 mg 3 times a day if needed for pain   You have bursitis of the right hip and you have early arthritis of both of her knees   Your x-ray today did not show anything broken just mild arthritis in the knees but no surgery as needed   You using a cane to get around   You legs give out on you   Will start you on some physical therapy to strengthen your hip muscles as well as your leg muscles   We will give you a brace to the right knee   Will get you started at [facility] physical therapy  You sugars are not controlled well so I can not give you injections in your hip or in the knee  Return in 3 month    Right banana, mangoes, watermelon, grapes will up her sugar quickly   You can have some Berry's like strawberries and blueberries as well as you can have apple and 1 orange a day and should not mess you up that much but within reason

## 2024-09-14 ENCOUNTER — HOSPITAL ENCOUNTER (EMERGENCY)
Facility: HOSPITAL | Age: 69
Discharge: HOME | End: 2024-09-14
Attending: FAMILY MEDICINE
Payer: MEDICARE

## 2024-09-14 VITALS
BODY MASS INDEX: 26.33 KG/M2 | WEIGHT: 183.88 LBS | HEART RATE: 66 BPM | SYSTOLIC BLOOD PRESSURE: 168 MMHG | HEIGHT: 70 IN | TEMPERATURE: 98 F | RESPIRATION RATE: 17 BRPM | DIASTOLIC BLOOD PRESSURE: 79 MMHG | OXYGEN SATURATION: 99 %

## 2024-09-14 DIAGNOSIS — R07.9 CHEST PAIN: ICD-10-CM

## 2024-09-14 DIAGNOSIS — I10 PRIMARY HYPERTENSION: ICD-10-CM

## 2024-09-14 DIAGNOSIS — I20.89 ANGINA AT REST: Primary | ICD-10-CM

## 2024-09-14 LAB
ALBUMIN SERPL BCP-MCNC: 3.8 G/DL (ref 3.5–5.2)
ALP SERPL-CCNC: 95 U/L (ref 55–135)
ALT SERPL W/O P-5'-P-CCNC: 22 U/L (ref 10–44)
ANION GAP SERPL CALC-SCNC: 8 MMOL/L (ref 8–16)
AST SERPL-CCNC: 17 U/L (ref 10–40)
BACTERIA #/AREA URNS HPF: NORMAL /HPF
BASOPHILS # BLD AUTO: 0.02 K/UL (ref 0–0.2)
BASOPHILS NFR BLD: 0.3 % (ref 0–1.9)
BILIRUB SERPL-MCNC: 0.3 MG/DL (ref 0.1–1)
BILIRUB UR QL STRIP: NEGATIVE
BNP SERPL-MCNC: 83 PG/ML (ref 0–99)
BUN SERPL-MCNC: 10 MG/DL (ref 8–23)
CALCIUM SERPL-MCNC: 9.5 MG/DL (ref 8.7–10.5)
CHLORIDE SERPL-SCNC: 110 MMOL/L (ref 95–110)
CLARITY UR: CLEAR
CO2 SERPL-SCNC: 22 MMOL/L (ref 23–29)
COLOR UR: YELLOW
CREAT SERPL-MCNC: 0.9 MG/DL (ref 0.5–1.4)
DIFFERENTIAL METHOD BLD: ABNORMAL
EOSINOPHIL # BLD AUTO: 0.1 K/UL (ref 0–0.5)
EOSINOPHIL NFR BLD: 1.9 % (ref 0–8)
ERYTHROCYTE [DISTWIDTH] IN BLOOD BY AUTOMATED COUNT: 14.5 % (ref 11.5–14.5)
EST. GFR  (NO RACE VARIABLE): >60 ML/MIN/1.73 M^2
GLUCOSE SERPL-MCNC: 141 MG/DL (ref 70–110)
GLUCOSE UR QL STRIP: ABNORMAL
HCT VFR BLD AUTO: 40.4 % (ref 40–54)
HGB BLD-MCNC: 12.7 G/DL (ref 14–18)
HGB UR QL STRIP: NEGATIVE
HYALINE CASTS #/AREA URNS LPF: 1 /LPF
IMM GRANULOCYTES # BLD AUTO: 0.01 K/UL (ref 0–0.04)
IMM GRANULOCYTES NFR BLD AUTO: 0.1 % (ref 0–0.5)
KETONES UR QL STRIP: NEGATIVE
LEUKOCYTE ESTERASE UR QL STRIP: NEGATIVE
LYMPHOCYTES # BLD AUTO: 2.4 K/UL (ref 1–4.8)
LYMPHOCYTES NFR BLD: 34.5 % (ref 18–48)
MCH RBC QN AUTO: 26 PG (ref 27–31)
MCHC RBC AUTO-ENTMCNC: 31.4 G/DL (ref 32–36)
MCV RBC AUTO: 83 FL (ref 82–98)
MICROSCOPIC COMMENT: NORMAL
MONOCYTES # BLD AUTO: 0.9 K/UL (ref 0.3–1)
MONOCYTES NFR BLD: 12.7 % (ref 4–15)
NEUTROPHILS # BLD AUTO: 3.5 K/UL (ref 1.8–7.7)
NEUTROPHILS NFR BLD: 50.5 % (ref 38–73)
NITRITE UR QL STRIP: NEGATIVE
NRBC BLD-RTO: 0 /100 WBC
PH UR STRIP: 6 [PH] (ref 5–8)
PLATELET # BLD AUTO: 275 K/UL (ref 150–450)
PMV BLD AUTO: 10.2 FL (ref 9.2–12.9)
POTASSIUM SERPL-SCNC: 3.7 MMOL/L (ref 3.5–5.1)
PROT SERPL-MCNC: 7.3 G/DL (ref 6–8.4)
PROT UR QL STRIP: ABNORMAL
RBC # BLD AUTO: 4.89 M/UL (ref 4.6–6.2)
RBC #/AREA URNS HPF: 1 /HPF (ref 0–4)
SODIUM SERPL-SCNC: 140 MMOL/L (ref 136–145)
SP GR UR STRIP: 1.02 (ref 1–1.03)
TROPONIN I SERPL DL<=0.01 NG/ML-MCNC: 0.01 NG/ML (ref 0–0.03)
TROPONIN I SERPL DL<=0.01 NG/ML-MCNC: <0.006 NG/ML (ref 0–0.03)
URN SPEC COLLECT METH UR: ABNORMAL
UROBILINOGEN UR STRIP-ACNC: NEGATIVE EU/DL
WBC # BLD AUTO: 6.95 K/UL (ref 3.9–12.7)
WBC #/AREA URNS HPF: 0 /HPF (ref 0–5)
YEAST URNS QL MICRO: NORMAL

## 2024-09-14 PROCEDURE — 93005 ELECTROCARDIOGRAM TRACING: CPT | Mod: HCNC

## 2024-09-14 PROCEDURE — 80053 COMPREHEN METABOLIC PANEL: CPT | Mod: HCNC | Performed by: FAMILY MEDICINE

## 2024-09-14 PROCEDURE — 99285 EMERGENCY DEPT VISIT HI MDM: CPT | Mod: 25,HCNC

## 2024-09-14 PROCEDURE — 81000 URINALYSIS NONAUTO W/SCOPE: CPT | Mod: HCNC | Performed by: FAMILY MEDICINE

## 2024-09-14 PROCEDURE — 83880 ASSAY OF NATRIURETIC PEPTIDE: CPT | Mod: HCNC | Performed by: FAMILY MEDICINE

## 2024-09-14 PROCEDURE — 25000242 PHARM REV CODE 250 ALT 637 W/ HCPCS: Mod: HCNC | Performed by: FAMILY MEDICINE

## 2024-09-14 PROCEDURE — 85025 COMPLETE CBC W/AUTO DIFF WBC: CPT | Mod: HCNC | Performed by: FAMILY MEDICINE

## 2024-09-14 PROCEDURE — 84484 ASSAY OF TROPONIN QUANT: CPT | Mod: HCNC | Performed by: FAMILY MEDICINE

## 2024-09-14 PROCEDURE — 93010 ELECTROCARDIOGRAM REPORT: CPT | Mod: HCNC,,, | Performed by: INTERNAL MEDICINE

## 2024-09-14 RX ORDER — NITROGLYCERIN 0.4 MG/1
0.4 TABLET SUBLINGUAL EVERY 5 MIN PRN
Qty: 100 TABLET | Refills: 0 | Status: SHIPPED | OUTPATIENT
Start: 2024-09-14 | End: 2025-09-14

## 2024-09-14 RX ORDER — NITROGLYCERIN 0.4 MG/1
0.4 TABLET SUBLINGUAL
Status: COMPLETED | OUTPATIENT
Start: 2024-09-14 | End: 2024-09-14

## 2024-09-14 RX ADMIN — NITROGLYCERIN 0.4 MG: 0.4 TABLET SUBLINGUAL at 03:09

## 2024-09-14 NOTE — ED PROVIDER NOTES
SCRIBE #1 NOTE: I, Yonatan Blanco, am scribing for, and in the presence of, Hilary Dunn MD. I have scribed the entire note.       History     Chief Complaint   Patient presents with    chest wall pain     Right sided chest pain started this am, coming and going, describes as an ache , denies sob      Review of patient's allergies indicates:   Allergen Reactions    Tomato          History of Present Illness     HPI    9/14/2024, 3:17 PM  History obtained from the patient      History of Present Illness: Tramaine Resendiz is a 68 y.o. male patient with a PMHx of DM Type 2 and HTN who presents to the Emergency Department for evaluation of right sided CP which onset gradually since last night. Pt denies any recent fall or trauma. Pt states he was not doing any physical activity when the CP started. Pt had a stent placed in his heart after a MI back in February. Symptoms are intermittent and moderate in severity. No mitigating or exacerbating factors reported. No associated sxs reported. Patient denies any nausea, vomiting, diaphoresis, SOB, HA, and all other sxs at this time. Prior Tx includes taking nitroglycerin last night with relief. No further complaints or concerns at this time.       Arrival mode: Personal vehicle      PCP: Jeane Doyle MD        Past Medical History:  Past Medical History:   Diagnosis Date    Heavy drinker     cut back around 2018/2019    Hypertension     Type 2 diabetes mellitus without complications 2012    BS didn't check 11/28/2022       Past Surgical History:  Past Surgical History:   Procedure Laterality Date    CLOSURE DEVICE  2/13/2024    Procedure: Placement of Closure Device;  Surgeon: Matthew Orellana MD;  Location: [facility] CATH LAB;  Service: Cardiology;;    HERNIA REPAIR      IVUS, CORONARY  2/13/2024    Procedure: IVUS, Coronary;  Surgeon: Matthew Orellana MD;  Location: [facility] CATH LAB;  Service: Cardiology;;    LEFT HEART CATHETERIZATION Left 2/13/2024    Procedure: Left heart  cath;  Surgeon: Matthew Orellana MD;  Location: [facility] CATH LAB;  Service: Cardiology;  Laterality: Left;    PERCUTANEOUS CORONARY INTERVENTION (PCI) FOR CHRONIC TOTAL OCCLUSION OF CORONARY ARTERY  2/13/2024    Procedure: PCI, FOR CHRONIC TOTAL CORONARY ARTERY OCCLUSION;  Surgeon: Matthew Orellana MD;  Location: [facility] CATH LAB;  Service: Cardiology;;    PERCUTANEOUS CORONARY INTERVENTION, ARTERY N/A 2/13/2024    Procedure: Percutaneous coronary intervention;  Surgeon: Matthew Orellana MD;  Location: [facility] CATH LAB;  Service: Cardiology;  Laterality: N/A;    PLACEMENT, IABP  2/13/2024    Procedure: Placement, IABP;  Surgeon: Matthew Orellana MD;  Location: [facility] CATH LAB;  Service: Cardiology;;    STENT, DRUG ELUTING, MULTI VESSEL, CORONARY  2/13/2024    Procedure: Stent, Drug Eluting, Multi Vessel, Coronary;  Surgeon: Matthew Orellana MD;  Location: [facility] CATH LAB;  Service: Cardiology;;    THROMBECTOMY, CORONARY  2/13/2024    Procedure: Thrombectomy, Coronary;  Surgeon: Matthew Orellana MD;  Location: [facility] CATH LAB;  Service: Cardiology;;         Family History:  Family History   Problem Relation Name Age of Onset    Hypertension Mother      Diabetes Mother         Social History:  Social History     Tobacco Use    Smoking status: Former     Types: Cigarettes    Smokeless tobacco: Never   Substance and Sexual Activity    Alcohol use: Not Currently    Drug use: Never    Sexual activity: Not Currently        Review of Systems     Review of Systems   Constitutional:  Negative for chills, diaphoresis and fever.   HENT:  Negative for sore throat.    Respiratory:  Negative for cough and shortness of breath.    Cardiovascular:  Positive for chest pain.   Gastrointestinal:  Negative for nausea and vomiting.   Genitourinary:  Negative for dysuria.   Musculoskeletal:  Negative for back pain.   Skin:  Negative for rash.   Neurological:  Negative for weakness and headaches.   Hematological:  Does not bruise/bleed easily.   All other  "systems reviewed and are negative.     Physical Exam     Initial Vitals [09/14/24 1238]   BP Pulse Resp Temp SpO2   (!) 183/108 87 18 98 °F (36.7 °C) 98 %      MAP       --          Physical Exam  Nursing Notes and Vital Signs Reviewed.  Constitutional: Patient is in no acute distress. Well-developed and well-nourished.  Head: Atraumatic. Normocephalic.  Eyes: PERRL. EOM intact. Conjunctivae are not pale. No scleral icterus.  ENT: Mucous membranes are moist. Oropharynx is clear and symmetric.    Neck: Supple. Full ROM. No lymphadenopathy.  Cardiovascular: Regular rate. Regular rhythm. No murmurs, rubs, or gallops. Distal pulses are 2+ and symmetric.  Pulmonary/Chest: No respiratory distress. Clear to auscultation bilaterally. No wheezing or rales.  Abdominal: Soft and non-distended.  There is no tenderness.  No rebound, guarding, or rigidity. Good bowel sounds.  Genitourinary: No CVA tenderness  Musculoskeletal: Moves all extremities. No obvious deformities. No edema. No calf tenderness.  Skin: Warm and dry.  Neurological:  Alert, awake, and appropriate.  Normal speech.  No acute focal neurological deficits are appreciated.  Psychiatric: Normal affect. Good eye contact. Appropriate in content.     ED Course   Procedures  ED Vital Signs:  Vitals:    09/14/24 1238 09/14/24 1703   BP: (!) 183/108 (!) 168/79   Pulse: 87 66   Resp: 18 17   Temp: 98 °F (36.7 °C) 98.1 °F (36.7 °C)   TempSrc: Oral Oral   SpO2: 98% 99%   Weight: 83.4 kg (183 lb 13.8 oz)    Height: 5' 10" (1.778 m)        Abnormal Lab Results:  Labs Reviewed   CBC W/ AUTO DIFFERENTIAL - Abnormal       Result Value    WBC 6.95      RBC 4.89      Hemoglobin 12.7 (*)     Hematocrit 40.4      MCV 83      MCH 26.0 (*)     MCHC 31.4 (*)     RDW 14.5      Platelets 275      MPV 10.2      Immature Granulocytes 0.1      Gran # (ANC) 3.5      Immature Grans (Abs) 0.01      Lymph # 2.4      Mono # 0.9      Eos # 0.1      Baso # 0.02      nRBC 0      Gran % 50.5      " Lymph % 34.5      Mono % 12.7      Eosinophil % 1.9      Basophil % 0.3      Differential Method Automated     COMPREHENSIVE METABOLIC PANEL - Abnormal    Sodium 140      Potassium 3.7      Chloride 110      CO2 22 (*)     Glucose 141 (*)     BUN 10      Creatinine 0.9      Calcium 9.5      Total Protein 7.3      Albumin 3.8      Total Bilirubin 0.3      Alkaline Phosphatase 95      AST 17      ALT 22      eGFR >60      Anion Gap 8     URINALYSIS - Abnormal    Specimen UA Urine, Clean Catch      Color, UA Yellow      Appearance, UA Clear      pH, UA 6.0      Specific Gravity, UA 1.025      Protein, UA 1+ (*)     Glucose, UA 4+ (*)     Ketones, UA Negative      Bilirubin (UA) Negative      Occult Blood UA Negative      Nitrite, UA Negative      Urobilinogen, UA Negative      Leukocytes, UA Negative     B-TYPE NATRIURETIC PEPTIDE    BNP 83     TROPONIN I    Troponin I <0.006     URINALYSIS MICROSCOPIC    RBC, UA 1      WBC, UA 0      Bacteria None      Yeast, UA None      Hyaline Casts, UA 1      Microscopic Comment SEE COMMENT     TROPONIN I    Troponin I 0.013          All Lab Results:  Results for orders placed or performed during the hospital encounter of 09/14/24   CBC auto differential   Result Value Ref Range    WBC 6.95 3.90 - 12.70 K/uL    RBC 4.89 4.60 - 6.20 M/uL    Hemoglobin 12.7 (L) 14.0 - 18.0 g/dL    Hematocrit 40.4 40.0 - 54.0 %    MCV 83 82 - 98 fL    MCH 26.0 (L) 27.0 - 31.0 pg    MCHC 31.4 (L) 32.0 - 36.0 g/dL    RDW 14.5 11.5 - 14.5 %    Platelets 275 150 - 450 K/uL    MPV 10.2 9.2 - 12.9 fL    Immature Granulocytes 0.1 0.0 - 0.5 %    Gran # (ANC) 3.5 1.8 - 7.7 K/uL    Immature Grans (Abs) 0.01 0.00 - 0.04 K/uL    Lymph # 2.4 1.0 - 4.8 K/uL    Mono # 0.9 0.3 - 1.0 K/uL    Eos # 0.1 0.0 - 0.5 K/uL    Baso # 0.02 0.00 - 0.20 K/uL    nRBC 0 0 /100 WBC    Gran % 50.5 38.0 - 73.0 %    Lymph % 34.5 18.0 - 48.0 %    Mono % 12.7 4.0 - 15.0 %    Eosinophil % 1.9 0.0 - 8.0 %    Basophil % 0.3 0.0 - 1.9 %     Differential Method Automated    Comprehensive metabolic panel   Result Value Ref Range    Sodium 140 136 - 145 mmol/L    Potassium 3.7 3.5 - 5.1 mmol/L    Chloride 110 95 - 110 mmol/L    CO2 22 (L) 23 - 29 mmol/L    Glucose 141 (H) 70 - 110 mg/dL    BUN 10 8 - 23 mg/dL    Creatinine 0.9 0.5 - 1.4 mg/dL    Calcium 9.5 8.7 - 10.5 mg/dL    Total Protein 7.3 6.0 - 8.4 g/dL    Albumin 3.8 3.5 - 5.2 g/dL    Total Bilirubin 0.3 0.1 - 1.0 mg/dL    Alkaline Phosphatase 95 55 - 135 U/L    AST 17 10 - 40 U/L    ALT 22 10 - 44 U/L    eGFR >60 >60 mL/min/1.73 m^2    Anion Gap 8 8 - 16 mmol/L   Urinalysis - Clean Catch   Result Value Ref Range    Specimen UA Urine, Clean Catch     Color, UA Yellow Yellow, Straw, Tracie    Appearance, UA Clear Clear    pH, UA 6.0 5.0 - 8.0    Specific Gravity, UA 1.025 1.005 - 1.030    Protein, UA 1+ (A) Negative    Glucose, UA 4+ (A) Negative    Ketones, UA Negative Negative    Bilirubin (UA) Negative Negative    Occult Blood UA Negative Negative    Nitrite, UA Negative Negative    Urobilinogen, UA Negative <2.0 EU/dL    Leukocytes, UA Negative Negative   B-Type natriuretic peptide (BNP)   Result Value Ref Range    BNP 83 0 - 99 pg/mL   Troponin I   Result Value Ref Range    Troponin I <0.006 0.000 - 0.026 ng/mL   Urinalysis Microscopic   Result Value Ref Range    RBC, UA 1 0 - 4 /hpf    WBC, UA 0 0 - 5 /hpf    Bacteria None None-Occ /hpf    Yeast, UA None None    Hyaline Casts, UA 1 0-1/lpf /lpf    Microscopic Comment SEE COMMENT    Troponin I   Result Value Ref Range    Troponin I 0.013 0.000 - 0.026 ng/mL   EKG 12-lead   Result Value Ref Range    QRS Duration 92 ms    [facility] QTC Calculation 441 ms         Imaging Results:  Imaging Results              X-Ray Chest AP Portable (Final result)  Result time 09/14/24 13:53:18      Final result by Perry Cuevas MD (09/14/24 13:53:18)                   Impression:      1.  Negative for acute process involving the chest.    2.  Stable findings as noted  above.      Electronically signed by: Perry Cuevas MD  Date:    09/14/2024  Time:    13:53               Narrative:    EXAMINATION:  XR CHEST AP PORTABLE    CLINICAL HISTORY:  Chest Pain;    COMPARISON:  Studies dating back to January 8, 2019    FINDINGS:  EKG leads overlie the chest, which is lordotic in position and rotated to the left.  The lungs are clear. The cardiac silhouette size is normal. The trachea is midline and the mediastinal width is normal. Negative for focal infiltrate, effusion or pneumothorax. Pulmonary vasculature is normal. Negative for osseous abnormalities. Tortuous aorta with calcifications of the aortic knob.  There are degenerative changes of the spine and both shoulder girdles.  Convex right curvature of the thoracolumbar junction.  Cardiophrenic fat pads.                                       The EKG was ordered, reviewed, and independently interpreted by the ED provider.  Interpretation time: 12:42  Rate: 82 BPM  Rhythm: normal sinus rhythm  Interpretation: Septal infarct (cited on or before 14-SEP-2024). No STEMI.           The Emergency Provider reviewed the vital signs and test results, which are outlined above.     ED Discussion     4:58 PM: Reassessed pt at this time. Discussed with pt all pertinent ED information and results. Discussed pt dx and plan of tx. Gave pt all f/u and return to the ED instructions. All questions and concerns were addressed at this time. Pt expresses understanding of information and instructions, and is comfortable with plan to discharge. Pt is stable for discharge.    I discussed with patient and/or family/caretaker that evaluation in the ED does not suggest any emergent or life threatening medical conditions requiring immediate intervention beyond what was provided in the ED, and I believe patient is safe for discharge.  Regardless, an unremarkable evaluation in the ED does not preclude the development or presence of a serious of life threatening  condition. As such, patient was instructed to return immediately for any worsening or change in current symptoms.         Medical Decision Making  67 yo male c/o chest pain that started last night, he took his last NTG SL and pain improved.  Then today pain to right chest recurred.  He had a coronary stent placed in February.  He says he had right sided chest pain with his heart attack.  He denies SOB, n/v or diaphoresis.  Initial blood pressure 183/108.  1st troponin .006, 2nd troponin 0.012.  0.4 mg NTG SL given and chest pain resolved.  Blood pressure has improved 143/79.  Patient will follow up with cardiology    Amount and/or Complexity of Data Reviewed  Independent Historian: spouse     Details: Wife at bedside  External Data Reviewed: labs, radiology, ECG and notes.  Labs: ordered. Decision-making details documented in ED Course.  Radiology: ordered and independent interpretation performed. Decision-making details documented in ED Course.  ECG/medicine tests: ordered and independent interpretation performed. Decision-making details documented in ED Course.    Risk  OTC drugs.  Prescription drug management.                ED Medication(s):  Medications   nitroGLYCERIN SL tablet 0.4 mg (0.4 mg Sublingual Given 9/14/24 1517)       Discharge Medication List as of 9/14/2024  4:51 PM        START taking these medications    Details   !! nitroGLYCERIN (NITROSTAT) 0.4 MG SL tablet Place 1 tablet (0.4 mg total) under the tongue every 5 (five) minutes as needed for Chest pain., Starting Sat 9/14/2024, Until Sun 9/14/2025 at 2359, Print       !! - Potential duplicate medications found. Please discuss with provider.           Follow-up Information       Schedule an appointment as soon as possible for a visit  with Jeane Doyle MD.    Specialty: Family Medicine  Why: As needed  Contact information:  [id number] [facility] [address] [phone]                                 Scribe Attestation:    Scribe #1: I performed the above scribed service and the documentation accurately describes the services I performed. I attest to the accuracy of the note.     Attending:   Physician Attestation Statement for Scribe #1: I, Hilary Dunn MD, personally performed the services described in this documentation, as scribed by Yonatan Blanco, in my presence, and it is both accurate and complete.           Clinical Impression       ICD-10-CM ICD-9-CM   1. Angina at rest  I20.89 413.9   2. Chest pain  R07.9 786.50   3. Primary hypertension  I10 401.9       Disposition:   Disposition: Discharged  Condition: Stable         Hilary Dunn MD  09/17/24 1330

## 2024-09-15 LAB
OHS QRS DURATION: 92 MS
OHS QTC CALCULATION: 441 MS

## 2024-09-16 ENCOUNTER — PATIENT OUTREACH (OUTPATIENT)
Dept: EMERGENCY MEDICINE | Facility: HOSPITAL | Age: 69
End: 2024-09-16
Payer: MEDICARE

## 2024-09-17 NOTE — PROGRESS NOTES
Pt visited the ED on 9/14/24. I made 2 attempts to reach patient to assist with scheduling a post ED 7-day follow up with PCP. Unable to reach. Closing encounter.    Rosa Dominguez

## 2024-09-19 ENCOUNTER — LAB VISIT (OUTPATIENT)
Dept: LAB | Facility: HOSPITAL | Age: 69
End: 2024-09-19
Attending: UROLOGY
Payer: MEDICARE

## 2024-09-19 ENCOUNTER — OFFICE VISIT (OUTPATIENT)
Dept: UROLOGY | Facility: CLINIC | Age: 69
End: 2024-09-19
Payer: MEDICARE

## 2024-09-19 VITALS
BODY MASS INDEX: 26.33 KG/M2 | HEART RATE: 69 BPM | HEIGHT: 70 IN | DIASTOLIC BLOOD PRESSURE: 92 MMHG | WEIGHT: 183.88 LBS | TEMPERATURE: 98 F | SYSTOLIC BLOOD PRESSURE: 186 MMHG | RESPIRATION RATE: 16 BRPM

## 2024-09-19 DIAGNOSIS — R97.20 ELEVATED PSA: Primary | ICD-10-CM

## 2024-09-19 DIAGNOSIS — R97.20 ELEVATED PSA: ICD-10-CM

## 2024-09-19 LAB — COMPLEXED PSA SERPL-MCNC: 5.6 NG/ML (ref 0–4)

## 2024-09-19 PROCEDURE — 99999 PR PBB SHADOW E&M-EST. PATIENT-LVL IV: CPT | Mod: PBBFAC,HCNC,, | Performed by: UROLOGY

## 2024-09-19 PROCEDURE — 84153 ASSAY OF PSA TOTAL: CPT | Mod: HCNC | Performed by: UROLOGY

## 2024-09-19 PROCEDURE — 36415 COLL VENOUS BLD VENIPUNCTURE: CPT | Mod: HCNC | Performed by: UROLOGY

## 2024-09-19 NOTE — PROGRESS NOTES
Chief Complaint:   Elevated PSA    HPI:   09/19/2024 - returns today for follow-up, no new issues in the interim, did not receive the Cialis as it was too expensive, no changes to his voiding, no gross hematuria or dysuria    03/14/2024 -  returns today for follow-up  and review his prostate biopsy results, this shows benign prostate tissue, no cancer or premalignant lesions found, wants to try something more for his ED, no gross hematuria or dysuria    09/28/2023 - patient returns today for follow-up, unfortunately he lost his mom the beginning of this month, still getting over it, no new voiding issues, Viagra not very helpful for his ED, wants to try something else, no gross hematuria or dysuria    03/16/2023 - patient returns today for follow-up and review of his MRI, this shows a 45 g prostate without suspicious lesions, patient also notes issues with ED, has never tried anything for his ED, does not take any nitrates, voiding well no gross hematuria, notes that his 93 yo mom has dementia and just recently broke her hip so he is busy taking care of her    Patient is a 67-year-old male that is presenting with an elevated PSA, 5.5.  Patient denies gross hematuria, no BPH meds.  Denies nocturia and stream is strong during the day.  Urine in clinic is negative and PVR was 45 mL.  No prior prostate biopsies.  No  cancers in his family.No urolithiasis.  No urinary bother.    Allergies:  Tomato    Medications:  has a current medication list which includes the following prescription(s): aspirin, blood sugar diagnostic, empagliflozin, lantus solostar u-100 insulin, lancets, metoprolol tartrate, paxlovid, nitroglycerin, nitroglycerin, ondansetron, pen needle, diabetic, pravastatin, ticagrelor, and blood-glucose meter.    Review of Systems:  General: No fever, chills  Skin: No rashes  Chest:  Denies cough and sputum production  Heart: Denies chest pain  Resp: Denies dyspnea  Abdomen: Denies diarrhea, abdominal pain,  hematemesis, or blood in stool.  Musculoskeletal: No joint stiffness or swelling. Denies back pain.  : see HPI  Neuro: no dizziness or weakness      PMH:   has a past medical history of Heavy drinker, Hypertension, and Type 2 diabetes mellitus without complications (2012).    PSH:   has a past surgical history that includes Hernia repair; Left heart catheterization (Left, 2/13/2024); thrombectomy, coronary (2/13/2024); percutaneous coronary intervention, artery (N/A, 2/13/2024); stent, drug eluting, multi vessel, coronary (2/13/2024); Percutaneous coronary intervention (PCI) for chronic total occlusion of coronary artery (2/13/2024); ivus, coronary (2/13/2024); placement, iabp (2/13/2024); and closure device (2/13/2024).    FamHx: family history includes Diabetes in his mother; Hypertension in his mother.    SocHx:  reports that he has quit smoking. His smoking use included cigarettes. He has never used smokeless tobacco. He reports that he does not currently use alcohol. He reports that he does not use drugs.      Physical Exam:  General: A&Ox3, no apparent distress, no deformities  Neck: No masses, normal thyroid  Lungs: normal inspiration, no use of accessory muscles  Heart: normal pulse, no arrhythmias  Abdomen: Soft, NT, ND, no masses, no hernias, no hepatosplenomegaly  Lymphatic: Neck and groin nodes negative  Skin: The skin is warm and dry. No jaundice.  Ext: No c/c/e.  : Test desc ap, no abnormalities of epididymus. Penis uncircumcised, with normal penile and scrotal skin. Meatus normal. Normal rectal tone, no hemorrhoids. Prost 40 gm no nodules or masses appreciated. SV not palpable. Perineum and anus normal.    MRI PROSTATE W W/O CONTRAST 01/17/2023     CLINICAL HISTORY:  prostate cancer;  Elevated prostate specific antigen (PSA)     TECHNIQUE:  Multiparametric MR imaging of the prostate gland is performed with and without the intravenous administration of 10 cc of Gadavist.     3D reconstructions: 3D  reconstructions were ordered by the urologist to generate a 3D model of the prostate gland for subsequent target lesion mapping as needed for direct or fusion prostate biopsy.  I, the interpreting radiologist, performed the 3D reconstructions on Sold, an independent workstation, with reports and key images saved to PACS.     COMPARISON:  None     FINDINGS:  The prostate gland measures 5.8 x 3.9 x 4.0 cm for total volume of 45.72 cc.  The T1 sequence shows no intra prostatic hemorrhage.     The transitional zone contains hyperplastic nodules.  No suspicious transitional zone lesion.     The peripheral zone shows no diffusion restriction or suspicious T2 hypointense nodule.     Prostate capsule intact.  Rectoprostatic angle preserved and the neurovascular bundles have a normal appearance.  Normal seminal vesicles.  No pelvic lymphadenopathy.  No focal bone marrow replacing lesion in the bony pelvis.     Impression:     1. No focal suspicious lesion in the prostate gland to localize a clinically significant prostate cancer.  2. Total prostate volume 45.72 cc.  3. No pelvic lymphadenopathy.  PIRADS 2 - Low (clinically significant cancer unlikely to be present. )    Labs/Studies:    Latest Reference Range & Units 09/26/22 10:29   PSA, Screen 0.00 - 4.00 ng/mL 5.5 (H)   (H): Data is abnormally high    Impression/Plan:   Elevated PSA - negative MRI and biopsy, PSA today, f/u 6 months with PSA    Howard Baum MD

## 2024-10-01 ENCOUNTER — PATIENT OUTREACH (OUTPATIENT)
Dept: ADMINISTRATIVE | Facility: HOSPITAL | Age: 69
End: 2024-10-01
Payer: MEDICARE

## 2024-10-02 DIAGNOSIS — E11.9 TYPE 2 DIABETES MELLITUS WITHOUT COMPLICATION: ICD-10-CM

## 2024-10-18 ENCOUNTER — OFFICE VISIT (OUTPATIENT)
Dept: DIABETES | Facility: CLINIC | Age: 69
End: 2024-10-18
Payer: MEDICARE

## 2024-10-18 VITALS
HEART RATE: 93 BPM | BODY MASS INDEX: 26.79 KG/M2 | WEIGHT: 186.75 LBS | DIASTOLIC BLOOD PRESSURE: 82 MMHG | SYSTOLIC BLOOD PRESSURE: 138 MMHG

## 2024-10-18 DIAGNOSIS — E11.22 TYPE 2 DIABETES MELLITUS WITH CHRONIC KIDNEY DISEASE, WITH LONG-TERM CURRENT USE OF INSULIN, UNSPECIFIED CKD STAGE: Primary | ICD-10-CM

## 2024-10-18 DIAGNOSIS — E11.69 HYPERLIPIDEMIA ASSOCIATED WITH TYPE 2 DIABETES MELLITUS: ICD-10-CM

## 2024-10-18 DIAGNOSIS — E11.65 TYPE 2 DIABETES MELLITUS WITH HYPERGLYCEMIA, WITH LONG-TERM CURRENT USE OF INSULIN: ICD-10-CM

## 2024-10-18 DIAGNOSIS — Z79.4 TYPE 2 DIABETES MELLITUS WITH HYPERGLYCEMIA, WITH LONG-TERM CURRENT USE OF INSULIN: ICD-10-CM

## 2024-10-18 DIAGNOSIS — I10 PRIMARY HYPERTENSION: ICD-10-CM

## 2024-10-18 DIAGNOSIS — E78.5 HYPERLIPIDEMIA ASSOCIATED WITH TYPE 2 DIABETES MELLITUS: ICD-10-CM

## 2024-10-18 DIAGNOSIS — I25.10 CORONARY ARTERY DISEASE INVOLVING NATIVE CORONARY ARTERY OF NATIVE HEART WITHOUT ANGINA PECTORIS: ICD-10-CM

## 2024-10-18 DIAGNOSIS — Z79.4 TYPE 2 DIABETES MELLITUS WITH CHRONIC KIDNEY DISEASE, WITH LONG-TERM CURRENT USE OF INSULIN, UNSPECIFIED CKD STAGE: Primary | ICD-10-CM

## 2024-10-18 DIAGNOSIS — E11.65 UNCONTROLLED TYPE 2 DIABETES MELLITUS WITH HYPERGLYCEMIA: ICD-10-CM

## 2024-10-18 LAB — GLUCOSE SERPL-MCNC: 220 MG/DL (ref 70–110)

## 2024-10-18 PROCEDURE — 99999 PR PBB SHADOW E&M-EST. PATIENT-LVL IV: CPT | Mod: PBBFAC,HCNC,, | Performed by: NURSE PRACTITIONER

## 2024-10-18 NOTE — PROGRESS NOTES
"    Patient ID: Tramaine Resendiz is a 69 y.o. male.  Patient's current PCP is Jeane Doyle MD.     Chief Complaint: Diabetes Mellitus    HPI  Tramaine Resendiz is a 69 y.o. Black or  male presenting for a follow up for diabetes. Patient has been diagnosed with type 2 diabetes for > 10 years.    Recent diabetes related hospitalizations: MI 2/12/24, 9/14/24 ED visit for CP- workup negative  Pertinent to decision making is/are the following comorbidities:  Htn  Hln  Cad    Complications related to diabetes: nephropathy and cardiovascular disease  Previous diabetes education: no, declines  Occupation:Retired [employer]/,     Current diet:  Eating 3 meals per day. Improving discretion. Weight up 5#  Activity level: Reports being more active daily in yard/around the house - Denies CP and CASTRO    Changes made at last visit:        - poor control in setting of poor health literacy  -     Name and phone number of patient assistance department contact given as well as medications needed given to patient to follow up with. Patient needs to provide information in order to receive medications.  -     Stressed with patient need to take medications as prescribed EVEN IF not feeling bad at this time to prevent complications including MI,CVA and even death.   -     Reviewed and reinforced diet recommendations and encouraged to decrease alcohol intake    Current issues: Satisfied with paying $35/month for insulin. Still needs follow up with PA for jardiance. States feeling "much better" since starting insulin      CURRENT DM MEDICATIONS:   Diabetes Medications               insulin glargine U-100, Lantus, (LANTUS SOLOSTAR U-100 INSULIN) 100 unit/mL (3 mL) InPn pen Inject 20 Units into the skin every evening.    empagliflozin (JARDIANCE) 25 mg tablet Take 1 tablet (25 mg total) by mouth once daily  Not taking due to cost           Past failed treatment(s) include:   Glyburide  Ozempic - too " "expensive    His blood sugar in the clinic today was: NO insulin yesterday or today  Lab Results   Component Value Date    POCGLU 220 (A) 10/18/2024       Meter/cgm: -  Blood glucose testing is not performed.   Patient is testing 0 times per day.  Any episodes of hypoglycemia? Denies   Glucose trends: unknown      Diabetes Management Status    Statin: Taking  ACE/ARB: Not taking    Screening or Prevention Patient's value Goal Complete/Controlled?   HgA1C Testing and Control   Lab Results   Component Value Date    HGBA1C 10.6 (H) 06/28/2024      Annually/Less than 8% No   Lipid profile : 02/12/2024 Annually Yes   LDL control Lab Results   Component Value Date    LDLCALC 99.2 02/12/2024    Annually/Less than 100 mg/dl  Yes   Nephropathy screening Lab Results   Component Value Date    LABMICR 310.0 03/21/2024     Lab Results   Component Value Date    PROTEINUA 1+ (A) 09/14/2024     No results found for: "UTPCR"   Annually Yes   Blood pressure BP Readings from Last 1 Encounters:   10/18/24 138/82    Less than 140/90 Yes   Dilated retinal exam : 07/25/2024 Annually Yes   Foot exam   : 01/25/2024 and 8/7/24 Annually Yes       Preferred lab site: [facility]  Preferred visit site: [facility]    Wt Readings from Last 3 Encounters:   10/18/24 0809 84.7 kg (186 lb 11.7 oz)   09/19/24 0919 83.4 kg (183 lb 13.8 oz)   09/14/24 1238 83.4 kg (183 lb 13.8 oz)     Labs reviewed and are noted below.    Lab Results   Component Value Date    HGBA1C 10.6 (H) 06/28/2024    HGBA1C 10.9 (H) 03/21/2024    HGBA1C 11.7 (H) 02/12/2024     Lab Results   Component Value Date    WBC 6.95 09/14/2024    HGB 12.7 (L) 09/14/2024    HCT 40.4 09/14/2024     09/14/2024    CHOL 154 02/12/2024    TRIG 59 02/12/2024    HDL 43 02/12/2024    LDLCALC 99.2 02/12/2024    ALT 22 09/14/2024    AST 17 09/14/2024     09/14/2024    K 3.7 09/14/2024     09/14/2024    ANIONGAP 8 09/14/2024    CREATININE 0.9 09/14/2024    ESTGFRAFRICA >60 01/08/2019    " "EGFRNONAA >60 01/08/2019    BUN 10 09/14/2024    CO2 22 (L) 09/14/2024    PSA 5.5 (H) 09/26/2022    INR 1.0 02/12/2024     (H) 09/14/2024     Lab Results   Component Value Date    ROLPAWHW79 822 10/04/2023    CALCIUM 9.5 09/14/2024     No results found for: "CPEPTIDE"  No results found for: "GLUTAMICACID"  Glucose   Date Value Ref Range Status   09/14/2024 141 (H) 70 - 110 mg/dL Final     Anion Gap   Date Value Ref Range Status   09/14/2024 8 8 - 16 mmol/L Final     eGFR if    Date Value Ref Range Status   01/08/2019 >60 >60 mL/min/1.73 m^2 Final     eGFR if non    Date Value Ref Range Status   01/08/2019 >60 >60 mL/min/1.73 m^2 Final     Comment:     Calculation used to obtain the estimated glomerular filtration  rate (eGFR) is the CKD-EPI equation.              Review of patient's allergies indicates:   Allergen Reactions    Tomato      Social History     Socioeconomic History    Marital status:    Tobacco Use    Smoking status: Former     Types: Cigarettes    Smokeless tobacco: Never   Substance and Sexual Activity    Alcohol use: Not Currently    Drug use: Never    Sexual activity: Not Currently     Social Drivers of Health     Financial Resource Strain: High Risk (7/31/2024)    Overall Financial Resource Strain (CARDIA)     Difficulty of Paying Living Expenses: Hard   Food Insecurity: Food Insecurity Present (7/31/2024)    Hunger Vital Sign     Worried About Running Out of Food in the Last Year: Often true     Ran Out of Food in the Last Year: Never true   Transportation Needs: No Transportation Needs (7/31/2024)    PRAPARE - Transportation     Lack of Transportation (Medical): No     Lack of Transportation (Non-Medical): No   Physical Activity: Insufficiently Active (7/31/2024)    Exercise Vital Sign     Days of Exercise per Week: 3 days     Minutes of Exercise per Session: 30 min   Stress: No Stress Concern Present (7/31/2024)    [facility]ational " Health - Occupational Stress Questionnaire     Feeling of Stress : Not at all   Housing Stability: Low Risk  (7/31/2024)    Housing Stability Vital Sign     Unable to Pay for Housing in the Last Year: No     Homeless in the Last Year: No     Past Medical History:   Diagnosis Date    Heavy drinker     cut back around 2018/2019    Hypertension     Type 2 diabetes mellitus without complications 2012    BS didn't check 11/28/2022       Review of Systems   Constitutional:  Negative for malaise/fatigue and weight loss.   Eyes:  Negative for blurred vision and double vision.   Respiratory:  Negative for shortness of breath.    Cardiovascular: Negative.  Negative for chest pain, claudication and leg swelling.   Gastrointestinal: Negative.    Genitourinary:  Negative for frequency.   Musculoskeletal:  Negative for back pain, falls and myalgias.   Neurological: Negative.    Psychiatric/Behavioral: Negative.           Physical Exam  Vitals reviewed.   Constitutional:       General: He is not in acute distress.     Appearance: Normal appearance.   Eyes:      Conjunctiva/sclera: Conjunctivae normal.      Pupils: Pupils are equal, round, and reactive to light.   Neck:      Thyroid: No thyroid mass, thyromegaly or thyroid tenderness.      Vascular: No carotid bruit.   Cardiovascular:      Rate and Rhythm: Normal rate and regular rhythm.      Pulses: Normal pulses.      Heart sounds: Normal heart sounds.   Pulmonary:      Effort: Pulmonary effort is normal.      Breath sounds: Normal breath sounds.   Abdominal:      General: Bowel sounds are normal.      Palpations: Abdomen is soft.   Musculoskeletal:         General: No swelling or tenderness.      Right lower leg: No edema.      Left lower leg: No edema.   Skin:     General: Skin is warm and dry.   Neurological:      General: No focal deficit present.      Mental Status: He is alert and oriented to person, place, and time.   Psychiatric:         Mood and Affect: Mood normal.          Behavior: Behavior normal.             Assessment & Plan    Type 2 diabetes mellitus with chronic kidney disease, with long-term current use of insulin, unspecified CKD stage - Seems to be improving, will check lab  -     POCT Glucose, Hand-Held Device  -     Hemoglobin A1C; Future; Expected date: 10/18/2024  -     Basic Metabolic Panel; Future; Expected date: 10/18/2024  -     TSH; Future; Expected date: 10/18/2024  -      Increase Lantus to 22 units daily   -      Will try to resend PA info for Jardiance    Primary hypertension- at goal today, not on acei/arb, managed by Cardiology    Hyperlipidemia associated with type 2 diabetes mellitus - on statin    Coronary artery disease involving native coronary artery of native heart without angina pectoris - on asa, Brilinta and statin. Managed by Cardiology    Praised progress in compliance with insulin and other medications. Encouraged to continue.       - Follow up: 3 months    Visit today included increased complexity associated with the care of the episodic problem hyperglycemia addressed and managing the longitudinal care of the patient due to the serious and/or complex managed problem(s) T2DM.

## 2024-10-21 ENCOUNTER — PATIENT OUTREACH (OUTPATIENT)
Dept: ADMINISTRATIVE | Facility: HOSPITAL | Age: 69
End: 2024-10-21
Payer: MEDICARE

## 2024-10-21 NOTE — PROGRESS NOTES
Lab visit report: Patient has upcoming lab appointment on 10/23/24 for recheck of hemoglobin A1c.

## 2024-10-23 ENCOUNTER — TELEPHONE (OUTPATIENT)
Dept: ORTHOPEDICS | Facility: CLINIC | Age: 69
End: 2024-10-23
Payer: MEDICARE

## 2024-10-23 NOTE — TELEPHONE ENCOUNTER
Called the patient in regards to their appointment on 12/26/24 with Dr. Cline. Informed the patient that I need to reschedule their appointment due to the fact that Dr. Cline will be out of the office. Offered the patient an appointment on 01/10/25. Patient states to cancel this appointment. Appointment has been canceled per patient's request.

## 2024-10-24 ENCOUNTER — LAB VISIT (OUTPATIENT)
Dept: LAB | Facility: HOSPITAL | Age: 69
End: 2024-10-24
Attending: NURSE PRACTITIONER
Payer: MEDICARE

## 2024-10-24 ENCOUNTER — OFFICE VISIT (OUTPATIENT)
Dept: INTERNAL MEDICINE | Facility: CLINIC | Age: 69
End: 2024-10-24
Payer: MEDICARE

## 2024-10-24 VITALS
TEMPERATURE: 98 F | HEART RATE: 73 BPM | OXYGEN SATURATION: 99 % | SYSTOLIC BLOOD PRESSURE: 132 MMHG | HEIGHT: 70 IN | BODY MASS INDEX: 27.15 KG/M2 | DIASTOLIC BLOOD PRESSURE: 82 MMHG | WEIGHT: 189.63 LBS

## 2024-10-24 DIAGNOSIS — Z79.4 TYPE 2 DIABETES MELLITUS WITH CHRONIC KIDNEY DISEASE, WITH LONG-TERM CURRENT USE OF INSULIN, UNSPECIFIED CKD STAGE: ICD-10-CM

## 2024-10-24 DIAGNOSIS — I25.10 CORONARY ARTERY DISEASE INVOLVING NATIVE CORONARY ARTERY OF NATIVE HEART WITHOUT ANGINA PECTORIS: ICD-10-CM

## 2024-10-24 DIAGNOSIS — Z00.00 ANNUAL PHYSICAL EXAM: Primary | ICD-10-CM

## 2024-10-24 DIAGNOSIS — I10 PRIMARY HYPERTENSION: ICD-10-CM

## 2024-10-24 DIAGNOSIS — E11.22 TYPE 2 DIABETES MELLITUS WITH CHRONIC KIDNEY DISEASE, WITH LONG-TERM CURRENT USE OF INSULIN, UNSPECIFIED CKD STAGE: ICD-10-CM

## 2024-10-24 DIAGNOSIS — Z23 NEED FOR VACCINATION: ICD-10-CM

## 2024-10-24 DIAGNOSIS — I70.0 AORTIC ATHEROSCLEROSIS: ICD-10-CM

## 2024-10-24 DIAGNOSIS — E11.69 HYPERLIPIDEMIA ASSOCIATED WITH TYPE 2 DIABETES MELLITUS: ICD-10-CM

## 2024-10-24 DIAGNOSIS — E78.5 HYPERLIPIDEMIA ASSOCIATED WITH TYPE 2 DIABETES MELLITUS: ICD-10-CM

## 2024-10-24 DIAGNOSIS — R97.20 ELEVATED PSA: ICD-10-CM

## 2024-10-24 LAB
ANION GAP SERPL CALC-SCNC: 10 MMOL/L (ref 8–16)
BUN SERPL-MCNC: 13 MG/DL (ref 8–23)
CALCIUM SERPL-MCNC: 10.4 MG/DL (ref 8.7–10.5)
CHLORIDE SERPL-SCNC: 108 MMOL/L (ref 95–110)
CO2 SERPL-SCNC: 25 MMOL/L (ref 23–29)
CREAT SERPL-MCNC: 1.1 MG/DL (ref 0.5–1.4)
EST. GFR  (NO RACE VARIABLE): >60 ML/MIN/1.73 M^2
ESTIMATED AVG GLUCOSE: 220 MG/DL (ref 68–131)
GLUCOSE SERPL-MCNC: 168 MG/DL (ref 70–110)
HBA1C MFR BLD: 9.3 % (ref 4–5.6)
POTASSIUM SERPL-SCNC: 4.4 MMOL/L (ref 3.5–5.1)
SODIUM SERPL-SCNC: 143 MMOL/L (ref 136–145)
TSH SERPL DL<=0.005 MIU/L-ACNC: 1.33 UIU/ML (ref 0.4–4)

## 2024-10-24 PROCEDURE — 83036 HEMOGLOBIN GLYCOSYLATED A1C: CPT | Mod: HCNC | Performed by: NURSE PRACTITIONER

## 2024-10-24 PROCEDURE — 84443 ASSAY THYROID STIM HORMONE: CPT | Mod: HCNC | Performed by: NURSE PRACTITIONER

## 2024-10-24 PROCEDURE — 99999 PR PBB SHADOW E&M-EST. PATIENT-LVL IV: CPT | Mod: PBBFAC,HCNC,, | Performed by: FAMILY MEDICINE

## 2024-10-24 PROCEDURE — 80048 BASIC METABOLIC PNL TOTAL CA: CPT | Mod: HCNC | Performed by: NURSE PRACTITIONER

## 2024-10-24 RX ORDER — METOPROLOL TARTRATE 25 MG/1
25 TABLET, FILM COATED ORAL 2 TIMES DAILY
Qty: 60 TABLET | Refills: 11 | Status: SHIPPED | OUTPATIENT
Start: 2024-10-24 | End: 2025-10-24

## 2024-10-24 RX ORDER — PRAVASTATIN SODIUM 80 MG/1
80 TABLET ORAL DAILY
Qty: 90 TABLET | Refills: 3 | Status: SHIPPED | OUTPATIENT
Start: 2024-10-24 | End: 2025-10-24

## 2024-10-24 NOTE — PROGRESS NOTES
Tramaine Resendiz  10/24/2024  [id number]    Jeane Doyle MD  Patient Care Team:  Jeane Doyle MD as PCP - General (Family Medicine)  Sultana Ibarra NP as Nurse Practitioner (Internal Medicine)  Jesus Varma MD as Consulting Physician (Interventional Cardiology)  Alesha Louise MD as Consulting Physician (Ophthalmology)  Rosa Dominguez as ED Navigator  India Escobedo LPN as Licensed Practical Nurse          Visit Type:a scheduled routine follow-up visit    Chief Complaint:  Chief Complaint   Patient presents with    Annual Exam       History of Present Illness:    History of Present Illness    CHIEF COMPLAINT:  Mr. Resendiz presents today for an annual exam.    MEDICAL HISTORY:  He has a history of diabetes, hypertension, hyperlipidemia, coronary artery disease, elevated PSA, and proteinuria. His diabetes control has been poor, with a recent hemoglobin A1c of 10. His last LDL was 99 in February. He has been seen by Dr. Baum for elevated PSA. His last eGFR was greater than 60, indicating normal kidney function.    MEDICATIONS:  Lantus was recently increased to 22 units by diabetes management. He takes a statin, aspirin, and Brintell for coronary artery disease, which is managed by cardiology. He is not taking Jardiance or Ozempic due to cost concerns. He reports having tried glyburide in the past without successful blood sugar control. He denies taking any other medications for diabetes management at this time.    RECENT EMERGENCY ROOM VISIT:  He visited the emergency room in September for chest pain. Troponin and BMP were negative. He denies any recurrence of chest pain since the ER visit.    PREVENTIVE CARE:  He not completed influenza and pneumococcal vaccinations. Will offer today He has not completed colorectal screening despite referral for Cologuard. He has not undergone recommended AAA screening.            The following were reviewed at this visit: active problem list,  medication list, allergies, family history, social history, and health maintenance.  History:  Past Medical History:   Diagnosis Date    Heavy drinker     cut back around 2018/2019    Hypertension     Type 2 diabetes mellitus without complications 2012    BS didn't check 11/28/2022     Past Surgical History:   Procedure Laterality Date    CLOSURE DEVICE  2/13/2024    Procedure: Placement of Closure Device;  Surgeon: Matthew Orellana MD;  Location: [facility] CATH LAB;  Service: Cardiology;;    HERNIA REPAIR      IVUS, CORONARY  2/13/2024    Procedure: IVUS, Coronary;  Surgeon: Matthew Orellana MD;  Location: [facility] CATH LAB;  Service: Cardiology;;    LEFT HEART CATHETERIZATION Left 2/13/2024    Procedure: Left heart cath;  Surgeon: Matthew Orellana MD;  Location: [facility] CATH LAB;  Service: Cardiology;  Laterality: Left;    PERCUTANEOUS CORONARY INTERVENTION (PCI) FOR CHRONIC TOTAL OCCLUSION OF CORONARY ARTERY  2/13/2024    Procedure: PCI, FOR CHRONIC TOTAL CORONARY ARTERY OCCLUSION;  Surgeon: Matthew Orellana MD;  Location: [facility] CATH LAB;  Service: Cardiology;;    PERCUTANEOUS CORONARY INTERVENTION, ARTERY N/A 2/13/2024    Procedure: Percutaneous coronary intervention;  Surgeon: Matthew Orellana MD;  Location: [facility] CATH LAB;  Service: Cardiology;  Laterality: N/A;    PLACEMENT, IABP  2/13/2024    Procedure: Placement, IABP;  Surgeon: Matthew Orellana MD;  Location: [facility] CATH LAB;  Service: Cardiology;;    STENT, DRUG ELUTING, MULTI VESSEL, CORONARY  2/13/2024    Procedure: Stent, Drug Eluting, Multi Vessel, Coronary;  Surgeon: Matthew Orellana MD;  Location: [facility] CATH LAB;  Service: Cardiology;;    THROMBECTOMY, CORONARY  2/13/2024    Procedure: Thrombectomy, Coronary;  Surgeon: Matthew Orellana MD;  Location: [facility] CATH LAB;  Service: Cardiology;;     Patient Active Problem List   Diagnosis    Erectile dysfunction    Type 2 diabetes mellitus    Hyperlipidemia associated with type 2 diabetes mellitus    Hypertension     "Coronary artery disease involving native coronary artery of native heart without angina pectoris    Aortic atherosclerosis       Medications:  Current Outpatient Medications on File Prior to Visit   Medication Sig Dispense Refill    aspirin 81 MG Chew Take 1 tablet (81 mg total) by mouth once daily. 30 tablet 11    blood sugar diagnostic Strp To check BG 1-2 times daily, to use with insurance preferred meter 100 strip 2    empagliflozin (JARDIANCE) 25 mg tablet Take 1 tablet (25 mg total) by mouth once daily. 90 tablet 3    insulin glargine U-100, Lantus, (LANTUS SOLOSTAR U-100 INSULIN) 100 unit/mL (3 mL) InPn pen Inject up to 50 units per day 15 mL 1    lancets Misc To check BG 1-2 times daily, to use with insurance preferred meter 100 each 2    metoprolol tartrate (LOPRESSOR) 25 MG tablet Take 1 tablet (25 mg total) by mouth 2 (two) times daily. 60 tablet 11    nitroGLYCERIN (NITROSTAT) 0.4 MG SL tablet Place 1 tablet (0.4 mg total) under the tongue every 5 (five) minutes as needed for Chest pain (angina). 100 tablet 2    nitroGLYCERIN (NITROSTAT) 0.4 MG SL tablet Place 1 tablet (0.4 mg total) under the tongue every 5 (five) minutes as needed for Chest pain. 100 tablet 0    ondansetron (ZOFRAN-ODT) 4 MG TbDL Take 1 tablet (4 mg total) by mouth every 6 (six) hours as needed (Nausea). 20 tablet 0    pen needle, diabetic 32 gauge x 5/32" Ndle 1 each by Misc.(Non-Drug; Combo Route) route once daily. 100 each 1    pravastatin (PRAVACHOL) 80 MG tablet Take 1 tablet (80 mg total) by mouth once daily. 90 tablet 3    ticagrelor (BRILINTA) 90 mg tablet Take 1 tablet (90 mg total) by mouth 2 (two) times daily. 60 tablet 11    [DISCONTINUED] nirmatrelvir-ritonavir (PAXLOVID) 300 mg (150 mg x 2)-100 mg copackaged tablets (EUA) Take 3 tablets by mouth 2 (two) times daily. Each dose contains 2 nirmatrelvir (pink tablets) and 1 ritonavir (white tablet). Take all 3 tablets together 30 tablet 0    blood-glucose meter kit To check " BG 1-2 times daily, to use with insurance preferred meter 1 each 0     No current facility-administered medications on file prior to visit.       Medications have been reviewed and reconciled with patient at this visit.    Exam:  Wt Readings from Last 3 Encounters:   10/24/24 86 kg (189 lb 9.5 oz)   10/18/24 84.7 kg (186 lb 11.7 oz)   09/19/24 83.4 kg (183 lb 13.8 oz)     Temp Readings from Last 3 Encounters:   10/24/24 97.9 °F (36.6 °C) (Tympanic)   09/19/24 98 °F (36.7 °C) (Oral)   09/14/24 98.1 °F (36.7 °C) (Oral)     BP Readings from Last 3 Encounters:   10/24/24 (!) 140/82   10/18/24 138/82   09/19/24 (!) 186/92     Pulse Readings from Last 3 Encounters:   10/24/24 73   10/18/24 93   09/19/24 69     Body mass index is 27.2 kg/m².      Review of Systems   Constitutional: Negative.  Negative for chills and fever.   HENT: Negative.  Negative for congestion, sinus pain and sore throat.    Eyes:  Negative for blurred vision and double vision.   Respiratory:  Negative for cough, sputum production, shortness of breath and wheezing.    Cardiovascular:  Negative for chest pain, palpitations and leg swelling.   Gastrointestinal:  Negative for abdominal pain, constipation, diarrhea, heartburn, nausea and vomiting.   Genitourinary: Negative.    Musculoskeletal:  Positive for back pain and joint pain.   Skin: Negative.  Negative for rash.   Neurological: Negative.    Endo/Heme/Allergies: Negative.  Negative for polydipsia. Does not bruise/bleed easily.   Psychiatric/Behavioral:  Negative for depression and substance abuse.      Physical Exam  Nursing note reviewed.   Cardiovascular:      Rate and Rhythm: Normal rate and regular rhythm.   Pulmonary:      Effort: Pulmonary effort is normal. No respiratory distress.   Neurological:      Mental Status: He is alert and oriented to person, place, and time.   Psychiatric:         Mood and Affect: Mood normal.         Behavior: Behavior normal.         Thought Content: Thought  content normal.         Judgment: Judgment normal.       Physical Exam    Vitals: Blood pressure: 140/82, then reduced to 130/80.        Laboratory Reviewed ({Yes)  Lab Results   Component Value Date    WBC 6.95 09/14/2024    HGB 12.7 (L) 09/14/2024    HCT 40.4 09/14/2024     09/14/2024    CHOL 154 02/12/2024    TRIG 59 02/12/2024    HDL 43 02/12/2024    ALT 22 09/14/2024    AST 17 09/14/2024     09/14/2024    K 3.7 09/14/2024     09/14/2024    CREATININE 0.9 09/14/2024    BUN 10 09/14/2024    CO2 22 (L) 09/14/2024    PSA 5.5 (H) 09/26/2022    INR 1.0 02/12/2024    HGBA1C 10.6 (H) 06/28/2024       Tramaine was seen today for annual exam.    Diagnoses and all orders for this visit:    Annual physical exam    Type 2 diabetes mellitus with chronic kidney disease, with long-term current use of insulin, unspecified CKD stage    Aortic atherosclerosis    Hyperlipidemia associated with type 2 diabetes mellitus    Primary hypertension    Coronary artery disease involving native coronary artery of native heart without angina pectoris    Elevated PSA    Need for vaccination  -     Influenza - Trivalent (Adjuvanted)          Assessment & Plan    N40.0 Benign prostatic hyperplasia without lower urinary tract symptoms      R97.20 Elevated prostate specific antigen [PSA    I10 Essential (primary) hypertension\    Z00.01 Encounter for general adult medical exam with abnormal findings    Z79.4 Long term (current) use of insulin    R80.9 Proteinuria, unspecified    E78.5 Hyperlipidemia, unspecified    I25.10 Atherosclerotic heart disease of native coronary artery   without angina pectoris  E11.9 Type 2 diabetes mellitus     Z23 Encounter for immunization    Assessed diabetes control: poor, with last HbA1c at 10  Evaluated cardiovascular risk factors: hypertension, hyperlipidemia, CAD    Noted proteinuria without chronic kidney disease; EGFR >60  Reviewed PSA elevation, managed by Dr. Baum    Considered cost  barriers to Jardiance and Ozempic    Noted previous trial of glyburide was ineffective    Reviewed recent ER visit for chest pain; troponins negative needs to reschedule with Dr. Orellana    COLORECTAL CANCER SCREENING:  - Mr. Resendiz to complete colorectal screening (Cologuard).    ABDOMINAL AORTIC ANEURYSM SCREENING:  - Mr. Resendiz to undergo AAA screening.    DIABETES:  - Continued Lantus 22 units daily.  - HbA1c ordered.    HYPERLIPIDEMIA:  - Continued statin.    CARDIOVASCULAR DISEASE PREVENTION:  - Continued aspirin.    MEDICATIONS/SUPPLEMENTS:  - Continued Brintella    LABS:  - BMP (Basic Metabolic Panel) ordered.  - TSH (Thyroid Stimulating Hormone) ordered.    PROSTATE CANCER SCREENING:  - Follow up with Urology in 6 months for repeat PSA.    FOLLOW UP:  - Follow up with Cardiology (as instructed after ER visit).  Will arrange    Flu shot today  Turn in cologuard.           Care Plan/Goals: Reviewed     Follow up: Follow up in about 6 months (around 4/24/2025).    After visit summary was printed and given to patient upon discharge today.  Patient goals and care plan are included in After Visit Summary.

## 2024-10-25 ENCOUNTER — TELEPHONE (OUTPATIENT)
Dept: DIABETES | Facility: CLINIC | Age: 69
End: 2024-10-25
Payer: MEDICARE

## 2024-10-25 NOTE — TELEPHONE ENCOUNTER
Called pt and explained to pt that his A1c is better but not quite where we want it and that he needs to keep taking his insulin every day. Also that his thyroid is normal. Pt agreed to continue to take his insulin everyday.         Attempted to call pt to explain to him that his A1c is better but not quite where we want it and that he needs to keep taking his insulin every day. Also that his thyroid is normal but pt did not answer and the primary contact listed vm is too full to lvm. I will attempt to call  again in about an hour to inform him of his results.       ----- Message from Ernestina Arceo NP sent at 10/25/2024  8:50 AM CDT -----  Please let Mr Resendiz know his A1c is better but not quite where we want it. He needs to keep taking his insulin every day.  His thyroid is normal

## 2024-12-16 ENCOUNTER — OFFICE VISIT (OUTPATIENT)
Dept: INTERNAL MEDICINE | Facility: CLINIC | Age: 69
End: 2024-12-16
Payer: MEDICARE

## 2024-12-16 VITALS
OXYGEN SATURATION: 99 % | DIASTOLIC BLOOD PRESSURE: 78 MMHG | TEMPERATURE: 97 F | HEART RATE: 78 BPM | SYSTOLIC BLOOD PRESSURE: 134 MMHG | WEIGHT: 188.69 LBS | HEIGHT: 70 IN | BODY MASS INDEX: 27.01 KG/M2

## 2024-12-16 DIAGNOSIS — M79.672 LEFT FOOT PAIN: Primary | ICD-10-CM

## 2024-12-16 DIAGNOSIS — E78.5 HYPERLIPIDEMIA ASSOCIATED WITH TYPE 2 DIABETES MELLITUS: ICD-10-CM

## 2024-12-16 DIAGNOSIS — E11.69 HYPERLIPIDEMIA ASSOCIATED WITH TYPE 2 DIABETES MELLITUS: ICD-10-CM

## 2024-12-16 DIAGNOSIS — I10 PRIMARY HYPERTENSION: ICD-10-CM

## 2024-12-16 PROCEDURE — 3075F SYST BP GE 130 - 139MM HG: CPT | Mod: HCNC,CPTII,S$GLB,

## 2024-12-16 PROCEDURE — 1159F MED LIST DOCD IN RCRD: CPT | Mod: HCNC,CPTII,S$GLB,

## 2024-12-16 PROCEDURE — 1160F RVW MEDS BY RX/DR IN RCRD: CPT | Mod: HCNC,CPTII,S$GLB,

## 2024-12-16 PROCEDURE — 3066F NEPHROPATHY DOC TX: CPT | Mod: HCNC,CPTII,S$GLB,

## 2024-12-16 PROCEDURE — 3288F FALL RISK ASSESSMENT DOCD: CPT | Mod: HCNC,CPTII,S$GLB,

## 2024-12-16 PROCEDURE — 3008F BODY MASS INDEX DOCD: CPT | Mod: HCNC,CPTII,S$GLB,

## 2024-12-16 PROCEDURE — 99214 OFFICE O/P EST MOD 30 MIN: CPT | Mod: HCNC,S$GLB,,

## 2024-12-16 PROCEDURE — 3078F DIAST BP <80 MM HG: CPT | Mod: HCNC,CPTII,S$GLB,

## 2024-12-16 PROCEDURE — 1125F AMNT PAIN NOTED PAIN PRSNT: CPT | Mod: HCNC,CPTII,S$GLB,

## 2024-12-16 PROCEDURE — 3046F HEMOGLOBIN A1C LEVEL >9.0%: CPT | Mod: HCNC,CPTII,S$GLB,

## 2024-12-16 PROCEDURE — 99999 PR PBB SHADOW E&M-EST. PATIENT-LVL IV: CPT | Mod: PBBFAC,HCNC,,

## 2024-12-16 PROCEDURE — 1101F PT FALLS ASSESS-DOCD LE1/YR: CPT | Mod: HCNC,CPTII,S$GLB,

## 2024-12-16 PROCEDURE — 3060F POS MICROALBUMINURIA REV: CPT | Mod: HCNC,CPTII,S$GLB,

## 2024-12-16 RX ORDER — IBUPROFEN 600 MG/1
600 TABLET ORAL EVERY 8 HOURS PRN
Qty: 30 TABLET | Refills: 0 | Status: SHIPPED | OUTPATIENT
Start: 2024-12-16

## 2024-12-16 NOTE — PROGRESS NOTES
Tramaine Resendiz  69 y.o. Black or  male  [id number]    Jeane Doyle MD  Patient Care Team:  Jeane Doyle MD as PCP - General (Family Medicine)  Sultana Ibarra NP as Nurse Practitioner (Internal Medicine)  Jesus Varma MD as Consulting Physician (Interventional Cardiology)  Alesha Louise MD as Consulting Physician (Ophthalmology)  Rosa Dominguez as ED Navigator  India Escobedo LPN as Licensed Practical Nurse    Chief Complaint:   Chief Complaint   Patient presents with    Pain     Left foot pain started saturday       History of Present Illness:  Pt is here for Left foot pain and is new to me.    He presents with pain in the left foot and great toe that started 2 days ago. Pain is localized to the first metatarsophalangeal joint and sole of the foot. He took Tylenol which provided minimal relief. He denies any trauma or injury to the area. Denies any swelling, redness, or history of gout. Denies any CP, SOB, or fever.    The following were reviewed: active problem list, medication list, allergies, family history, social history, and Health Maintenance.     History:  Past Medical History:   Diagnosis Date    Heavy drinker     cut back around 2018/2019    Hypertension     Type 2 diabetes mellitus without complications 2012    BS didn't check 11/28/2022     Past Surgical History:   Procedure Laterality Date    CLOSURE DEVICE  2/13/2024    Procedure: Placement of Closure Device;  Surgeon: Matthew Orellana MD;  Location: [facility] CATH LAB;  Service: Cardiology;;    HERNIA REPAIR      IVUS, CORONARY  2/13/2024    Procedure: IVUS, Coronary;  Surgeon: Matthew Orellana MD;  Location: [facility] CATH LAB;  Service: Cardiology;;    LEFT HEART CATHETERIZATION Left 2/13/2024    Procedure: Left heart cath;  Surgeon: Matthew Orellana MD;  Location: [facility] CATH LAB;  Service: Cardiology;  Laterality: Left;    PERCUTANEOUS CORONARY INTERVENTION (PCI) FOR CHRONIC TOTAL OCCLUSION OF CORONARY  ARTERY  2/13/2024    Procedure: PCI, FOR CHRONIC TOTAL CORONARY ARTERY OCCLUSION;  Surgeon: Matthew Orellana MD;  Location: [facility] CATH LAB;  Service: Cardiology;;    PERCUTANEOUS CORONARY INTERVENTION, ARTERY N/A 2/13/2024    Procedure: Percutaneous coronary intervention;  Surgeon: Matthew Orellana MD;  Location: [facility] CATH LAB;  Service: Cardiology;  Laterality: N/A;    PLACEMENT, IABP  2/13/2024    Procedure: Placement, IABP;  Surgeon: Matthew Orellana MD;  Location: [facility] CATH LAB;  Service: Cardiology;;    STENT, DRUG ELUTING, MULTI VESSEL, CORONARY  2/13/2024    Procedure: Stent, Drug Eluting, Multi Vessel, Coronary;  Surgeon: Matthew Orellana MD;  Location: [facility] CATH LAB;  Service: Cardiology;;    THROMBECTOMY, CORONARY  2/13/2024    Procedure: Thrombectomy, Coronary;  Surgeon: Matthew Orellana MD;  Location: [facility] CATH LAB;  Service: Cardiology;;     Family History   Problem Relation Name Age of Onset    Hypertension Mother      Diabetes Mother       Social History     Socioeconomic History    Marital status:    Tobacco Use    Smoking status: Former     Types: Cigarettes    Smokeless tobacco: Never   Substance and Sexual Activity    Alcohol use: Not Currently    Drug use: Never    Sexual activity: Not Currently     Social Drivers of Health     Financial Resource Strain: High Risk (7/31/2024)    Overall Financial Resource Strain (CARDIA)     Difficulty of Paying Living Expenses: Hard   Food Insecurity: Food Insecurity Present (7/31/2024)    Hunger Vital Sign     Worried About Running Out of Food in the Last Year: Often true     Ran Out of Food in the Last Year: Never true   Transportation Needs: No Transportation Needs (7/31/2024)    PRAPARE - Transportation     Lack of Transportation (Medical): No     Lack of Transportation (Non-Medical): No   Physical Activity: Insufficiently Active (7/31/2024)    Exercise Vital Sign     Days of Exercise per Week: 3 days     Minutes of Exercise per Session: 30 min    Stress: No Stress Concern Present (7/31/2024)    [employer] [place] [employer] - Occupational Stress Questionnaire     Feeling of Stress : Not at all   Housing Stability: Low Risk  (7/31/2024)    Housing Stability Vital Sign     Unable to Pay for Housing in the Last Year: No     Homeless in the Last Year: No     Patient Active Problem List   Diagnosis    Erectile dysfunction    Type 2 diabetes mellitus    Hyperlipidemia associated with type 2 diabetes mellitus    Hypertension    Coronary artery disease involving native coronary artery of native heart without angina pectoris    Aortic atherosclerosis     Review of patient's allergies indicates:   Allergen Reactions    Tomato        Health Maintenance  Health Maintenance Due   Topic Date Due    TETANUS VACCINE  Never done    Colorectal Cancer Screening  Never done    Shingles Vaccine (1 of 2) Never done    RSV Vaccine (Age 60+ and Pregnant patients) (1 - Risk 60-74 years 1-dose series) Never done    Abdominal Aortic Aneurysm Screening  Never done    COVID-19 Vaccine (5 - 2024-25 season) 09/01/2024    Foot Exam  01/25/2025       Medications:  Current Outpatient Medications on File Prior to Visit   Medication Sig Dispense Refill    aspirin 81 MG Chew Take 1 tablet (81 mg total) by mouth once daily. 30 tablet 11    blood sugar diagnostic Strp To check BG 1-2 times daily, to use with insurance preferred meter 100 strip 2    empagliflozin (JARDIANCE) 25 mg tablet Take 1 tablet (25 mg total) by mouth once daily. 90 tablet 3    insulin glargine U-100, Lantus, (LANTUS SOLOSTAR U-100 INSULIN) 100 unit/mL (3 mL) InPn pen Inject up to 50 units per day 15 mL 1    lancets Misc To check BG 1-2 times daily, to use with insurance preferred meter 100 each 2    metoprolol tartrate (LOPRESSOR) 25 MG tablet Take 1 tablet (25 mg total) by mouth 2 (two) times daily. 60 tablet 11    nitroGLYCERIN (NITROSTAT) 0.4 MG SL tablet Place 1 tablet (0.4 mg total) under the tongue every  "5 (five) minutes as needed for Chest pain (angina). 100 tablet 2    nitroGLYCERIN (NITROSTAT) 0.4 MG SL tablet Place 1 tablet (0.4 mg total) under the tongue every 5 (five) minutes as needed for Chest pain. 100 tablet 0    ondansetron (ZOFRAN-ODT) 4 MG TbDL Take 1 tablet (4 mg total) by mouth every 6 (six) hours as needed (Nausea). 20 tablet 0    pen needle, diabetic 32 gauge x 5/32" Ndle 1 each by Misc.(Non-Drug; Combo Route) route once daily. 100 each 1    pravastatin (PRAVACHOL) 80 MG tablet Take 1 tablet (80 mg total) by mouth once daily. 90 tablet 3    ticagrelor (BRILINTA) 90 mg tablet Take 1 tablet (90 mg total) by mouth 2 (two) times daily. 60 tablet 11    blood-glucose meter kit To check BG 1-2 times daily, to use with insurance preferred meter 1 each 0     No current facility-administered medications on file prior to visit.       Medications have been reviewed and reconciled with patient at visit today.    Laboratory Reviewed: (Yes)  Lab Results   Component Value Date    WBC 6.95 09/14/2024    HGB 12.7 (L) 09/14/2024    HCT 40.4 09/14/2024     09/14/2024    CHOL 154 02/12/2024    TRIG 59 02/12/2024    HDL 43 02/12/2024    ALT 22 09/14/2024    AST 17 09/14/2024     10/24/2024    K 4.4 10/24/2024     10/24/2024    CREATININE 1.1 10/24/2024    BUN 13 10/24/2024    CO2 25 10/24/2024    TSH 1.328 10/24/2024    PSA 5.5 (H) 09/26/2022    INR 1.0 02/12/2024    HGBA1C 9.3 (H) 10/24/2024       ROS:  Review of Systems   Constitutional:  Negative for chills and fever.   HENT:  Negative for congestion and sore throat.    Respiratory:  Negative for cough and shortness of breath.    Cardiovascular:  Negative for chest pain and leg swelling.   Gastrointestinal:  Negative for abdominal pain, constipation, diarrhea and vomiting.   Musculoskeletal:  Positive for joint pain (Left foot & great toe).   Skin:  Negative for rash.   Neurological:  Negative for dizziness, weakness and headaches. "       Exam:  Vitals:    12/16/24 1042   BP: 134/78   Pulse: 78   Temp: 96.8 °F (36 °C)     Weight: 85.6 kg (188 lb 11.4 oz)   Body mass index is 27.08 kg/m².    Physical Exam  Constitutional:       General: He is awake. He is not in acute distress.  Eyes:      Conjunctiva/sclera: Conjunctivae normal.   Cardiovascular:      Rate and Rhythm: Normal rate and regular rhythm.      Heart sounds: Normal heart sounds. No murmur heard.  Pulmonary:      Effort: Pulmonary effort is normal. No respiratory distress.      Breath sounds: Normal breath sounds.   Musculoskeletal:         General: No swelling or deformity.        Feet:    Feet:      Comments: Diffuse tenderness noted to Left first metatarsal and sole of Left foot. No erythema, warmth, or swelling noted. ROM and sensation intact. No visible lesions or sores.  Skin:     General: Skin is warm and dry.   Neurological:      General: No focal deficit present.      Mental Status: He is alert.   Psychiatric:         Mood and Affect: Mood normal.         Behavior: Behavior normal.       Assessment:  The primary encounter diagnosis was Left foot pain. Diagnoses of Primary hypertension and Hyperlipidemia associated with type 2 diabetes mellitus were also pertinent to this visit.    Plan:  IMPRESSION:  - Assessed patient's foot pain, particularly in the great toe  - Considered gout as potential diagnosis but ruled it out based on physical exam  - Determined anti-inflammatory medication trial appropriate to address pain and inflammation    LEFT FOOT PAIN:  FOLLOW-UP:  - Follow up in 2 weeks if symptoms do not improve with meloxicam.     1. Left foot pain  -     ibuprofen (ADVIL,MOTRIN) 600 MG tablet; Take 1 tablet (600 mg total) by mouth every 8 (eight) hours as needed for Pain.  Dispense: 30 tablet; Refill: 0  - Explained that the condition does not appear to be gout based on exam findings.  - Discussed signs of gout such as significant swelling and redness.  - Started short  course of ibuprofen every 8 hours as needed for pain for 2 weeks. Kidney function normal on previous labs.  - Take with food.  - Avoid other OTC pain medications (Aleve, Advil, Motrin) while taking prescribed ibuprofen.  - Contact the office if pain worsens or if foot becomes significantly swollen and red.    2. Primary hypertension    3. Hyperlipidemia associated with type 2 diabetes mellitus  - Continue pravastatin 80 mg.    4. Type 2 diabetes mellitus    Care plan/goals: reviewed    Follow up: Follow up if symptoms worsen or fail to improve.

## 2024-12-26 ENCOUNTER — PATIENT OUTREACH (OUTPATIENT)
Dept: ADMINISTRATIVE | Facility: HOSPITAL | Age: 69
End: 2024-12-26
Payer: MEDICARE

## 2024-12-26 NOTE — PROGRESS NOTES
VBHM Score: 2     Colon Cancer Screening  AAA Screening    Tetanus Vaccine  Shingles/Zoster Vaccine  RSV Vaccine            Assisted to schedule AAA US and A1C, 1/17/25 while in clinic for other appt.  Pt reports he does have cologuard kit. Encouraged him to complete and return and earliest convenience.

## 2025-01-17 ENCOUNTER — HOSPITAL ENCOUNTER (OUTPATIENT)
Dept: RADIOLOGY | Facility: HOSPITAL | Age: 70
Discharge: HOME | End: 2025-01-17
Attending: FAMILY MEDICINE
Payer: MEDICARE

## 2025-01-17 ENCOUNTER — OFFICE VISIT (OUTPATIENT)
Dept: DIABETES | Facility: CLINIC | Age: 70
End: 2025-01-17
Payer: MEDICARE

## 2025-01-17 VITALS
SYSTOLIC BLOOD PRESSURE: 159 MMHG | BODY MASS INDEX: 27.81 KG/M2 | DIASTOLIC BLOOD PRESSURE: 84 MMHG | WEIGHT: 193.81 LBS | HEART RATE: 78 BPM

## 2025-01-17 DIAGNOSIS — E11.69 HYPERLIPIDEMIA ASSOCIATED WITH TYPE 2 DIABETES MELLITUS: ICD-10-CM

## 2025-01-17 DIAGNOSIS — E11.22 TYPE 2 DIABETES MELLITUS WITH CHRONIC KIDNEY DISEASE, WITH LONG-TERM CURRENT USE OF INSULIN, UNSPECIFIED CKD STAGE: Primary | ICD-10-CM

## 2025-01-17 DIAGNOSIS — Z79.4 TYPE 2 DIABETES MELLITUS WITH CHRONIC KIDNEY DISEASE, WITH LONG-TERM CURRENT USE OF INSULIN, UNSPECIFIED CKD STAGE: Primary | ICD-10-CM

## 2025-01-17 DIAGNOSIS — E11.65 TYPE 2 DIABETES MELLITUS WITH HYPERGLYCEMIA, WITHOUT LONG-TERM CURRENT USE OF INSULIN: ICD-10-CM

## 2025-01-17 DIAGNOSIS — E78.5 HYPERLIPIDEMIA ASSOCIATED WITH TYPE 2 DIABETES MELLITUS: ICD-10-CM

## 2025-01-17 DIAGNOSIS — I25.10 CORONARY ARTERY DISEASE INVOLVING NATIVE CORONARY ARTERY OF NATIVE HEART WITHOUT ANGINA PECTORIS: ICD-10-CM

## 2025-01-17 DIAGNOSIS — I10 PRIMARY HYPERTENSION: ICD-10-CM

## 2025-01-17 LAB — GLUCOSE SERPL-MCNC: 259 MG/DL (ref 70–110)

## 2025-01-17 PROCEDURE — 1159F MED LIST DOCD IN RCRD: CPT | Mod: HCNC,CPTII,S$GLB, | Performed by: NURSE PRACTITIONER

## 2025-01-17 PROCEDURE — 99999 PR PBB SHADOW E&M-EST. PATIENT-LVL III: CPT | Mod: PBBFAC,HCNC,, | Performed by: NURSE PRACTITIONER

## 2025-01-17 PROCEDURE — G2211 COMPLEX E/M VISIT ADD ON: HCPCS | Mod: HCNC,S$GLB,, | Performed by: NURSE PRACTITIONER

## 2025-01-17 PROCEDURE — 3072F LOW RISK FOR RETINOPATHY: CPT | Mod: HCNC,CPTII,S$GLB, | Performed by: NURSE PRACTITIONER

## 2025-01-17 PROCEDURE — 1126F AMNT PAIN NOTED NONE PRSNT: CPT | Mod: HCNC,CPTII,S$GLB, | Performed by: NURSE PRACTITIONER

## 2025-01-17 PROCEDURE — 3079F DIAST BP 80-89 MM HG: CPT | Mod: HCNC,CPTII,S$GLB, | Performed by: NURSE PRACTITIONER

## 2025-01-17 PROCEDURE — 3008F BODY MASS INDEX DOCD: CPT | Mod: HCNC,CPTII,S$GLB, | Performed by: NURSE PRACTITIONER

## 2025-01-17 PROCEDURE — 82962 GLUCOSE BLOOD TEST: CPT | Mod: HCNC,S$GLB,, | Performed by: NURSE PRACTITIONER

## 2025-01-17 PROCEDURE — 1160F RVW MEDS BY RX/DR IN RCRD: CPT | Mod: HCNC,CPTII,S$GLB, | Performed by: NURSE PRACTITIONER

## 2025-01-17 PROCEDURE — 76706 US ABDL AORTA SCREEN AAA: CPT | Mod: 26,HCNC,, | Performed by: STUDENT IN AN ORGANIZED HEALTH CARE EDUCATION/TRAINING PROGRAM

## 2025-01-17 PROCEDURE — 1101F PT FALLS ASSESS-DOCD LE1/YR: CPT | Mod: HCNC,CPTII,S$GLB, | Performed by: NURSE PRACTITIONER

## 2025-01-17 PROCEDURE — 3077F SYST BP >= 140 MM HG: CPT | Mod: HCNC,CPTII,S$GLB, | Performed by: NURSE PRACTITIONER

## 2025-01-17 PROCEDURE — 99214 OFFICE O/P EST MOD 30 MIN: CPT | Mod: HCNC,S$GLB,, | Performed by: NURSE PRACTITIONER

## 2025-01-17 PROCEDURE — 76706 US ABDL AORTA SCREEN AAA: CPT | Mod: TC,HCNC

## 2025-01-17 PROCEDURE — 3288F FALL RISK ASSESSMENT DOCD: CPT | Mod: HCNC,CPTII,S$GLB, | Performed by: NURSE PRACTITIONER

## 2025-01-17 NOTE — PROGRESS NOTES
"    Patient ID: Tramaine Resendiz is a 69 y.o. male.  Patient's current PCP is Jeane Doyle MD.     Chief Complaint: Diabetes Mellitus    HPI:  Tramaine Resendiz is a 69 y.o. Black or  male presenting for a follow up for diabetes.     Patient has been diagnosed with type 2 diabetes for > 10 years.  Recent diabetes related hospitalizations: None since last visit  Pertinent to decision making is/are the following comorbidities:  Htn  Hln  Cad  HX of MI    Complications related to diabetes: nephropathy and cardiovascular disease  Previous diabetes education: no, declines  Occupation:Retired [employer]/,     Current diet:  Eating 3 meals per day. Improving discretion. Weight up 5#  Activity level: Reports being more active daily in yard/around the house - Denies CP and CASTRO. Went fishing yesterday and felt good    Changes made at last visit:   -      Increase Lantus to 22 units daily   -      Will try to resend PA info for Jardiance        Current issues: Reports episodic heart palpations. Discomfort eased with Nitro. Denies nausea or diaphoresis. States taking all medications and they are "working good". Not taking Jardiance    CURRENT DM MEDICATIONS:   Diabetes Medications               insulin glargine U-100, Lantus, (LANTUS SOLOSTAR U-100 INSULIN) 100 unit/mL (3 mL) InPn pen Inject 22 Units into the skin every morning.       Past failed treatment(s) include:   Glyburide  Ozempic - too expensive  Metformin - severe diarrhea    His blood sugar in the clinic today was:   Lab Results   Component Value Date    POCGLU 259 (A) 01/17/2025       Meter/cgm: -  Blood glucose testing is not performed.   Patient is testing 0 times per day.  Any episodes of hypoglycemia? Denies   Glucose trends: unknown      Diabetes Management Status    Statin: Taking  ACE/ARB: Not taking    Screening or Prevention Patient's value Goal Complete/Controlled?   HgA1C Testing and Control   Lab Results " "  Component Value Date    HGBA1C 9.3 (H) 10/24/2024      Annually/Less than 8% No   Lipid profile : 02/12/2024 Annually Yes   LDL control Lab Results   Component Value Date    LDLCALC 99.2 02/12/2024    Annually/Less than 100 mg/dl  Yes   Nephropathy screening Lab Results   Component Value Date    LABMICR 310.0 03/21/2024     Lab Results   Component Value Date    PROTEINUA 1+ (A) 09/14/2024     No results found for: "UTPCR"   Annually Yes   Blood pressure BP Readings from Last 1 Encounters:   01/17/25 (!) 163/91    Less than 140/90 Yes   Dilated retinal exam : 07/25/2024 Annually Yes   Foot exam   : 01/25/2024 and 8/7/24 Annually Yes       Preferred lab site: [facility]  Preferred visit site: [facility]    Wt Readings from Last 3 Encounters:   01/17/25 0813 87.9 kg (193 lb 12.6 oz)   12/16/24 1042 85.6 kg (188 lb 11.4 oz)   10/24/24 0802 86 kg (189 lb 9.5 oz)     Labs reviewed and are noted below.    Lab Results   Component Value Date    HGBA1C 9.3 (H) 10/24/2024    HGBA1C 10.6 (H) 06/28/2024    HGBA1C 10.9 (H) 03/21/2024     Lab Results   Component Value Date    WBC 6.95 09/14/2024    HGB 12.7 (L) 09/14/2024    HCT 40.4 09/14/2024     09/14/2024    CHOL 154 02/12/2024    TRIG 59 02/12/2024    HDL 43 02/12/2024    LDLCALC 99.2 02/12/2024    ALT 22 09/14/2024    AST 17 09/14/2024     10/24/2024    K 4.4 10/24/2024     10/24/2024    ANIONGAP 10 10/24/2024    CREATININE 1.1 10/24/2024    ESTGFRAFRICA >60 01/08/2019    EGFRNONAA >60 01/08/2019    BUN 13 10/24/2024    CO2 25 10/24/2024    TSH 1.328 10/24/2024    PSA 5.5 (H) 09/26/2022    INR 1.0 02/12/2024     (H) 10/24/2024     Lab Results   Component Value Date    TSH 1.328 10/24/2024    KSOXSXEH24 822 10/04/2023    CALCIUM 10.4 10/24/2024     No results found for: "CPEPTIDE"  No results found for: "GLUTAMICACID"  Glucose   Date Value Ref Range Status   10/24/2024 168 (H) 70 - 110 mg/dL Final     Anion Gap   Date Value Ref Range Status   10/24/2024 10 8 " - 16 mmol/L Final     eGFR if    Date Value Ref Range Status   01/08/2019 >60 >60 mL/min/1.73 m^2 Final     eGFR if non    Date Value Ref Range Status   01/08/2019 >60 >60 mL/min/1.73 m^2 Final     Comment:     Calculation used to obtain the estimated glomerular filtration  rate (eGFR) is the CKD-EPI equation.              Review of patient's allergies indicates:   Allergen Reactions    Tomato      Social History     Socioeconomic History    Marital status:    Tobacco Use    Smoking status: Former     Types: Cigarettes    Smokeless tobacco: Never   Substance and Sexual Activity    Alcohol use: Not Currently    Drug use: Never    Sexual activity: Not Currently     Social Drivers of Health     Financial Resource Strain: High Risk (7/31/2024)    Overall Financial Resource Strain (CARDIA)     Difficulty of Paying Living Expenses: Hard   Food Insecurity: Food Insecurity Present (7/31/2024)    Hunger Vital Sign     Worried About Running Out of Food in the Last Year: Often true     Ran Out of Food in the Last Year: Never true   Transportation Needs: No Transportation Needs (7/31/2024)    PRAPARE - Transportation     Lack of Transportation (Medical): No     Lack of Transportation (Non-Medical): No   Physical Activity: Insufficiently Active (7/31/2024)    Exercise Vital Sign     Days of Exercise per Week: 3 days     Minutes of Exercise per Session: 30 min   Stress: No Stress Concern Present (7/31/2024)    [employer] [employer] - Occupational Stress Questionnaire     Feeling of Stress : Not at all   Housing Stability: Low Risk  (7/31/2024)    Housing Stability Vital Sign     Unable to Pay for Housing in the Last Year: No     Homeless in the Last Year: No     Past Medical History:   Diagnosis Date    Heavy drinker     cut back around 2018/2019    Hypertension     Type 2 diabetes mellitus without complications 2012    BS didn't check 11/28/2022       Review of Systems    Constitutional:  Negative for malaise/fatigue and weight loss.   Eyes:  Negative for blurred vision and double vision.   Respiratory:  Negative for shortness of breath.    Cardiovascular:  Positive for palpitations (intermittent). Negative for chest pain, claudication and leg swelling.   Gastrointestinal: Negative.    Genitourinary:  Negative for frequency.   Musculoskeletal:  Negative for back pain, falls and myalgias.   Neurological: Negative.    Psychiatric/Behavioral: Negative.           Physical Exam  Vitals reviewed.   Constitutional:       General: He is not in acute distress.     Appearance: Normal appearance. He is obese.   Eyes:      Conjunctiva/sclera: Conjunctivae normal.      Pupils: Pupils are equal, round, and reactive to light.   Neck:      Thyroid: No thyroid mass, thyromegaly or thyroid tenderness.      Vascular: No carotid bruit.   Cardiovascular:      Rate and Rhythm: Normal rate and regular rhythm.      Pulses: Normal pulses.      Heart sounds: Normal heart sounds. No murmur heard.  Pulmonary:      Effort: Pulmonary effort is normal.      Breath sounds: Normal breath sounds.   Abdominal:      General: Bowel sounds are normal. There is no distension.      Palpations: Abdomen is soft.   Musculoskeletal:      Cervical back: Normal range of motion and neck supple.      Right lower leg: No edema.      Left lower leg: No edema.   Skin:     General: Skin is warm and dry.      Comments: Sites without hypertrophy and/or infection   Neurological:      General: No focal deficit present.      Mental Status: He is alert and oriented to person, place, and time.   Psychiatric:         Mood and Affect: Mood normal.         Behavior: Behavior normal.             Assessment & Plan    Type 2 diabetes mellitus with chronic kidney disease, with long-term current use of insulin, unspecified CKD stage  -     POCT Glucose, Hand-Held Device  -     Basic Metabolic Panel; Future; Expected date: 01/17/2025  -      Hemoglobin A1C; Future; Expected date: 01/17/2025    Hyperlipidemia associated with type 2 diabetes mellitus- taking statin    Coronary artery disease involving native coronary artery of native heart without angina pectoris    Primary hypertension - not at goal, not on ACEI/ARB  -     Basic Metabolic Panel; Future; Expected date: 01/17/2025    - Reviewed with patient:  The basic pathophysiology of Type 2 diabetes  Mechanism of action and action time of medications  Use of home glucose monitor/cgm  Basic diet/carbohydrate counting/avoiding simple sugars/plate method  Proper hydration   Risk of complications and preventive measures  When to call for assistance      - Follow up: 3 months    Visit today included increased complexity associated with the care of the episodic problem hyperglycemia addressed and managing the longitudinal care of the patient due to the serious and/or complex managed problem(s) T2DM.

## 2025-01-21 ENCOUNTER — TELEPHONE (OUTPATIENT)
Dept: DIABETES | Facility: CLINIC | Age: 70
End: 2025-01-21
Payer: MEDICARE

## 2025-01-21 RX ORDER — GLIMEPIRIDE 4 MG/1
4 TABLET ORAL 2 TIMES DAILY WITH MEALS
Qty: 180 TABLET | Refills: 1 | Status: SHIPPED | OUTPATIENT
Start: 2025-01-21 | End: 2026-01-21

## 2025-01-21 NOTE — TELEPHONE ENCOUNTER
Spoke with patient, blood sugar and A1c too high. Will add glimepiride 4 mg ac B and S. Patient states other medications cost prohibitive (? On medicare and Medicaid)

## 2025-02-24 DIAGNOSIS — Z00.00 ENCOUNTER FOR MEDICARE ANNUAL WELLNESS EXAM: ICD-10-CM

## 2025-02-26 DIAGNOSIS — E11.9 TYPE 2 DIABETES MELLITUS WITHOUT COMPLICATION: ICD-10-CM

## 2025-03-13 ENCOUNTER — OFFICE VISIT (OUTPATIENT)
Dept: INTERNAL MEDICINE | Facility: CLINIC | Age: 70
End: 2025-03-13
Payer: MEDICARE

## 2025-03-13 VITALS
HEIGHT: 70 IN | OXYGEN SATURATION: 98 % | BODY MASS INDEX: 27.11 KG/M2 | SYSTOLIC BLOOD PRESSURE: 135 MMHG | DIASTOLIC BLOOD PRESSURE: 72 MMHG | WEIGHT: 189.38 LBS | HEART RATE: 79 BPM

## 2025-03-13 DIAGNOSIS — M79.604 PAIN OF RIGHT LOWER EXTREMITY: Primary | ICD-10-CM

## 2025-03-13 DIAGNOSIS — G57.01 NEUROPATHY OF RIGHT SCIATIC NERVE: ICD-10-CM

## 2025-03-13 PROCEDURE — 3078F DIAST BP <80 MM HG: CPT | Mod: HCNC,CPTII,S$GLB, | Performed by: PHYSICIAN ASSISTANT

## 2025-03-13 PROCEDURE — 1159F MED LIST DOCD IN RCRD: CPT | Mod: HCNC,CPTII,S$GLB, | Performed by: PHYSICIAN ASSISTANT

## 2025-03-13 PROCEDURE — 3008F BODY MASS INDEX DOCD: CPT | Mod: HCNC,CPTII,S$GLB, | Performed by: PHYSICIAN ASSISTANT

## 2025-03-13 PROCEDURE — 3046F HEMOGLOBIN A1C LEVEL >9.0%: CPT | Mod: HCNC,CPTII,S$GLB, | Performed by: PHYSICIAN ASSISTANT

## 2025-03-13 PROCEDURE — 1125F AMNT PAIN NOTED PAIN PRSNT: CPT | Mod: HCNC,CPTII,S$GLB, | Performed by: PHYSICIAN ASSISTANT

## 2025-03-13 PROCEDURE — 3075F SYST BP GE 130 - 139MM HG: CPT | Mod: HCNC,CPTII,S$GLB, | Performed by: PHYSICIAN ASSISTANT

## 2025-03-13 PROCEDURE — 1160F RVW MEDS BY RX/DR IN RCRD: CPT | Mod: HCNC,CPTII,S$GLB, | Performed by: PHYSICIAN ASSISTANT

## 2025-03-13 PROCEDURE — 99999 PR PBB SHADOW E&M-EST. PATIENT-LVL IV: CPT | Mod: PBBFAC,HCNC,, | Performed by: PHYSICIAN ASSISTANT

## 2025-03-13 PROCEDURE — 3072F LOW RISK FOR RETINOPATHY: CPT | Mod: HCNC,CPTII,S$GLB, | Performed by: PHYSICIAN ASSISTANT

## 2025-03-13 PROCEDURE — G2211 COMPLEX E/M VISIT ADD ON: HCPCS | Mod: HCNC,S$GLB,, | Performed by: PHYSICIAN ASSISTANT

## 2025-03-13 PROCEDURE — 1101F PT FALLS ASSESS-DOCD LE1/YR: CPT | Mod: HCNC,CPTII,S$GLB, | Performed by: PHYSICIAN ASSISTANT

## 2025-03-13 PROCEDURE — 99213 OFFICE O/P EST LOW 20 MIN: CPT | Mod: HCNC,S$GLB,, | Performed by: PHYSICIAN ASSISTANT

## 2025-03-13 PROCEDURE — 3288F FALL RISK ASSESSMENT DOCD: CPT | Mod: HCNC,CPTII,S$GLB, | Performed by: PHYSICIAN ASSISTANT

## 2025-03-13 RX ORDER — METHOCARBAMOL 500 MG/1
500 TABLET, FILM COATED ORAL NIGHTLY PRN
Qty: 10 TABLET | Refills: 0 | Status: SHIPPED | OUTPATIENT
Start: 2025-03-13 | End: 2025-03-23

## 2025-03-13 NOTE — Clinical Note
Good Afternoon Dr. Doyle, Patient is having acute sciatic nerve pain. He said if it's not better by your appt next month then he would like a referral to either Spine Clinic or Pain Management. Just wanted to let you know for your follow up.

## 2025-03-13 NOTE — PROGRESS NOTES
Subjective:       Patient ID: Tramaine Resendiz is a 69 y.o. male.      CHIEF COMPLAINT:  - Tramaine presents with right leg pain that has been ongoing for three weeks.    HPI:  - Tramaine reports right leg pain for approximately 3 weeks, extending from the leg down to the toes, consistent with sciatic nerve pain. He denies any specific injury or fall that precipitated the pain. The pain is constant, requiring the use of a walking stick for mobility. It began suddenly in his leg and has persisted since its onset.  - He has not taken any OTC pain medications such as Tylenol or Ibuprofen for this condition. Instead, he drinks 3 cups of Chelle tea daily, which he believes helps alleviate the pain. He applied a cream to his leg one night, which provided some relief. He has stopped coffee as part of his self-management strategy.  - He reports tingling sensations in addition to the pain. He mentions a history of back pain and sciatic nerve issues, noting that he had discussed back pain with Dr. Doyle in October, but that pain had resolved before the current episode began. He was evaluated by Dr. Holguin for orthopedics in September 2024 for right hip and right knee issues.  - He reports being previously active, mentioning a hunting trip to [place] last year where he walked up and down mountains without pain. After returning from the trip, he developed an unspecified hunting-related issue.  - He denies any recent falls, direct injuries to the leg, or back pain (except for the sciatic nerve pain). He denies any weakness in the affected leg.    MEDICATIONS:  - Tylenol, as needed for pain  - Chelle tea, 3 cups daily  - Discontinued coffee    MEDICAL HISTORY:  - Sciatic nerve pain  - Heart condition  - Elevated blood sugar    TEST RESULTS:  - Blood sugar: Recent, elevated         Review of Systems   Constitutional:  Negative for chills, fatigue, fever and unexpected weight change.   HENT:  Negative for congestion, dental problem, ear  "pain, hearing loss, rhinorrhea and trouble swallowing.    Eyes:  Negative for pain and visual disturbance.   Respiratory:  Negative for cough and shortness of breath.    Cardiovascular:  Negative for chest pain, palpitations and leg swelling.   Gastrointestinal:  Negative for abdominal distention, abdominal pain, blood in stool, constipation, diarrhea, nausea and vomiting.   Genitourinary:  Negative for difficulty urinating.   Musculoskeletal:  Negative for arthralgias (right leg pain) and myalgias.   Skin:  Negative for rash.   Neurological:  Negative for dizziness, weakness, numbness and headaches.   Hematological:  Negative for adenopathy. Does not bruise/bleed easily.   Psychiatric/Behavioral:  Negative for agitation, dysphoric mood and sleep disturbance.        Objective:      Physical Exam  Vitals reviewed.   Constitutional:       General: He is not in acute distress.     Appearance: Normal appearance. He is well-developed and normal weight. He is not ill-appearing or toxic-appearing.   HENT:      Head: Normocephalic and atraumatic.   Eyes:      General: Lids are normal. No scleral icterus.     Extraocular Movements: Extraocular movements intact.      Conjunctiva/sclera: Conjunctivae normal.      Pupils: Pupils are equal, round, and reactive to light.   Cardiovascular:      Rate and Rhythm: Normal rate.      Heart sounds: No murmur heard.     No friction rub. No gallop.   Pulmonary:      Effort: Pulmonary effort is normal. No respiratory distress.      Breath sounds: No stridor. No decreased breath sounds, wheezing, rhonchi or rales.   Musculoskeletal:         General: No swelling, tenderness, deformity or signs of injury. Normal range of motion.      Comments: Sciatic nerve pain from right hip that goes to right foot with standing, spinal flexion, and movement. Patient describes pain as "shooting pain". No pain to lower back on exam. Uses cane to walk.    Neurological:      General: No focal deficit present. "      Mental Status: He is alert and oriented to person, place, and time. Mental status is at baseline.      Cranial Nerves: No cranial nerve deficit.      Sensory: Sensory deficit (patient describes tingling down right leg) present.      Motor: No weakness.      Gait: Gait normal.   Psychiatric:         Mood and Affect: Mood and affect normal.         Behavior: Behavior normal.         Thought Content: Thought content normal.         Judgment: Judgment normal.         Assessment:       1. Pain of right lower extremity    2. Neuropathy of right sciatic nerve        Plan:   1. Pain of right lower extremity  -     methocarbamoL (ROBAXIN) 500 MG Tab; Take 1 tablet (500 mg total) by mouth nightly as needed (back/hip/leg pain).  Dispense: 10 tablet; Refill: 0    2. Neuropathy of right sciatic nerve        Assessment & Plan    IMPRESSION:  - Assessed patient's leg pain, likely sciatic nerve pain, ongoing for 3 weeks without prior injury  - Considered conservative management with medication and lifestyle modifications before pursuing further interventions  - Avoided prescribing steroids due to patient's cardiac history  - Ruled out significant weakness or concerning neurological deficits on basic exam      SCIATICA (RIGHT SIDE):   Explained that sciatic nerve pain often requires ongoing management rather than a one-time cure.   Instructed the patient to apply heating pad for 20 minutes at a time.   Prescribed muscle relaxer to be taken 15-30 minutes before bedtime.   Recommend Tylenol 2 tablets every 6-8 hours as needed for pain.   Noted that the patient reports leg pain for 3 weeks, describing it as sciatic nerve pain radiating down the leg to the feet.   Observed that the patient uses a walking stick due to constant pain.   Performed physical exam, noting pain in the right leg and lower back area.   Considered referral to spine clinic for further evaluation and possible nerve compression assessment.   Noted that the  patient uses a walking stick due to leg pain.   Noted patient's history of previous back pain that has resolved.    HEART DISEASE:   Advised continued avoidance of ibuprofen and naproxen due to heart condition.   Noted that the patient's blood pressure is good.    ABNORMAL GLUCOSE:   Noted that the patient's blood sugar runs slightly elevated.   Plan to discuss blood sugar issues with Dr. Doyle.    HISTORY OF MUSCULOSKELETAL ISSUES:   Noted patient's history of orthopedic consultations with Dr. Cline, specifically for right hip and right knee issues.    DEPENDENCE ON WALKING AID:   Observed that the patient uses a walking stick due to constant pain.   Noted that the patient uses a walking stick due to leg pain.    FOLLOW-UP:   Scheduled follow-up with Dr. Doyle in 1 month.   Will message Dr. Doyle about the visit and back issues.               This note was generated with the assistance of ambient listening technology. Verbal consent was obtained by the patient and accompanying visitor(s) for the recording of patient appointment to facilitate this note. I attest to having reviewed and edited the generated note for accuracy, though some syntax or spelling errors may persist. Please contact the author of this note for any clarification.        Visit today included increased complexity associated with the care of the episodic problem type 2 diabetes, which was addressed while instituting co-management of the longitudinal care of the patient due to the serious and/or complex managed problem(s) .    I have evaluated and discussed management associated with medical care services that serve as the continuing focal point for all needed health care services and/or with medical care services that are part of ongoing care related to my patient's single, serious condition or a complex condition(s).    I am providing ongoing care and I am the primary care provider for this patient, and they are being managed, monitored,  and/or observed for their chronic conditions over time.     I have addressed their ongoing health maintenance requirements and needs for all health care services and reviewed co-management plans provided by specialty providers when available.    Health Maintenance Due   Topic Date Due    TETANUS VACCINE  Never done    Colorectal Cancer Screening  Never done    Shingles Vaccine (1 of 2) Never done    RSV Vaccine (Age 60+ and Pregnant patients) (1 - Risk 60-74 years 1-dose series) Never done    COVID-19 Vaccine (5 - 2024-25 season) 09/01/2024    Foot Exam  01/25/2025    Lipid Panel  02/12/2025    Diabetes Urine Screening  03/21/2025    Hemoglobin A1c  04/17/2025

## 2025-04-24 ENCOUNTER — OFFICE VISIT (OUTPATIENT)
Dept: INTERNAL MEDICINE | Facility: CLINIC | Age: 70
End: 2025-04-24
Payer: MEDICARE

## 2025-04-24 ENCOUNTER — LAB VISIT (OUTPATIENT)
Dept: LAB | Facility: HOSPITAL | Age: 70
End: 2025-04-24
Attending: FAMILY MEDICINE
Payer: MEDICARE

## 2025-04-24 VITALS
SYSTOLIC BLOOD PRESSURE: 138 MMHG | HEIGHT: 70 IN | BODY MASS INDEX: 27.46 KG/M2 | DIASTOLIC BLOOD PRESSURE: 88 MMHG | OXYGEN SATURATION: 99 % | WEIGHT: 191.81 LBS | HEART RATE: 76 BPM

## 2025-04-24 DIAGNOSIS — E11.65 UNCONTROLLED TYPE 2 DIABETES MELLITUS WITH HYPERGLYCEMIA: ICD-10-CM

## 2025-04-24 DIAGNOSIS — Z79.4 TYPE 2 DIABETES MELLITUS WITH HYPERGLYCEMIA, WITH LONG-TERM CURRENT USE OF INSULIN: ICD-10-CM

## 2025-04-24 DIAGNOSIS — E78.5 HYPERLIPIDEMIA ASSOCIATED WITH TYPE 2 DIABETES MELLITUS: ICD-10-CM

## 2025-04-24 DIAGNOSIS — Z79.4 TYPE 2 DIABETES MELLITUS WITH HYPERGLYCEMIA, WITH LONG-TERM CURRENT USE OF INSULIN: Primary | ICD-10-CM

## 2025-04-24 DIAGNOSIS — E11.65 TYPE 2 DIABETES MELLITUS WITH HYPERGLYCEMIA, WITH LONG-TERM CURRENT USE OF INSULIN: Primary | ICD-10-CM

## 2025-04-24 DIAGNOSIS — I25.10 CORONARY ARTERY DISEASE INVOLVING NATIVE CORONARY ARTERY OF NATIVE HEART WITHOUT ANGINA PECTORIS: ICD-10-CM

## 2025-04-24 DIAGNOSIS — M25.551 PAIN OF RIGHT HIP: ICD-10-CM

## 2025-04-24 DIAGNOSIS — E11.69 HYPERLIPIDEMIA ASSOCIATED WITH TYPE 2 DIABETES MELLITUS: ICD-10-CM

## 2025-04-24 DIAGNOSIS — E11.65 TYPE 2 DIABETES MELLITUS WITH HYPERGLYCEMIA, WITH LONG-TERM CURRENT USE OF INSULIN: ICD-10-CM

## 2025-04-24 DIAGNOSIS — I10 PRIMARY HYPERTENSION: ICD-10-CM

## 2025-04-24 LAB
ALBUMIN SERPL BCP-MCNC: 3.8 G/DL (ref 3.5–5.2)
ALBUMIN/CREAT UR: 995.4 UG/MG
ALP SERPL-CCNC: 94 UNIT/L (ref 40–150)
ALT SERPL W/O P-5'-P-CCNC: 23 UNIT/L (ref 10–44)
ANION GAP (OHS): 8 MMOL/L (ref 8–16)
AST SERPL-CCNC: 19 UNIT/L (ref 11–45)
BILIRUB SERPL-MCNC: 0.3 MG/DL (ref 0.1–1)
BUN SERPL-MCNC: 13 MG/DL (ref 8–23)
CALCIUM SERPL-MCNC: 9.8 MG/DL (ref 8.7–10.5)
CHLORIDE SERPL-SCNC: 107 MMOL/L (ref 95–110)
CHOLEST SERPL-MCNC: 132 MG/DL (ref 120–199)
CHOLEST/HDLC SERPL: 3.1 {RATIO} (ref 2–5)
CO2 SERPL-SCNC: 25 MMOL/L (ref 23–29)
CREAT SERPL-MCNC: 1 MG/DL (ref 0.5–1.4)
CREAT UR-MCNC: 109 MG/DL (ref 23–375)
EAG (OHS): 232 MG/DL (ref 68–131)
GFR SERPLBLD CREATININE-BSD FMLA CKD-EPI: >60 ML/MIN/1.73/M2
GLUCOSE SERPL-MCNC: 261 MG/DL (ref 70–110)
HBA1C MFR BLD: 9.7 % (ref 4–5.6)
HDLC SERPL-MCNC: 42 MG/DL (ref 40–75)
HDLC SERPL: 31.8 % (ref 20–50)
LDLC SERPL CALC-MCNC: 68.8 MG/DL (ref 63–159)
MICROALBUMIN UR-MCNC: 1085 UG/ML (ref ?–5000)
NONHDLC SERPL-MCNC: 90 MG/DL
POTASSIUM SERPL-SCNC: 3.8 MMOL/L (ref 3.5–5.1)
PROT SERPL-MCNC: 7.5 GM/DL (ref 6–8.4)
SODIUM SERPL-SCNC: 140 MMOL/L (ref 136–145)
TRIGL SERPL-MCNC: 106 MG/DL (ref 30–150)

## 2025-04-24 PROCEDURE — 3079F DIAST BP 80-89 MM HG: CPT | Mod: CPTII,S$GLB,, | Performed by: FAMILY MEDICINE

## 2025-04-24 PROCEDURE — 82570 ASSAY OF URINE CREATININE: CPT

## 2025-04-24 PROCEDURE — 99999 PR PBB SHADOW E&M-EST. PATIENT-LVL III: CPT | Mod: PBBFAC,,, | Performed by: FAMILY MEDICINE

## 2025-04-24 PROCEDURE — 84075 ASSAY ALKALINE PHOSPHATASE: CPT

## 2025-04-24 PROCEDURE — 3008F BODY MASS INDEX DOCD: CPT | Mod: CPTII,S$GLB,, | Performed by: FAMILY MEDICINE

## 2025-04-24 PROCEDURE — G2211 COMPLEX E/M VISIT ADD ON: HCPCS | Mod: S$GLB,,, | Performed by: FAMILY MEDICINE

## 2025-04-24 PROCEDURE — 3075F SYST BP GE 130 - 139MM HG: CPT | Mod: CPTII,S$GLB,, | Performed by: FAMILY MEDICINE

## 2025-04-24 PROCEDURE — 36415 COLL VENOUS BLD VENIPUNCTURE: CPT

## 2025-04-24 PROCEDURE — 1159F MED LIST DOCD IN RCRD: CPT | Mod: CPTII,S$GLB,, | Performed by: FAMILY MEDICINE

## 2025-04-24 PROCEDURE — 3046F HEMOGLOBIN A1C LEVEL >9.0%: CPT | Mod: CPTII,S$GLB,, | Performed by: FAMILY MEDICINE

## 2025-04-24 PROCEDURE — 3072F LOW RISK FOR RETINOPATHY: CPT | Mod: CPTII,S$GLB,, | Performed by: FAMILY MEDICINE

## 2025-04-24 PROCEDURE — 99214 OFFICE O/P EST MOD 30 MIN: CPT | Mod: S$GLB,,, | Performed by: FAMILY MEDICINE

## 2025-04-24 PROCEDURE — 83036 HEMOGLOBIN GLYCOSYLATED A1C: CPT

## 2025-04-24 PROCEDURE — 82465 ASSAY BLD/SERUM CHOLESTEROL: CPT

## 2025-04-24 PROCEDURE — 1125F AMNT PAIN NOTED PAIN PRSNT: CPT | Mod: CPTII,S$GLB,, | Performed by: FAMILY MEDICINE

## 2025-04-24 RX ORDER — INSULIN GLARGINE 100 [IU]/ML
INJECTION, SOLUTION SUBCUTANEOUS
Qty: 15 ML | Refills: 1 | Status: SHIPPED | OUTPATIENT
Start: 2025-04-24

## 2025-04-24 RX ORDER — NAPROXEN SODIUM 220 MG/1
81 TABLET, FILM COATED ORAL DAILY
Qty: 30 TABLET | Refills: 11 | Status: SHIPPED | OUTPATIENT
Start: 2025-04-24 | End: 2026-04-24

## 2025-04-24 NOTE — PROGRESS NOTES
Tramaine Resendiz  04/24/2025  [id number]    Jeane Doyle MD  Patient Care Team:  Jeane Doyle MD as PCP - General (Family Medicine)  Sultana Ibarra NP as Nurse Practitioner (Internal Medicine)  Jesus Varma MD as Consulting Physician (Interventional Cardiology)  Alesha Louise MD as Consulting Physician (Ophthalmology)  Rosa Dominguez as ED Navigator  India Escobedo LPN as Licensed Practical Nurse          Visit Type:a scheduled routine follow-up visit    Chief Complaint:  Chief Complaint   Patient presents with    Follow-up     6 month; would like to discuss vaccines       History of Present Illness:    History of Present Illness    CHIEF COMPLAINT:  Mr. Resendiz presents today for follow up.    DIABETES:  He reports not taking insulin during a week-long trip to [place] due to storage concerns. He is currently taking 22 units of insulin and denies episodes of hypoglycemia. A1C was 9.5.    MUSCULOSKELETAL:  He reports lateral hip pain. He declined physical therapy, stating sufficient activity with walking.    CARDIOVASCULAR:  He has a history of cardiac stent placement. His last follow-up with Dr. Leblanc was a couple years ago. He is not taking Brilinta/Ticagrelor despite having refills available.    CURRENT MEDICATIONS:  He reports all medications are current and up to date, including blood pressure medications.        69 year old  Follow up Diabetes, CAD  MEDICAL HISTORY:  He has a history of diabetes, hypertension, hyperlipidemia, coronary artery disease, elevated PSA, and proteinuria. His diabetes control has been poor, with a recent hemoglobin A1c of 10. His last LDL was 99 in February. He has been seen by Dr. Baum for elevated PSA. His last eGFR was greater than 60, indicating normal kidney function.    He has not seen Cardiology in over a year.  He has PCI and RCA after NSTEMI  He is on pravachol 80  Should be on ASA and Brilnta but not refilled      MEDICATIONS:  Lantus was  recently increased to 22 units by diabetes management. He takes a statin, aspirin, and Brinenta for coronary artery disease, which is managed by cardiology. He is not taking Jardiance or Ozempic due to cost concerns. He reports having tried glyburide in the past without successful blood sugar control. He denies taking any other medications for diabetes management at this time.     Lab Results   Component Value Date    HGBA1C 9.5 (H) 01/17/2025       ORTHO  He has had visits with PA in office for leg pain and foot pain  Had been referred to Ortho.   He saw ortho for hip pain. He was given Tylenol and PT.  Wasn't candidate for injection secondary to sugars.       The following were reviewed at this visit: active problem list, medication list, allergies, family history, social history, and health maintenance.  History:  Past Medical History:   Diagnosis Date    Heavy drinker     cut back around 2018/2019    Hypertension     Type 2 diabetes mellitus without complications 2012    BS didn't check 11/28/2022     Past Surgical History:   Procedure Laterality Date    CLOSURE DEVICE  2/13/2024    Procedure: Placement of Closure Device;  Surgeon: Matthew Orellana MD;  Location: [facility] CATH LAB;  Service: Cardiology;;    HERNIA REPAIR      IVUS, CORONARY  2/13/2024    Procedure: IVUS, Coronary;  Surgeon: Matthew Orellana MD;  Location: [facility] CATH LAB;  Service: Cardiology;;    LEFT HEART CATHETERIZATION Left 2/13/2024    Procedure: Left heart cath;  Surgeon: Matthew Orellana MD;  Location: [facility] CATH LAB;  Service: Cardiology;  Laterality: Left;    PERCUTANEOUS CORONARY INTERVENTION (PCI) FOR CHRONIC TOTAL OCCLUSION OF CORONARY ARTERY  2/13/2024    Procedure: PCI, FOR CHRONIC TOTAL CORONARY ARTERY OCCLUSION;  Surgeon: Matthew Orellana MD;  Location: [facility] CATH LAB;  Service: Cardiology;;    PERCUTANEOUS CORONARY INTERVENTION, ARTERY N/A 2/13/2024    Procedure: Percutaneous coronary intervention;  Surgeon: Matthew Orellana MD;   Location: [facility] CATH LAB;  Service: Cardiology;  Laterality: N/A;    PLACEMENT, IABP  2/13/2024    Procedure: Placement, IABP;  Surgeon: Matthew Orellana MD;  Location: [facility] CATH LAB;  Service: Cardiology;;    STENT, DRUG ELUTING, MULTI VESSEL, CORONARY  2/13/2024    Procedure: Stent, Drug Eluting, Multi Vessel, Coronary;  Surgeon: Matthew Orellana MD;  Location: [facility] CATH LAB;  Service: Cardiology;;    THROMBECTOMY, CORONARY  2/13/2024    Procedure: Thrombectomy, Coronary;  Surgeon: Matthew Orellana MD;  Location: [facility] CATH LAB;  Service: Cardiology;;     Problem List[1]    Medications:  Medications Ordered Prior to Encounter[2]    Medications have been reviewed and reconciled with patient at this visit.    Exam:  Wt Readings from Last 3 Encounters:   04/24/25 87 kg (191 lb 12.8 oz)   03/13/25 85.9 kg (189 lb 6 oz)   01/17/25 87.9 kg (193 lb 12.6 oz)     Temp Readings from Last 3 Encounters:   12/16/24 96.8 °F (36 °C) (Tympanic)   10/24/24 97.9 °F (36.6 °C) (Tympanic)   09/19/24 98 °F (36.7 °C) (Oral)     BP Readings from Last 3 Encounters:   04/24/25 138/88   03/13/25 135/72   01/17/25 (!) 159/84     Pulse Readings from Last 3 Encounters:   04/24/25 76   03/13/25 79   01/17/25 78     Body mass index is 27.52 kg/m².      Review of Systems   Constitutional: Negative.  Negative for chills and fever.   HENT: Negative.  Negative for congestion, sinus pain and sore throat.    Eyes:  Negative for blurred vision and double vision.   Respiratory:  Negative for cough, sputum production, shortness of breath and wheezing.    Cardiovascular:  Negative for chest pain, palpitations and leg swelling.   Gastrointestinal:  Negative for abdominal pain, constipation, diarrhea, heartburn, nausea and vomiting.   Genitourinary: Negative.    Musculoskeletal:  Positive for joint pain.   Skin: Negative.  Negative for rash.   Neurological: Negative.    Endo/Heme/Allergies: Negative.  Negative for polydipsia. Does not bruise/bleed easily.    Psychiatric/Behavioral:  Negative for depression and substance abuse.      Physical Exam  Nursing note reviewed.   Pulmonary:      Effort: Pulmonary effort is normal. No respiratory distress.   Neurological:      Mental Status: He is alert and oriented to person, place, and time.   Psychiatric:         Mood and Affect: Mood normal.         Behavior: Behavior normal.         Thought Content: Thought content normal.         Judgment: Judgment normal.     No data recorded  Protective Sensation (w/ 10 gram monofilament):  Right: Intact  Left: Intact    Visual Inspection:  Normal -  Bilateral    Pedal Pulses:   Right: Present  Left: Present    Posterior Tibialis Pulses:   Right:Present  Left: Present   Physical Exam             Laboratory Reviewed ({Yes)    Lab Results   Component Value Date    WBC 6.95 09/14/2024    HGB 12.7 (L) 09/14/2024    HCT 40.4 09/14/2024     09/14/2024    CHOL 154 02/12/2024    TRIG 59 02/12/2024    HDL 43 02/12/2024    ALT 22 09/14/2024    AST 17 09/14/2024     01/17/2025    K 4.2 01/17/2025     01/17/2025    CREATININE 1.4 01/17/2025    BUN 15 01/17/2025    CO2 24 01/17/2025    TSH 1.328 10/24/2024    PSA 5.5 (H) 09/26/2022    INR 1.0 02/12/2024    HGBA1C 9.5 (H) 01/17/2025       Assessment & Plan    E11.9 Type 2 diabetes mellitus without complications  I10 Essential (primary) hypertension  M25.552 Pain in left hip  Z91.120 Patient's intentional underdosing of medication regimen due to financial hardship  Z79.4 Long term (current) use of insulin  Z95.5 Presence of coronary angioplasty implant and graft    A1C of 9.5 at last check.  Hip injection for pain relief contingent on improved glucose control.  Reviewed recent cardiac care, noting stent placement under Dr. Leblanc's care.  Hip pain primarily on the outside, no surgical intervention needed at this time.    TYPE 2 DIABETES MELLITUS:  - Monitored the patient's last A1C, which was 9.5, with no reported low blood sugar  episodes.  - Ordered A1C test to assess current glucose control.  - Assessed the patient's efforts to manage diabetes, noting the need for improvement.  - Emphasized the importance of better glucose control for overall health improvement.  - Continued insulin therapy, currently at 22 units.  - Instructed the patient to focus on improving glucose control.  - Increased insulin dosage to 22 units.  - Reinforced proper insulin administration technique.  - Noted that the patient did not take insulin during a week-long trip due to lack of proper storage.  - Educated the patient on the importance of maintaining insulin regimen and proper storage during travel.    ESSENTIAL HYPERTENSION:  - Continued current antihypertensive medication regimen.    LEFT HIP PAIN:  - Monitored patient's reported hip pain, more pronounced on the outside.  - Suggested physical therapy, which the patient declined.  - Planned to administer hip injection once blood sugars are better controlled.  - Prescribed Tylenol for hip pain management.  - Emphasized the importance of improved glucose control to enable hip injection treatment.  - Clarified that insulin injections in the side are not causing hip pain.    CORONARY ANGIOPLASTY FOLLOW-UP:  - Assessed that the patient has not seen  since the stent procedure.  - Noted that the patient is not taking Brilinta (ticagrelor) as prescribed by         Tdap and Hussein Redd was seen today for follow-up.    Diagnoses and all orders for this visit:    Type 2 diabetes mellitus with hyperglycemia, with long-term current use of insulin  -     Hemoglobin A1C; Future  -     Lipid Panel; Future  -     Comprehensive Metabolic Panel; Future  -     Microalbumin/Creatinine Ratio, Urine; Future    Coronary artery disease involving native coronary artery of native heart without angina pectoris    Hyperlipidemia associated with type 2 diabetes mellitus    Primary hypertension    Uncontrolled  type 2 diabetes mellitus with hyperglycemia  -     insulin glargine U-100, Lantus, (LANTUS SOLOSTAR U-100 INSULIN) 100 unit/mL (3 mL) InPn pen; Inject up to 50 units per day    Pain of right hip    Other orders  -     aspirin 81 MG Chew; Take 1 tablet (81 mg total) by mouth once daily.      Records for colon  He said he had one.          Care Plan/Goals: Reviewed     Follow up: Follow up in about 3 months (around 7/24/2025).    After visit summary was printed and given to patient upon discharge today.  Patient goals and care plan are included in After Visit Summary.    This note was generated with the assistance of ambient listening technology. Verbal consent was obtained by the patient and accompanying visitor(s) for the recording of patient appointment to facilitate this note. I attest to having reviewed and edited the generated note for accuracy, though some syntax or spelling errors may persist. Please contact the author of this note for any clarification.            [1]   Patient Active Problem List  Diagnosis    Erectile dysfunction    Type 2 diabetes mellitus    Hyperlipidemia associated with type 2 diabetes mellitus    Hypertension    Coronary artery disease involving native coronary artery of native heart without angina pectoris    Aortic atherosclerosis    Neuropathy of right sciatic nerve   [2]   Current Outpatient Medications on File Prior to Visit   Medication Sig Dispense Refill    blood sugar diagnostic Strp To check BG 1-2 times daily, to use with insurance preferred meter 100 strip 2    blood-glucose meter kit To check BG 1-2 times daily, to use with insurance preferred meter 1 each 0    glimepiride (AMARYL) 4 MG tablet Take 1 tablet (4 mg total) by mouth 2 (two) times daily with meals. 180 tablet 1    lancets Misc To check BG 1-2 times daily, to use with insurance preferred meter 100 each 2    metoprolol tartrate (LOPRESSOR) 25 MG tablet Take 1 tablet (25 mg total) by mouth 2 (two) times daily. 60  "tablet 11    nitroGLYCERIN (NITROSTAT) 0.4 MG SL tablet Place 1 tablet (0.4 mg total) under the tongue every 5 (five) minutes as needed for Chest pain (angina). 100 tablet 2    nitroGLYCERIN (NITROSTAT) 0.4 MG SL tablet Place 1 tablet (0.4 mg total) under the tongue every 5 (five) minutes as needed for Chest pain. 100 tablet 0    ondansetron (ZOFRAN-ODT) 4 MG TbDL Take 1 tablet (4 mg total) by mouth every 6 (six) hours as needed (Nausea). 20 tablet 0    pen needle, diabetic 32 gauge x 5/32" Ndle 1 each by Misc.(Non-Drug; Combo Route) route once daily. 100 each 1    pravastatin (PRAVACHOL) 80 MG tablet Take 1 tablet (80 mg total) by mouth once daily. 90 tablet 3    [DISCONTINUED] aspirin 81 MG Chew Take 1 tablet (81 mg total) by mouth once daily. 30 tablet 11    [DISCONTINUED] insulin glargine U-100, Lantus, (LANTUS SOLOSTAR U-100 INSULIN) 100 unit/mL (3 mL) InPn pen Inject up to 50 units per day 15 mL 1    ticagrelor (BRILINTA) 90 mg tablet Take 1 tablet (90 mg total) by mouth 2 (two) times daily. 60 tablet 11     No current facility-administered medications on file prior to visit.     "

## 2025-04-25 ENCOUNTER — RESULTS FOLLOW-UP (OUTPATIENT)
Dept: INTERNAL MEDICINE | Facility: CLINIC | Age: 70
End: 2025-04-25

## 2025-04-25 ENCOUNTER — TELEPHONE (OUTPATIENT)
Dept: DIABETES | Facility: CLINIC | Age: 70
End: 2025-04-25
Payer: MEDICARE

## 2025-04-25 NOTE — TELEPHONE ENCOUNTER
Spoke with patient 's wife confirmed appointment and that he will be at his diabetes appointment 05/02/25 at 8 am

## 2025-04-25 NOTE — TELEPHONE ENCOUNTER
----- Message from Jessica Montoya NP sent at 4/25/2025 10:01 AM CDT -----  Thanks, he is scheduled to see Ernestina 5/2   ----- Message -----  From: Jeane Doyle MD  Sent: 4/25/2025   8:43 AM CDT  To: Ernestina Arceo NP; Vivek Ching Staff    Call patient  Labs same  Sugars to high.   261  HgA1c is 9.7  Increase insulin to 25 units a day.    ----- Message -----  From: Lab, Background User  Sent: 4/24/2025   6:17 PM CDT  To: Jeane Doyle MD

## 2025-05-02 ENCOUNTER — OFFICE VISIT (OUTPATIENT)
Dept: DIABETES | Facility: CLINIC | Age: 70
End: 2025-05-02
Payer: MEDICARE

## 2025-05-02 VITALS
BODY MASS INDEX: 27.67 KG/M2 | DIASTOLIC BLOOD PRESSURE: 91 MMHG | SYSTOLIC BLOOD PRESSURE: 154 MMHG | WEIGHT: 193.31 LBS | HEART RATE: 80 BPM | HEIGHT: 70 IN

## 2025-05-02 DIAGNOSIS — I25.10 CORONARY ARTERY DISEASE INVOLVING NATIVE CORONARY ARTERY OF NATIVE HEART WITHOUT ANGINA PECTORIS: ICD-10-CM

## 2025-05-02 DIAGNOSIS — Z79.4 TYPE 2 DIABETES MELLITUS WITH CHRONIC KIDNEY DISEASE, WITH LONG-TERM CURRENT USE OF INSULIN, UNSPECIFIED CKD STAGE: Primary | ICD-10-CM

## 2025-05-02 DIAGNOSIS — I10 PRIMARY HYPERTENSION: ICD-10-CM

## 2025-05-02 DIAGNOSIS — E11.22 TYPE 2 DIABETES MELLITUS WITH CHRONIC KIDNEY DISEASE, WITH LONG-TERM CURRENT USE OF INSULIN, UNSPECIFIED CKD STAGE: Primary | ICD-10-CM

## 2025-05-02 LAB — GLUCOSE SERPL-MCNC: 178 MG/DL (ref 70–110)

## 2025-05-02 PROCEDURE — 99999 PR PBB SHADOW E&M-EST. PATIENT-LVL IV: CPT | Mod: PBBFAC,HCNC,, | Performed by: NURSE PRACTITIONER

## 2025-05-02 RX ORDER — EMPAGLIFLOZIN 25 MG/1
25 TABLET, FILM COATED ORAL DAILY
Qty: 30 TABLET | Refills: 11 | Status: SHIPPED | OUTPATIENT
Start: 2025-05-02

## 2025-05-02 NOTE — PROGRESS NOTES
"    Patient ID: Tramaine Resendiz is a 69 y.o. male.  Patient's current PCP is Jeane Doyle MD.     Chief Complaint: Diabetes Mellitus (EST PT)    HPI:  Tramaine Resendiz is a 69 y.o. Black or  male presenting for a follow up for diabetes.     Patient has been diagnosed with type 2 diabetes for > 10 years.  Recent diabetes related hospitalizations: None since last visit  Pertinent to decision making is/are the following comorbidities:  Htn  Hln  Cad  HX of MI    Complications related to diabetes: nephropathy and cardiovascular disease  Previous diabetes education: no, declines  Occupation:Retired [employer]/, . New granddaughter in 12/24    Current diet:  Eating 3 meals per day. Improving discretion. Weight up 5#  Current weight: 193# A1c 9.7  Weight at LOV:  193 on 1/17/25 A1c 9.5  Weight at FOV:  186 on 8/2/24 A1c 10.6  Activity level: Reports being more active daily in yard/around the house - Denies CP and CASTRO.     Changes made at last visit:  Spoke with patient, blood sugar and A1c too high. Will add glimepiride 4 mg ac B and S. Patient states other medications cost prohibitive (? On medicare and Medicaid)     Current issues: Reports taking an " [place] tea" twice a day that is helping with his sciatica. States wanting to use onion and garlic to treat health issues.  States off Lantus x 1 week when traveled to [place]. Plans to ride horses in [place] tomorrow. Clarified: Patient does have Medicaid and does not pay for prescriptions.    CURRENT DM MEDICATIONS:   Diabetes Medications              glimepiride (AMARYL) 4 MG tablet Take 1 tablet (4 mg total) by mouth 2 (two) times daily with meals.    insulin glargine U-100, Lantus, (LANTUS SOLOSTAR U-100 INSULIN) 100 unit/mL (3 mL) InPn pen Inject up to 50 units per day  Taking 22 units in am     Past failed treatment(s) include:   Glyburide  Ozempic - too expensive  Metformin - severe diarrhea  Jardiance/Farxiga - " "cost    His blood sugar in the clinic today was:   Lab Results   Component Value Date    POCGLU 178 (A) 05/02/2025       Meter/cgm: -  Blood glucose testing is not performed.   Patient is testing 0 times per day.  Any episodes of hypoglycemia? Denies   Glucose trends: unknown      Diabetes Management Status    Statin: Taking  ACE/ARB: Not taking    Screening or Prevention Patient's value Goal Complete/Controlled?   HgA1C Testing and Control   Lab Results   Component Value Date    HGBA1C 9.7 (H) 04/24/2025      Annually/Less than 8% No   Lipid profile : 04/24/2025 Annually Yes   LDL control Lab Results   Component Value Date    LDLCALC 68.8 04/24/2025    Annually/Less than 100 mg/dl  Yes   Nephropathy screening Lab Results   Component Value Date    LABMICR 1,085.0 04/24/2025     Lab Results   Component Value Date    PROTEINUA 1+ (A) 09/14/2024     No results found for: "UTPCR"   Annually Yes   Blood pressure BP Readings from Last 1 Encounters:   05/02/25 (!) 154/91    Less than 140/90 Yes   Dilated retinal exam : 07/25/2024 Annually Yes   Foot exam   : 04/24/2025  Annually Yes       Preferred lab site: [facility]  Preferred visit site: [facility]    Wt Readings from Last 3 Encounters:   05/02/25 0756 87.7 kg (193 lb 5.5 oz)   04/24/25 0847 87 kg (191 lb 12.8 oz)   03/13/25 1513 85.9 kg (189 lb 6 oz)     Labs reviewed and are noted below.    Lab Results   Component Value Date    HGBA1C 9.7 (H) 04/24/2025    HGBA1C 9.5 (H) 01/17/2025    HGBA1C 9.3 (H) 10/24/2024     Lab Results   Component Value Date    WBC 6.95 09/14/2024    HGB 12.7 (L) 09/14/2024    HCT 40.4 09/14/2024     09/14/2024    CHOL 132 04/24/2025    TRIG 106 04/24/2025    HDL 42 04/24/2025    LDLCALC 68.8 04/24/2025    ALT 23 04/24/2025    AST 19 04/24/2025     04/24/2025    K 3.8 04/24/2025     04/24/2025    ANIONGAP 8 04/24/2025    CREATININE 1.0 04/24/2025    ESTGFRAFRICA >60 01/08/2019    EGFRNONAA >60 01/08/2019    BUN 13 04/24/2025    CO2 25 " "04/24/2025    TSH 1.328 10/24/2024    PSA 5.5 (H) 09/26/2022    INR 1.0 02/12/2024     (H) 04/24/2025     Lab Results   Component Value Date    TSH 1.328 10/24/2024    RCGQDLZR39 822 10/04/2023    CALCIUM 9.8 04/24/2025     No results found for: "CPEPTIDE"  No results found for: "GLUTAMICACID"  Glucose   Date Value Ref Range Status   04/24/2025 261 (H) 70 - 110 mg/dL Final   01/17/2025 235 (H) 70 - 110 mg/dL Final     Anion Gap   Date Value Ref Range Status   04/24/2025 8 8 - 16 mmol/L Final     eGFR if    Date Value Ref Range Status   01/08/2019 >60 >60 mL/min/1.73 m^2 Final     eGFR if non    Date Value Ref Range Status   01/08/2019 >60 >60 mL/min/1.73 m^2 Final     Comment:     Calculation used to obtain the estimated glomerular filtration  rate (eGFR) is the CKD-EPI equation.              Review of patient's allergies indicates:   Allergen Reactions    Tomato      Social History     Socioeconomic History    Marital status:    Tobacco Use    Smoking status: Former     Types: Cigarettes    Smokeless tobacco: Never   Substance and Sexual Activity    Alcohol use: Not Currently    Drug use: Never    Sexual activity: Not Currently     Social Drivers of Health     Financial Resource Strain: High Risk (7/31/2024)    Overall Financial Resource Strain (CARDIA)     Difficulty of Paying Living Expenses: Hard   Food Insecurity: Food Insecurity Present (7/31/2024)    Hunger Vital Sign     Worried About Running Out of Food in the Last Year: Often true     Ran Out of Food in the Last Year: Never true   Transportation Needs: No Transportation Needs (7/31/2024)    PRAPARE - Transportation     Lack of Transportation (Medical): No     Lack of Transportation (Non-Medical): No   Physical Activity: Insufficiently Active (7/31/2024)    Exercise Vital Sign     Days of Exercise per Week: 3 days     Minutes of Exercise per Session: 30 min   Stress: No Stress Concern Present (7/31/2024)    " [facility] [employer] [place] [facility] [employer] - Occupational Stress Questionnaire     Feeling of Stress : Not at all   Housing Stability: Unknown (7/31/2024)    Housing Stability Vital Sign     Unable to Pay for Housing in the Last Year: No     Homeless in the Last Year: No     Past Medical History:   Diagnosis Date    Heavy drinker     cut back around 2018/2019    Hypertension     Type 2 diabetes mellitus without complications 2012    BS didn't check 11/28/2022       Review of Systems   Constitutional:  Negative for malaise/fatigue and weight loss.   Eyes:  Negative for blurred vision and double vision.   Respiratory:  Negative for shortness of breath.    Cardiovascular:  Negative for chest pain, palpitations, claudication and leg swelling.   Gastrointestinal: Negative.    Genitourinary:  Negative for frequency.   Musculoskeletal:  Negative for back pain, falls and myalgias.   Neurological: Negative.    Psychiatric/Behavioral: Negative.           Physical Exam  Vitals reviewed.   Constitutional:       General: He is not in acute distress.     Appearance: Normal appearance. He is obese.   Eyes:      Conjunctiva/sclera: Conjunctivae normal.      Pupils: Pupils are equal, round, and reactive to light.   Neck:      Thyroid: No thyroid mass, thyromegaly or thyroid tenderness.      Vascular: No carotid bruit.   Cardiovascular:      Rate and Rhythm: Normal rate and regular rhythm.      Pulses: Normal pulses.      Heart sounds: Normal heart sounds. No murmur heard.  Pulmonary:      Effort: Pulmonary effort is normal.      Breath sounds: Normal breath sounds.   Abdominal:      General: Bowel sounds are normal. There is no distension.      Palpations: Abdomen is soft.   Musculoskeletal:      Cervical back: Normal range of motion and neck supple.      Right lower leg: No edema.      Left lower leg: No edema.   Skin:     General: Skin is warm and dry.      Comments: Sites without hypertrophy and/or infection   Neurological:       General: No focal deficit present.      Mental Status: He is alert and oriented to person, place, and time.   Psychiatric:         Mood and Affect: Mood normal.         Behavior: Behavior normal.             Assessment & Plan    Type 2 diabetes mellitus with chronic kidney disease, with long-term current use of insulin, unspecified CKD stage  -     POCT Glucose, Hand-Held Device  -     JARDIANCE 25 mg tablet; Take 1 tablet (25 mg total) by mouth once daily.  Dispense: 30 tablet; Refill: 11    Primary hypertension    Coronary artery disease involving native coronary artery of native heart without angina pectoris    Plan of care: In setting of poor health literacy  Start Jardiance 25 mg daily. Patient agrees to try it for 3 months  Continue Lantus 22 units every day  Continue glimepiride 4 mg at breakfast and at supper  Call if any symptoms of low blood sugar - info sheet provided  Encouraged patient to take all of his medications in addition to alternative treatments. Rec he bring all alternative treatments in for review    - Reviewed with patient:  The basic pathophysiology of Type 2 diabetes  Mechanism of action and action time of medications  Use of home glucose monitor/cgm  Basic diet/carbohydrate counting/avoiding simple sugars/plate method  Proper hydration   Risk of complications and preventive measures  When to call for assistance      - Follow up: 3 months    Visit today included increased complexity associated with the care of the episodic problem hyperglycemia addressed and managing the longitudinal care of the patient due to the serious and/or complex managed problem(s) T2DM.

## 2025-05-02 NOTE — PATIENT INSTRUCTIONS
Start Jardiance 25 mg once a day   Continue Lantus 22 units every day  Continue glimepiride 4 mg at breakfast and at supper    Call if any symptoms of low blood sugar - info sheet provided

## 2025-05-22 ENCOUNTER — NURSE TRIAGE (OUTPATIENT)
Dept: ADMINISTRATIVE | Facility: CLINIC | Age: 70
End: 2025-05-22
Payer: MEDICARE

## 2025-05-23 NOTE — TELEPHONE ENCOUNTER
Pt's daughter reports pt's bilateral legs and feet were very cold earlier, so he put on some massaging boots and now he states he feels fine. Denies any pain/swelling/numbness/tingling. Daughter just wanted to make sure pt was safe to wait to follow up with his PCP tomorrow or if he needed to be seen in the ED. Pt advised call his PCP within 24 hours per protocol, but encouraged to call back with any worsening symptoms or questions. They verbalized understanding.    Reason for Disposition   Nursing judgment or information in reference    Protocols used: No Guideline [facility]

## 2025-06-04 ENCOUNTER — TELEPHONE (OUTPATIENT)
Dept: DIABETES | Facility: CLINIC | Age: 70
End: 2025-06-04
Payer: MEDICARE

## 2025-06-19 ENCOUNTER — OFFICE VISIT (OUTPATIENT)
Dept: INTERNAL MEDICINE | Facility: CLINIC | Age: 70
End: 2025-06-19
Payer: MEDICARE

## 2025-06-19 VITALS
HEART RATE: 77 BPM | BODY MASS INDEX: 27.42 KG/M2 | WEIGHT: 191.56 LBS | TEMPERATURE: 98 F | DIASTOLIC BLOOD PRESSURE: 88 MMHG | SYSTOLIC BLOOD PRESSURE: 130 MMHG | OXYGEN SATURATION: 99 % | HEIGHT: 70 IN

## 2025-06-19 DIAGNOSIS — M25.551 PAIN OF RIGHT HIP: ICD-10-CM

## 2025-06-19 DIAGNOSIS — G57.01 NEUROPATHY OF RIGHT SCIATIC NERVE: ICD-10-CM

## 2025-06-19 DIAGNOSIS — Z79.4 TYPE 2 DIABETES MELLITUS WITH HYPERGLYCEMIA, WITH LONG-TERM CURRENT USE OF INSULIN: Primary | ICD-10-CM

## 2025-06-19 DIAGNOSIS — E11.65 TYPE 2 DIABETES MELLITUS WITH HYPERGLYCEMIA, WITH LONG-TERM CURRENT USE OF INSULIN: Primary | ICD-10-CM

## 2025-06-19 DIAGNOSIS — I25.10 CORONARY ARTERY DISEASE INVOLVING NATIVE CORONARY ARTERY OF NATIVE HEART WITHOUT ANGINA PECTORIS: ICD-10-CM

## 2025-06-19 DIAGNOSIS — M16.10 ARTHRITIS OF HIP, UNSPECIFIED LATERALITY: ICD-10-CM

## 2025-06-19 DIAGNOSIS — M17.0 BILATERAL PRIMARY OSTEOARTHRITIS OF KNEE: ICD-10-CM

## 2025-06-19 PROCEDURE — 3046F HEMOGLOBIN A1C LEVEL >9.0%: CPT | Mod: CPTII,HCNC,S$GLB, | Performed by: FAMILY MEDICINE

## 2025-06-19 PROCEDURE — 1126F AMNT PAIN NOTED NONE PRSNT: CPT | Mod: CPTII,HCNC,S$GLB, | Performed by: FAMILY MEDICINE

## 2025-06-19 PROCEDURE — 99214 OFFICE O/P EST MOD 30 MIN: CPT | Mod: HCNC,S$GLB,, | Performed by: FAMILY MEDICINE

## 2025-06-19 PROCEDURE — 3008F BODY MASS INDEX DOCD: CPT | Mod: CPTII,HCNC,S$GLB, | Performed by: FAMILY MEDICINE

## 2025-06-19 PROCEDURE — 3062F POS MACROALBUMINURIA REV: CPT | Mod: CPTII,HCNC,S$GLB, | Performed by: FAMILY MEDICINE

## 2025-06-19 PROCEDURE — 3288F FALL RISK ASSESSMENT DOCD: CPT | Mod: CPTII,HCNC,S$GLB, | Performed by: FAMILY MEDICINE

## 2025-06-19 PROCEDURE — 1101F PT FALLS ASSESS-DOCD LE1/YR: CPT | Mod: CPTII,HCNC,S$GLB, | Performed by: FAMILY MEDICINE

## 2025-06-19 PROCEDURE — 99999 PR PBB SHADOW E&M-EST. PATIENT-LVL V: CPT | Mod: PBBFAC,HCNC,, | Performed by: FAMILY MEDICINE

## 2025-06-19 PROCEDURE — 1159F MED LIST DOCD IN RCRD: CPT | Mod: CPTII,HCNC,S$GLB, | Performed by: FAMILY MEDICINE

## 2025-06-19 PROCEDURE — 3079F DIAST BP 80-89 MM HG: CPT | Mod: CPTII,HCNC,S$GLB, | Performed by: FAMILY MEDICINE

## 2025-06-19 PROCEDURE — 3066F NEPHROPATHY DOC TX: CPT | Mod: CPTII,HCNC,S$GLB, | Performed by: FAMILY MEDICINE

## 2025-06-19 PROCEDURE — G2211 COMPLEX E/M VISIT ADD ON: HCPCS | Mod: HCNC,S$GLB,, | Performed by: FAMILY MEDICINE

## 2025-06-19 PROCEDURE — 3075F SYST BP GE 130 - 139MM HG: CPT | Mod: CPTII,HCNC,S$GLB, | Performed by: FAMILY MEDICINE

## 2025-06-19 PROCEDURE — 3072F LOW RISK FOR RETINOPATHY: CPT | Mod: CPTII,HCNC,S$GLB, | Performed by: FAMILY MEDICINE

## 2025-06-19 NOTE — PROGRESS NOTES
Tramaine Resendiz  06/19/2025  [id number]    Jeane Doyle MD  Patient Care Team:  Jeane Doyle MD as PCP - General (Family Medicine)  Sultana Ibarra NP as Nurse Practitioner (Internal Medicine)  Jesus Varma MD as Consulting Physician (Interventional Cardiology)  Alesha Louise MD as Consulting Physician (Ophthalmology)  Rosa Dominguez as ED Navigator  India Escobedo LPN as Licensed Practical Nurse          Visit Type:an urgent visit for an existing problem     Chief Complaint:  Chief Complaint   Patient presents with    Annual Exam       History of Present Illness:    History of Present Illness    CHIEF COMPLAINT:  Mr. Resendiz presents today for follow up- needs test for [employer] points for money    DIABETES:  He reports blood sugar reading of 178, improved from previous levels. He is currently taking Lantus injections with approximately four doses remaining. He temporarily discontinued medications during a recent trip to [place] due to travel concerns but has since resumed compliance with medication regimen. His last A1c was slightly elevated with follow-up testing due end of July.    MUSCULOSKELETAL:  He reports hip pain associated with arthritis. He received treatment from his former  which provided significant symptomatic relief.        69 year old male.   Had some hip pain, then went to a marital arts instructor, and he said had adjustment    Has seen ORtho for Hip and back- AThritis  Needed better control of sugars for injection  Referred to PT  Brace for knee.  Told to take tylenol    Xray done in 24  TECHNIQUE:  AP view of the pelvis and frogleg lateral views of both hips were performed.     COMPARISON:  09/26/2022     FINDINGS:  Mild sclerosis and remodeling of the bilateral hips.  On the right there is increased sclerosis and irregularity of the superior acetabulum mildly worsened since prior.  Lumbosacral spondylosis.  Osseous demineralization.      Impression:  Mild to moderate degenerative changes of the bilateral hips.        Electronically signed by:Francisco Corona  Date:                                            09/05/2024  DID not got back to Ortho    DM- sent to DM management  Lab Results   Component Value Date    HGBA1C 9.7 (H) 04/24/2025   Lantus 22  Glimepiride 4 mg BID  Added Jardance- stopped due to cost.   HgA1c in July            The following were reviewed at this visit: active problem list, medication list, allergies, family history, social history, and health maintenance.  History:  Past Medical History:   Diagnosis Date    Heavy drinker     cut back around 2018/2019    Hypertension     Type 2 diabetes mellitus without complications 2012    BS didn't check 11/28/2022     Past Surgical History:   Procedure Laterality Date    CLOSURE DEVICE  2/13/2024    Procedure: Placement of Closure Device;  Surgeon: Matthew rOellana MD;  Location: [facility] CATH LAB;  Service: Cardiology;;    HERNIA REPAIR      IVUS, CORONARY  2/13/2024    Procedure: IVUS, Coronary;  Surgeon: Matthew Orellana MD;  Location: [facility] CATH LAB;  Service: Cardiology;;    LEFT HEART CATHETERIZATION Left 2/13/2024    Procedure: Left heart cath;  Surgeon: Matthew Orellana MD;  Location: [facility] CATH LAB;  Service: Cardiology;  Laterality: Left;    PERCUTANEOUS CORONARY INTERVENTION (PCI) FOR CHRONIC TOTAL OCCLUSION OF CORONARY ARTERY  2/13/2024    Procedure: PCI, FOR CHRONIC TOTAL CORONARY ARTERY OCCLUSION;  Surgeon: Matthew Orellana MD;  Location: [facility] CATH LAB;  Service: Cardiology;;    PERCUTANEOUS CORONARY INTERVENTION, ARTERY N/A 2/13/2024    Procedure: Percutaneous coronary intervention;  Surgeon: Matthew Orellana MD;  Location: [facility] CATH LAB;  Service: Cardiology;  Laterality: N/A;    PLACEMENT, IABP  2/13/2024    Procedure: Placement, IABP;  Surgeon: Matthew Orellana MD;  Location: [facility] CATH LAB;  Service: Cardiology;;    STENT, DRUG ELUTING, MULTI VESSEL, CORONARY  2/13/2024     Procedure: Stent, Drug Eluting, Multi Vessel, Coronary;  Surgeon: Matthew Orellana MD;  Location: [facility] CATH LAB;  Service: Cardiology;;    THROMBECTOMY, CORONARY  2/13/2024    Procedure: Thrombectomy, Coronary;  Surgeon: Matthew Orellana MD;  Location: [facility] CATH LAB;  Service: Cardiology;;     Problem List[1]    Medications:  Medications Ordered Prior to Encounter[2]    Medications have been reviewed and reconciled with patient at this visit.    Exam:  Wt Readings from Last 3 Encounters:   06/19/25 86.9 kg (191 lb 9.3 oz)   05/02/25 87.7 kg (193 lb 5.5 oz)   04/24/25 87 kg (191 lb 12.8 oz)     Temp Readings from Last 3 Encounters:   06/19/25 97.6 °F (36.4 °C) (Tympanic)   12/16/24 96.8 °F (36 °C) (Tympanic)   10/24/24 97.9 °F (36.6 °C) (Tympanic)     BP Readings from Last 3 Encounters:   06/19/25 130/88   05/02/25 (!) 154/91   04/24/25 138/88     Pulse Readings from Last 3 Encounters:   06/19/25 77   05/02/25 80   04/24/25 76     Body mass index is 27.49 kg/m².      Review of Systems   Musculoskeletal:  Positive for back pain and joint pain.     Physical Exam  Nursing note reviewed.   Cardiovascular:      Rate and Rhythm: Normal rate and regular rhythm.      Heart sounds: Murmur heard.   Pulmonary:      Effort: Pulmonary effort is normal. No respiratory distress.   Neurological:      Mental Status: He is alert and oriented to person, place, and time.   Psychiatric:         Mood and Affect: Mood normal.         Behavior: Behavior normal.         Thought Content: Thought content normal.         Judgment: Judgment normal.       Physical Exam             Laboratory Reviewed ({Yes)    Lab Results   Component Value Date    WBC 6.95 09/14/2024    HGB 12.7 (L) 09/14/2024    HCT 40.4 09/14/2024     09/14/2024    CHOL 132 04/24/2025    TRIG 106 04/24/2025    HDL 42 04/24/2025    ALT 23 04/24/2025    AST 19 04/24/2025     04/24/2025    K 3.8 04/24/2025     04/24/2025    CREATININE 1.0 04/24/2025    BUN 13  04/24/2025    CO2 25 04/24/2025    TSH 1.328 10/24/2024    PSA 5.5 (H) 09/26/2022    INR 1.0 02/12/2024    HGBA1C 9.7 (H) 04/24/2025       Assessment & Plan    E11.65 Type 2 diabetes mellitus with hyperglycemia  M16.9 Osteoarthritis of hip, unspecified  Z79.4 Long term (current) use of insulin    Reviewed recent lab results, noting A1C was elevated in previous test.  Assessed diabetes management, including medication adherence and glucose control.  Evaluated hip pain, acknowledging arthritis as underlying cause.  Considered recent travel and its impact on medication regimen.  DM eye next month  HgA1c in July  Seeing [facility] DM   Seems to be better with Insulin    TYPE 2 DIABETES MELLITUS WITH HYPERGLYCEMIA:  - Monitored the patient's blood sugar level, which was 178 last month on Accu-Chek, showing improvement from previous levels but still above the target of 150.  - Evaluated glycemic control and discussed management strategies.  - Will continue Lantus insulin injections for long-term diabetes management, noting 4 doses remaining.  - Ordered labs to further assess diabetic control.    OSTEOARTHRITIS OF HIP:  - Monitored the patient's hip pain, noting significant improvement following martial arts therapy with Mr. Brizuela.  - Acknowledged the presence of hip arthritis and discussed the benefits of continuing therapy with Mr. Brizuela.    FOLLOW-UP:  - Mr. Resendiz to complete and send in Cologuard test, ensuring to write date of collection on the sample.     Has appt with Cards in Sept  Known CAD  On ASA STatin  BB        Tramaine was seen today for annual exam.    Diagnoses and all orders for this visit:    Type 2 diabetes mellitus with hyperglycemia, with long-term current use of insulin  -     Hemoglobin A1C; Future    Arthritis of hip, unspecified laterality    Neuropathy of right sciatic nerve    Pain of right hip    Coronary artery disease involving native coronary artery of native heart without angina pectoris    Bilateral  primary osteoarthritis of knee          Schedule EYE exam  Resent Cologluan  A1c in July    Refer back to Ortho  Known moderate athritis        Care Plan/Goals: Reviewed     Follow up: No follow-ups on file.    After visit summary was printed and given to patient upon discharge today.  Patient goals and care plan are included in After Visit Summary.           [1]   Patient Active Problem List  Diagnosis    Erectile dysfunction    Type 2 diabetes mellitus    Hyperlipidemia associated with type 2 diabetes mellitus    Hypertension    Coronary artery disease involving native coronary artery of native heart without angina pectoris    Aortic atherosclerosis    Neuropathy of right sciatic nerve   [2]   Current Outpatient Medications on File Prior to Visit   Medication Sig Dispense Refill    aspirin 81 MG Chew Take 1 tablet (81 mg total) by mouth once daily. 30 tablet 11    blood sugar diagnostic Strp To check BG 1-2 times daily, to use with insurance preferred meter 100 strip 2    glimepiride (AMARYL) 4 MG tablet Take 1 tablet (4 mg total) by mouth 2 (two) times daily with meals. 180 tablet 1    insulin glargine U-100, Lantus, (LANTUS SOLOSTAR U-100 INSULIN) 100 unit/mL (3 mL) InPn pen Inject up to 50 units per day 15 mL 1    JARDIANCE 25 mg tablet Take 1 tablet (25 mg total) by mouth once daily. 30 tablet 11    lancets Misc To check BG 1-2 times daily, to use with insurance preferred meter 100 each 2    metoprolol tartrate (LOPRESSOR) 25 MG tablet Take 1 tablet (25 mg total) by mouth 2 (two) times daily. 60 tablet 11    nitroGLYCERIN (NITROSTAT) 0.4 MG SL tablet Place 1 tablet (0.4 mg total) under the tongue every 5 (five) minutes as needed for Chest pain (angina). 100 tablet 2    nitroGLYCERIN (NITROSTAT) 0.4 MG SL tablet Place 1 tablet (0.4 mg total) under the tongue every 5 (five) minutes as needed for Chest pain. 100 tablet 0    ondansetron (ZOFRAN-ODT) 4 MG TbDL Take 1 tablet (4 mg total) by mouth every 6 (six) hours  "as needed (Nausea). 20 tablet 0    pen needle, diabetic 32 gauge x 5/32" Ndle 1 each by Misc.(Non-Drug; Combo Route) route once daily. 100 each 1    pravastatin (PRAVACHOL) 80 MG tablet Take 1 tablet (80 mg total) by mouth once daily. 90 tablet 3    blood-glucose meter kit To check BG 1-2 times daily, to use with insurance preferred meter 1 each 0    ticagrelor (BRILINTA) 90 mg tablet Take 1 tablet (90 mg total) by mouth 2 (two) times daily. 60 tablet 11     No current facility-administered medications on file prior to visit.     "

## 2025-06-20 ENCOUNTER — PATIENT OUTREACH (OUTPATIENT)
Dept: ADMINISTRATIVE | Facility: HOSPITAL | Age: 70
End: 2025-06-20
Payer: MEDICARE

## 2025-06-20 DIAGNOSIS — Z12.11 ENCOUNTER FOR COLORECTAL CANCER SCREENING: Primary | ICD-10-CM

## 2025-06-20 DIAGNOSIS — Z12.12 ENCOUNTER FOR COLORECTAL CANCER SCREENING: Primary | ICD-10-CM

## 2025-06-20 NOTE — PROGRESS NOTES
[facility] Score: 1     Colon Cancer Screening    Tetanus Vaccine  Shingles/Zoster Vaccine  RSV Vaccine            Pt seen 6/19/25 for annual exam.  Discussed colon ca screen. He did have a cologuard year ago, that was not completed. He requested a new order, saying he would complete it.  Cologuard ordered.

## 2025-07-08 NOTE — TELEPHONE ENCOUNTER
No care due was identified.  [facility] Embedded Care Due Messages. Reference number: [id number].   7/08/2025 9:22:40 AM CDT

## 2025-07-10 RX ORDER — TICAGRELOR 90 MG/1
90 TABLET, FILM COATED ORAL 2 TIMES DAILY
Qty: 60 TABLET | Refills: 3 | Status: SHIPPED | OUTPATIENT
Start: 2025-07-10 | End: 2026-07-10

## 2025-07-15 RX ORDER — GLIMEPIRIDE 4 MG/1
4 TABLET ORAL 2 TIMES DAILY WITH MEALS
Qty: 180 TABLET | Refills: 0 | Status: SHIPPED | OUTPATIENT
Start: 2025-07-15 | End: 2026-07-15

## 2025-07-15 NOTE — TELEPHONE ENCOUNTER
Courtesy refill of Glimepiride given for coverage of JONO [place].     Elder Ventura PA-C  Diabetes Management       Elder Ventura PA-C  Diabetes Management

## 2025-08-05 ENCOUNTER — PATIENT OUTREACH (OUTPATIENT)
Dept: ADMINISTRATIVE | Facility: HOSPITAL | Age: 70
End: 2025-08-05
Payer: MEDICARE

## 2025-08-12 ENCOUNTER — PATIENT OUTREACH (OUTPATIENT)
Dept: ADMINISTRATIVE | Facility: HOSPITAL | Age: 70
End: 2025-08-12
Payer: MEDICARE

## 2025-08-15 ENCOUNTER — OFFICE VISIT (OUTPATIENT)
Dept: DIABETES | Facility: CLINIC | Age: 70
End: 2025-08-15
Payer: MEDICARE

## 2025-08-15 ENCOUNTER — LAB VISIT (OUTPATIENT)
Dept: LAB | Facility: HOSPITAL | Age: 70
End: 2025-08-15
Attending: NURSE PRACTITIONER
Payer: MEDICARE

## 2025-08-15 VITALS — WEIGHT: 194.44 LBS | BODY MASS INDEX: 27.9 KG/M2

## 2025-08-15 DIAGNOSIS — E11.22 TYPE 2 DIABETES MELLITUS WITH CHRONIC KIDNEY DISEASE, WITH LONG-TERM CURRENT USE OF INSULIN, UNSPECIFIED CKD STAGE: Primary | ICD-10-CM

## 2025-08-15 DIAGNOSIS — E78.5 HYPERLIPIDEMIA ASSOCIATED WITH TYPE 2 DIABETES MELLITUS: ICD-10-CM

## 2025-08-15 DIAGNOSIS — I25.10 CORONARY ARTERY DISEASE INVOLVING NATIVE CORONARY ARTERY OF NATIVE HEART WITHOUT ANGINA PECTORIS: ICD-10-CM

## 2025-08-15 DIAGNOSIS — I10 PRIMARY HYPERTENSION: ICD-10-CM

## 2025-08-15 DIAGNOSIS — E11.22 TYPE 2 DIABETES MELLITUS WITH CHRONIC KIDNEY DISEASE, WITH LONG-TERM CURRENT USE OF INSULIN, UNSPECIFIED CKD STAGE: ICD-10-CM

## 2025-08-15 DIAGNOSIS — Z79.4 TYPE 2 DIABETES MELLITUS WITH CHRONIC KIDNEY DISEASE, WITH LONG-TERM CURRENT USE OF INSULIN, UNSPECIFIED CKD STAGE: ICD-10-CM

## 2025-08-15 DIAGNOSIS — Z79.4 TYPE 2 DIABETES MELLITUS WITH CHRONIC KIDNEY DISEASE, WITH LONG-TERM CURRENT USE OF INSULIN, UNSPECIFIED CKD STAGE: Primary | ICD-10-CM

## 2025-08-15 DIAGNOSIS — E11.69 HYPERLIPIDEMIA ASSOCIATED WITH TYPE 2 DIABETES MELLITUS: ICD-10-CM

## 2025-08-15 LAB
EAG (OHS): 209 MG/DL (ref 68–131)
GLUCOSE SERPL-MCNC: 160 MG/DL (ref 70–110)
HBA1C MFR BLD: 8.9 % (ref 4–5.6)

## 2025-08-15 PROCEDURE — 83036 HEMOGLOBIN GLYCOSYLATED A1C: CPT

## 2025-08-15 PROCEDURE — 36415 COLL VENOUS BLD VENIPUNCTURE: CPT

## 2025-08-15 PROCEDURE — 99999 PR PBB SHADOW E&M-EST. PATIENT-LVL III: CPT | Mod: PBBFAC,,, | Performed by: NURSE PRACTITIONER

## 2025-08-21 ENCOUNTER — TELEPHONE (OUTPATIENT)
Dept: DIABETES | Facility: CLINIC | Age: 70
End: 2025-08-21
Payer: MEDICARE

## (undated) DEVICE — CATH JL4 5FR

## (undated) DEVICE — CATH SAPPHIRE II PRO OTW 1X15

## (undated) DEVICE — CATH INFINITI MULTIPAK JR4 5FR

## (undated) DEVICE — WIRE GUIDE TEFLON 3CM .035 145

## (undated) DEVICE — CATH SUPERCROSS 45 DEG 130CM

## (undated) DEVICE — GUIDE LAUNCHER 7FR EBU 3.5

## (undated) DEVICE — MICROCATH TELEPORT 2FR 135CM

## (undated) DEVICE — CATH JR4 5FR

## (undated) DEVICE — GUIDE LAUNCHER 6FR EBU 3.75

## (undated) DEVICE — BLLN CATH SENSATION PLUS 8FR

## (undated) DEVICE — GUIDEWIRE EMERALD .035IN 260CM

## (undated) DEVICE — WIRE X-SUP CHOICE PT .014X182

## (undated) DEVICE — ANGIOTOUCH KIT

## (undated) DEVICE — SHEATH DESTINATION 6FR 45CM

## (undated) DEVICE — CATH NC EMERGE MR 4X15MM

## (undated) DEVICE — GUIDEWIRE FIELDER XT.014X300CM

## (undated) DEVICE — Device

## (undated) DEVICE — KIT INDIGO CATH RX ASP 140CM

## (undated) DEVICE — WIRE CHOICE PT X SUPP 014X300

## (undated) DEVICE — GUIDEWIRE FIELDER XT.014X190CM

## (undated) DEVICE — CATH INFINITI 4F 3DRC 100CM

## (undated) DEVICE — CATH HEARTRAIL III 6FR 100 IR1

## (undated) DEVICE — GUIDEWIRE ALL-STAR .014 300CM

## (undated) DEVICE — GUIDEWIRE WHOLEY HI TORQ 175CM

## (undated) DEVICE — OMNIPAQUE 300MG 150ML VIAL

## (undated) DEVICE — CATH EMERGE MR 20 X 2.50

## (undated) DEVICE — KIT MANIFOLD LOW PRESS TUBING

## (undated) DEVICE — KIT GLIDESHEATH SLEND 6FR 10CM

## (undated) DEVICE — CATH EMERGE MR 30 X 2.50

## (undated) DEVICE — BAND TR COMP DEVICE REG 24CM

## (undated) DEVICE — IMMOBILIZER KNEE 22IN

## (undated) DEVICE — CATH NC EMERGE MR 3.5X15MM

## (undated) DEVICE — GUIDE LAUNCHER 6FR EBU 3.5

## (undated) DEVICE — CATH PIG145 INFINITI 5X110CM

## (undated) DEVICE — CATH REVOLUTION IVUS 45MHZ

## (undated) DEVICE — SHEATH INTRODUCER 6FR 11CM

## (undated) DEVICE — SHEATH DESTINATION 7F X 45CM

## (undated) DEVICE — CATH SAPPH II PRO SC 2X15MM

## (undated) DEVICE — KIT SYR REUSABLE

## (undated) DEVICE — CATH SAPPHIRE II PRO SC 1X15MM